# Patient Record
Sex: FEMALE | Race: OTHER | HISPANIC OR LATINO | URBAN - METROPOLITAN AREA
[De-identification: names, ages, dates, MRNs, and addresses within clinical notes are randomized per-mention and may not be internally consistent; named-entity substitution may affect disease eponyms.]

---

## 2017-02-10 ENCOUNTER — EMERGENCY (EMERGENCY)
Facility: HOSPITAL | Age: 52
LOS: 1 days | Discharge: HOME | End: 2017-02-10
Attending: EMERGENCY MEDICINE | Admitting: EMERGENCY MEDICINE
Payer: COMMERCIAL

## 2017-02-10 VITALS
HEART RATE: 81 BPM | TEMPERATURE: 98 F | RESPIRATION RATE: 20 BRPM | SYSTOLIC BLOOD PRESSURE: 129 MMHG | OXYGEN SATURATION: 98 % | HEIGHT: 63 IN | WEIGHT: 137.35 LBS | DIASTOLIC BLOOD PRESSURE: 84 MMHG

## 2017-02-10 VITALS
DIASTOLIC BLOOD PRESSURE: 78 MMHG | RESPIRATION RATE: 18 BRPM | TEMPERATURE: 98 F | HEART RATE: 80 BPM | OXYGEN SATURATION: 98 % | SYSTOLIC BLOOD PRESSURE: 119 MMHG

## 2017-02-10 DIAGNOSIS — Z91.013 ALLERGY TO SEAFOOD: ICD-10-CM

## 2017-02-10 DIAGNOSIS — R51 HEADACHE: ICD-10-CM

## 2017-02-10 DIAGNOSIS — Z88.0 ALLERGY STATUS TO PENICILLIN: ICD-10-CM

## 2017-02-10 PROCEDURE — 80053 COMPREHEN METABOLIC PANEL: CPT

## 2017-02-10 PROCEDURE — A9585: CPT

## 2017-02-10 PROCEDURE — 99285 EMERGENCY DEPT VISIT HI MDM: CPT | Mod: 25

## 2017-02-10 PROCEDURE — 70553 MRI BRAIN STEM W/O & W/DYE: CPT

## 2017-02-10 PROCEDURE — 85025 COMPLETE CBC W/AUTO DIFF WBC: CPT

## 2017-02-10 PROCEDURE — 93010 ELECTROCARDIOGRAM REPORT: CPT

## 2017-02-10 PROCEDURE — 70553 MRI BRAIN STEM W/O & W/DYE: CPT | Mod: 26

## 2017-02-10 PROCEDURE — 93005 ELECTROCARDIOGRAM TRACING: CPT

## 2017-02-10 PROCEDURE — 99284 EMERGENCY DEPT VISIT MOD MDM: CPT | Mod: 25

## 2017-02-10 PROCEDURE — 96374 THER/PROPH/DIAG INJ IV PUSH: CPT

## 2017-02-10 RX ORDER — METOCLOPRAMIDE HCL 10 MG
10 TABLET ORAL ONCE
Qty: 0 | Refills: 0 | Status: COMPLETED | OUTPATIENT
Start: 2017-02-10 | End: 2017-02-10

## 2017-02-10 RX ORDER — TRAMADOL HYDROCHLORIDE 50 MG/1
1 TABLET ORAL
Qty: 20 | Refills: 0
Start: 2017-02-10

## 2017-02-10 RX ADMIN — Medication 104 MILLIGRAM(S): at 19:14

## 2017-02-10 NOTE — ED ADULT TRIAGE NOTE - CHIEF COMPLAINT QUOTE
Patient c/o headache , dizziness  and blurry vision for 3 weeks . Denies any nausea nor vomiting . History of lupus  and head adenoma .

## 2017-02-10 NOTE — ED ADULT NURSE NOTE - CHPI ED SYMPTOMS NEG
no vomiting/no numbness/no confusion/no fever/no weakness/no loss of consciousness/no change in level of consciousness

## 2017-02-10 NOTE — ED ADULT NURSE NOTE - OBJECTIVE STATEMENT
pt is a 50 y/o female c/o constant headache for the last 3 weeks along with blurred vision, nausea, and dizziness. pt states vision has become increasingly worse and more blurry even with glasses. PMH adenoma & lupus. pt taking 5mg prednisone daily. denies any fever/chills, v/d, neck tenderness, back pain, SOB, numbness/tingling, LOC, falls, injury. EKG done, VS stable. no acute distress, pt ambulatory, speaking in full sentences, resting comfortably in stretcher awaiting MRI, made aware of plan of care, will continue to monitor.

## 2017-02-10 NOTE — ED PROVIDER NOTE - MEDICAL DECISION MAKING DETAILS
W/U initiated including labs and MRI.  Reglan given for HA.  MRI demonstrates no compression of optic chiasm.  Pt improves with treatment here and is stable for discharge with instructions for close f/u to continue evaluation.  Pt has f/u with her neuro-ophthalmologist on the 24th and instructed to see her Rheumatologist prior to that appt. Discussed broad ddx and pt understands she must f/u to further investigate her sxs.     I have discussed the discharge plan with the patient. The patient agrees with the plan, as discussed.  The patient understands Emergency Department diagnosis is a preliminary diagnosis often based on limited information and that the patient must adhere to the follow-up plan as discussed.  The patient understands that if the symptoms worsen she may return to the Emergency Department at any time for further evaluation and treatment.

## 2017-02-10 NOTE — ED PROVIDER NOTE - OBJECTIVE STATEMENT
Pt is a 50yo F with a h/o SLE and adenoma of the pituitary gland (dx in 2013) and presents with 3 weeks of HA.  Pt also notes that her vision has been worsening - described as blurry - since December of 2016.  Pt has followed with her rheumatologist and believed the blurry vision to be 2/2 Plaquenil so was stopped and has since been on a prednisone taper - currently on 5mg.  She has seen her ophthalmologist Pt is a 52yo F with a h/o SLE and adenoma of the pituitary gland (dx in 2013) and presents with 3 weeks of HA.  Pt also notes that her vision has been worsening - described as blurry - since December of 2016.  Pt has followed with her rheumatologist and believed the blurry vision to be 2/2 Plaquenil so was stopped and has since been on a prednisone taper - currently on 5mg.  She has seen her ophthalmologist and reported they couldn't find any acute issues so recommended she follows with her neurophthalmologist, and she has an appt on 2/24.  HA started about 3 weeks ago and is constant but waxes and wanes in severity.  Currently moderate but tolerable.  Encompasses entire head and radiates down back of neck.  Dull ache in nature.  No associated fever, chills, neck stiffness, numbness, weakness, N/V, or other complaints.

## 2017-11-14 ENCOUNTER — EMERGENCY (EMERGENCY)
Facility: HOSPITAL | Age: 52
LOS: 1 days | Discharge: HOME | End: 2017-11-14
Attending: EMERGENCY MEDICINE | Admitting: EMERGENCY MEDICINE
Payer: COMMERCIAL

## 2017-11-14 VITALS
HEART RATE: 63 BPM | DIASTOLIC BLOOD PRESSURE: 76 MMHG | SYSTOLIC BLOOD PRESSURE: 113 MMHG | OXYGEN SATURATION: 100 % | RESPIRATION RATE: 16 BRPM | TEMPERATURE: 99 F

## 2017-11-14 VITALS
OXYGEN SATURATION: 100 % | RESPIRATION RATE: 16 BRPM | HEART RATE: 78 BPM | DIASTOLIC BLOOD PRESSURE: 78 MMHG | WEIGHT: 130.07 LBS | SYSTOLIC BLOOD PRESSURE: 144 MMHG | TEMPERATURE: 98 F

## 2017-11-14 DIAGNOSIS — R10.11 RIGHT UPPER QUADRANT PAIN: ICD-10-CM

## 2017-11-14 DIAGNOSIS — Z88.0 ALLERGY STATUS TO PENICILLIN: ICD-10-CM

## 2017-11-14 DIAGNOSIS — K21.9 GASTRO-ESOPHAGEAL REFLUX DISEASE WITHOUT ESOPHAGITIS: ICD-10-CM

## 2017-11-14 DIAGNOSIS — R21 RASH AND OTHER NONSPECIFIC SKIN ERUPTION: ICD-10-CM

## 2017-11-14 DIAGNOSIS — Z79.899 OTHER LONG TERM (CURRENT) DRUG THERAPY: ICD-10-CM

## 2017-11-14 DIAGNOSIS — Z91.013 ALLERGY TO SEAFOOD: ICD-10-CM

## 2017-11-14 LAB
ALBUMIN SERPL ELPH-MCNC: 4.3 G/DL — SIGNIFICANT CHANGE UP (ref 3.3–5)
ALP SERPL-CCNC: 68 U/L — SIGNIFICANT CHANGE UP (ref 40–120)
ALT FLD-CCNC: 11 U/L — SIGNIFICANT CHANGE UP (ref 10–45)
ANION GAP SERPL CALC-SCNC: 10 MMOL/L — SIGNIFICANT CHANGE UP (ref 5–17)
AST SERPL-CCNC: 16 U/L — SIGNIFICANT CHANGE UP (ref 10–40)
BASOPHILS NFR BLD AUTO: 0.3 % — SIGNIFICANT CHANGE UP (ref 0–2)
BILIRUB SERPL-MCNC: 0.5 MG/DL — SIGNIFICANT CHANGE UP (ref 0.2–1.2)
BUN SERPL-MCNC: 12 MG/DL — SIGNIFICANT CHANGE UP (ref 7–23)
CALCIUM SERPL-MCNC: 9.6 MG/DL — SIGNIFICANT CHANGE UP (ref 8.4–10.5)
CHLORIDE SERPL-SCNC: 101 MMOL/L — SIGNIFICANT CHANGE UP (ref 96–108)
CO2 SERPL-SCNC: 27 MMOL/L — SIGNIFICANT CHANGE UP (ref 22–31)
CREAT SERPL-MCNC: 0.6 MG/DL — SIGNIFICANT CHANGE UP (ref 0.5–1.3)
EOSINOPHIL NFR BLD AUTO: 0.9 % — SIGNIFICANT CHANGE UP (ref 0–6)
GLUCOSE SERPL-MCNC: 94 MG/DL — SIGNIFICANT CHANGE UP (ref 70–99)
HCT VFR BLD CALC: 32.4 % — LOW (ref 34.5–45)
HGB BLD-MCNC: 10.5 G/DL — LOW (ref 11.5–15.5)
LIDOCAIN IGE QN: 24 U/L — SIGNIFICANT CHANGE UP (ref 7–60)
LYMPHOCYTES # BLD AUTO: 29 % — SIGNIFICANT CHANGE UP (ref 13–44)
MCHC RBC-ENTMCNC: 29.4 PG — SIGNIFICANT CHANGE UP (ref 27–34)
MCHC RBC-ENTMCNC: 32.4 G/DL — SIGNIFICANT CHANGE UP (ref 32–36)
MCV RBC AUTO: 90.8 FL — SIGNIFICANT CHANGE UP (ref 80–100)
MONOCYTES NFR BLD AUTO: 11.8 % — SIGNIFICANT CHANGE UP (ref 2–14)
NEUTROPHILS NFR BLD AUTO: 58 % — SIGNIFICANT CHANGE UP (ref 43–77)
PLATELET # BLD AUTO: 251 K/UL — SIGNIFICANT CHANGE UP (ref 150–400)
POTASSIUM SERPL-MCNC: 4 MMOL/L — SIGNIFICANT CHANGE UP (ref 3.5–5.3)
POTASSIUM SERPL-SCNC: 4 MMOL/L — SIGNIFICANT CHANGE UP (ref 3.5–5.3)
PROT SERPL-MCNC: 7.7 G/DL — SIGNIFICANT CHANGE UP (ref 6–8.3)
RBC # BLD: 3.57 M/UL — LOW (ref 3.8–5.2)
RBC # FLD: 13.1 % — SIGNIFICANT CHANGE UP (ref 10.3–16.9)
SODIUM SERPL-SCNC: 138 MMOL/L — SIGNIFICANT CHANGE UP (ref 135–145)
TROPONIN T SERPL-MCNC: <0.01 NG/ML — SIGNIFICANT CHANGE UP (ref 0–0.01)
WBC # BLD: 6.8 K/UL — SIGNIFICANT CHANGE UP (ref 3.8–10.5)
WBC # FLD AUTO: 6.8 K/UL — SIGNIFICANT CHANGE UP (ref 3.8–10.5)

## 2017-11-14 PROCEDURE — 99284 EMERGENCY DEPT VISIT MOD MDM: CPT | Mod: 25

## 2017-11-14 PROCEDURE — 93010 ELECTROCARDIOGRAM REPORT: CPT

## 2017-11-14 PROCEDURE — 80053 COMPREHEN METABOLIC PANEL: CPT

## 2017-11-14 PROCEDURE — 76705 ECHO EXAM OF ABDOMEN: CPT

## 2017-11-14 PROCEDURE — 84484 ASSAY OF TROPONIN QUANT: CPT

## 2017-11-14 PROCEDURE — 96374 THER/PROPH/DIAG INJ IV PUSH: CPT

## 2017-11-14 PROCEDURE — 96375 TX/PRO/DX INJ NEW DRUG ADDON: CPT

## 2017-11-14 PROCEDURE — 93005 ELECTROCARDIOGRAM TRACING: CPT

## 2017-11-14 PROCEDURE — 85025 COMPLETE CBC W/AUTO DIFF WBC: CPT

## 2017-11-14 PROCEDURE — 83690 ASSAY OF LIPASE: CPT

## 2017-11-14 PROCEDURE — 99285 EMERGENCY DEPT VISIT HI MDM: CPT | Mod: 25

## 2017-11-14 PROCEDURE — 76705 ECHO EXAM OF ABDOMEN: CPT | Mod: 26

## 2017-11-14 RX ORDER — SODIUM CHLORIDE 9 MG/ML
1000 INJECTION INTRAMUSCULAR; INTRAVENOUS; SUBCUTANEOUS ONCE
Qty: 0 | Refills: 0 | Status: COMPLETED | OUTPATIENT
Start: 2017-11-14 | End: 2017-11-14

## 2017-11-14 RX ORDER — DIPHENHYDRAMINE HCL 50 MG
25 CAPSULE ORAL ONCE
Qty: 0 | Refills: 0 | Status: COMPLETED | OUTPATIENT
Start: 2017-11-14 | End: 2017-11-14

## 2017-11-14 RX ORDER — LIDOCAINE 4 G/100G
10 CREAM TOPICAL ONCE
Qty: 0 | Refills: 0 | Status: COMPLETED | OUTPATIENT
Start: 2017-11-14 | End: 2017-11-14

## 2017-11-14 RX ORDER — ONDANSETRON 8 MG/1
4 TABLET, FILM COATED ORAL ONCE
Qty: 0 | Refills: 0 | Status: COMPLETED | OUTPATIENT
Start: 2017-11-14 | End: 2017-11-14

## 2017-11-14 RX ORDER — SODIUM CHLORIDE 9 MG/ML
3 INJECTION INTRAMUSCULAR; INTRAVENOUS; SUBCUTANEOUS ONCE
Qty: 0 | Refills: 0 | Status: COMPLETED | OUTPATIENT
Start: 2017-11-14 | End: 2017-11-14

## 2017-11-14 RX ORDER — SODIUM CHLORIDE 9 MG/ML
1000 INJECTION INTRAMUSCULAR; INTRAVENOUS; SUBCUTANEOUS
Qty: 0 | Refills: 0 | Status: DISCONTINUED | OUTPATIENT
Start: 2017-11-14 | End: 2017-11-18

## 2017-11-14 RX ORDER — FAMOTIDINE 10 MG/ML
20 INJECTION INTRAVENOUS ONCE
Qty: 0 | Refills: 0 | Status: COMPLETED | OUTPATIENT
Start: 2017-11-14 | End: 2017-11-14

## 2017-11-14 RX ADMIN — SODIUM CHLORIDE 1000 MILLILITER(S): 9 INJECTION INTRAMUSCULAR; INTRAVENOUS; SUBCUTANEOUS at 10:00

## 2017-11-14 RX ADMIN — FAMOTIDINE 20 MILLIGRAM(S): 10 INJECTION INTRAVENOUS at 10:01

## 2017-11-14 RX ADMIN — Medication 101 MILLIGRAM(S): at 12:52

## 2017-11-14 RX ADMIN — ONDANSETRON 4 MILLIGRAM(S): 8 TABLET, FILM COATED ORAL at 10:01

## 2017-11-14 RX ADMIN — Medication 30 MILLILITER(S): at 10:01

## 2017-11-14 RX ADMIN — SODIUM CHLORIDE 3 MILLILITER(S): 9 INJECTION INTRAMUSCULAR; INTRAVENOUS; SUBCUTANEOUS at 09:55

## 2017-11-14 RX ADMIN — LIDOCAINE 10 MILLILITER(S): 4 CREAM TOPICAL at 10:01

## 2017-11-14 NOTE — ED PROVIDER NOTE - CARE PLAN
Principal Discharge DX:	Abdominal pain  Secondary Diagnosis:	GERD (gastroesophageal reflux disease)  Secondary Diagnosis:	Rash

## 2017-11-14 NOTE — ED ADULT TRIAGE NOTE - CHIEF COMPLAINT QUOTE
epigastric abdominal burning x 5 days.  Vomited at 5am today.  Last BM this morning.  Denies fever, chills, recent travels.

## 2017-11-14 NOTE — ED PROVIDER NOTE - MEDICAL DECISION MAKING DETAILS
53 yo female with hx of lupus (on prednisone daily), fibromyalgia, "problems with liver"- ?fatty liver/ ?elevated liver enzymes,  presents with nausea and nbnb emesis every time she eats and burning epigastric abdominal pain X 1 week. Was prescribed Zantac last week for these sxs by her PCP with no relief. Went to see her PCP yesterday and was referred to a GI doctor but does not have an apt till much later. labs/ studies noted. U/s with no acute ds. Pt feeling much better post GI cocktail. TO continue Zantac and to f/up outpt. SXs likely sec to GERD. 51 yo female with hx of lupus (on prednisone daily), fibromyalgia, "problems with liver"- ?fatty liver/ ?elevated liver enzymes,  presents with nausea and nbnb emesis every time she eats and burning epigastric abdominal pain X 1 week. Was prescribed Zantac last week for these sxs by her PCP with no relief. Went to see her PCP yesterday and was referred to a GI doctor but does not have an apt till much later. labs/ studies noted. U/s with no acute ds. Pt feeling much better post GI cocktail. To continue Zantac and to f/up outpt. SXs likely sec to GERD. Pt also to f/up outpt with for rash.

## 2017-11-14 NOTE — ED PROVIDER NOTE - OBJECTIVE STATEMENT
53 yo female with hx of lupus(on prednisone daily), fibromyalgia, "problems with liver"- ?fatty liver/ ?elevated liver enzymes,  presents with nausea and nbnb emesis every time she eats and burning epigastric abdominal pain X 1 week. Was prescribed Zantac last week for these sxs by her PCP with no relief. Went to see her PCP yesterday and was referred to a GI doctotr but states "he is booked till next year". Sxs have continued and pt presents to ED for evaluation. Did not take Zantac today. No prior abdominal surgeries. Pt also c/o itchy rash to torso and arms- had similar last yeara and was seen by dermatologist and had a biopsy that showed a "fungal infection". No resp sxs/ stridor/ throat tightness/ facial swelling. NO CP/SOB/ fevers/ chills/ cough. No change in BMs- last BM this morning- non-bloody and formed. 51 yo female with hx of lupus(on prednisone daily), fibromyalgia, "problems with liver"- ?fatty liver/ ?elevated liver enzymes,  presents with nausea and nbnb emesis every time she eats and burning epigastric abdominal pain X 1 week. Was prescribed Zantac last week for these sxs by her PCP with no relief. Went to see her PCP yesterday and was referred to a GI doctor but states "he is booked till next year". Sxs have continued and pt presents to ED for evaluation. Did not take Zantac today. No prior abdominal surgeries. Pt also c/o itchy rash to torso and arms- had similar last yeara and was seen by dermatologist and had a biopsy that showed a "fungal infection". No resp sxs/ stridor/ throat tightness/ facial swelling. NO CP/SOB/ fevers/ chills/ cough. No change in BMs- last BM this morning- non-bloody and formed. 51 yo female with hx of lupus (on prednisone daily), fibromyalgia, "problems with liver"- ?fatty liver/ ?elevated liver enzymes,  presents with nausea and nbnb emesis every time she eats and burning epigastric abdominal pain X 1 week. Was prescribed Zantac last week for these sxs by her PCP with no relief. Went to see her PCP yesterday and was referred to a GI doctor but states "he is booked till next year". Sxs have continued and pt presents to ED for evaluation. Did not take Zantac today. No prior abdominal surgeries. Pt also c/o itchy rash to torso and arms- had similar last yeara and was seen by dermatologist and had a biopsy that showed a "fungal infection". No resp sxs/ stridor/ throat tightness/ facial swelling. NO CP/SOB/ fevers/ chills/ cough. No change in BMs- last BM this morning- non-bloody and formed. 51 yo female with hx of lupus (on prednisone daily), fibromyalgia, "problems with liver"- ?fatty liver/ ?elevated liver enzymes,  presents with nausea and nbnb emesis every time she eats and burning epigastric abdominal pain X 1 week. Was prescribed Zantac last week for these sxs by her PCP with no relief. Went to see her PCP yesterday and was referred to a GI doctor but states "he is booked till next year". Sxs have continued and pt presents to ED for evaluation. Did not take Zantac today. No prior abdominal surgeries. Pt also c/o itchy rash to torso and arms- had similar last year and was seen by dermatologist and had a biopsy that showed a "fungal infection". No resp sxs/ stridor/ throat tightness/ facial swelling. NO CP/SOB/ fevers/ chills/ cough. No change in BMs- last BM this morning- non-bloody and formed.

## 2019-04-11 PROBLEM — M32.9 SYSTEMIC LUPUS ERYTHEMATOSUS, UNSPECIFIED: Chronic | Status: ACTIVE | Noted: 2017-02-10

## 2019-06-19 ENCOUNTER — APPOINTMENT (OUTPATIENT)
Dept: NEUROLOGY | Facility: CLINIC | Age: 54
End: 2019-06-19
Payer: MEDICAID

## 2019-06-19 PROCEDURE — 99204 OFFICE O/P NEW MOD 45 MIN: CPT

## 2019-06-19 NOTE — HISTORY OF PRESENT ILLNESS
[FreeTextEntry1] : 53 yo W referred by Dr. Gibson, reportedly for MS. \par Her main complaint is pain throughotu her body, although she also endorses weakness and numbness\par She reports history of pituitary tumor, fibromyalgia, carpal tunnel syndrome, migraine headaches. \par She is on cymbalta, prednisone, sertraline\par She has seen a neuro-ophthalmologist for visual problems, possibly related to plaquenil, but also states she cannot find why her vision is getting worse. \par ESR 6, , RF / CCP / SSA/B negative, B12/folate normal\par MRI brain 2/2017 unremarkable\par \par \par 10 pt review of systems performed and reviewed with patient\par Past medical history, surgical history, social history, and family history reviewed with patient\par See scanned document for details

## 2019-06-19 NOTE — CONSULT LETTER
[Consult Letter:] : I had the pleasure of evaluating your patient, [unfilled]. [Dear  ___] : Dear  [unfilled], [Please see my note below.] : Please see my note below. [Consult Closing:] : Thank you very much for allowing me to participate in the care of this patient.  If you have any questions, please do not hesitate to contact me. [Sincerely,] : Sincerely, [FreeTextEntry3] : Marco Antonio Mead M.D.\par Neurology, Electromyography and Neuromuscular Medicine\par [facility]\par \par  of Neurology\par [facility] / [facility] [employer] [facility] [employer]

## 2019-06-19 NOTE — ASSESSMENT
[FreeTextEntry1] : Exam, history, and MRI brain all are not consistent with MS\par Complaints are mostly pain, likely related to fibromyalgia or possibly other rheumatologic process (although recent ESR and RF/CCP negative)\par No further neurologic workup or follow up is recommended

## 2019-06-19 NOTE — PHYSICAL EXAM
[FreeTextEntry1] : Gen: appears well, well-nourished, no acute distress\par \par MS: awake, alert, speech fluent, comprehension intact, follows commands\par \par CN: PERRL, EOMI, visual fields full, facial strength and sensation intact and symmetric, palate elevation symmetric, tongue midline, no tongue atrophy or fasciculations\par \par Motor: normal bulk and tone, 5/5 strength throughout, no abnormal movements\par \par Sensory: light touch intact and symmetric throughout\par \par Reflexes: 2+ symmetric throughout, no Baker’s sign, plantar responses flexor bilaterally\par \par Coordination: no dysmetria on finger to nose, Romberg negative\par \par Gait: normal, can tandem without difficulty\par \par Skin: no rash on extremities\par \par Ext: no edema\par \par CV: regular rate\par \par Ophtho: fundi normal bilaterally

## 2019-11-12 NOTE — ED ADULT NURSE NOTE - MOUTH
Pt c/o left flank and abd pain since last night.  +Vomiting.  Denies diarrhea.  Saw MD 1 week ago and for same and was given tylenol #3.  Last dose 0600h.  PMH kidney stones.  Denies hematuria or fever.  Appears comfortable pink/moist mucosa

## 2019-12-16 ENCOUNTER — APPOINTMENT (OUTPATIENT)
Dept: NEUROLOGY | Facility: CLINIC | Age: 54
End: 2019-12-16
Payer: MEDICAID

## 2019-12-16 VITALS
OXYGEN SATURATION: 98 % | WEIGHT: 133 LBS | BODY MASS INDEX: 23.57 KG/M2 | SYSTOLIC BLOOD PRESSURE: 119 MMHG | HEIGHT: 63 IN | HEART RATE: 73 BPM | DIASTOLIC BLOOD PRESSURE: 82 MMHG

## 2019-12-16 PROCEDURE — 99215 OFFICE O/P EST HI 40 MIN: CPT

## 2019-12-16 NOTE — DISCUSSION/SUMMARY
[FreeTextEntry1] : It    appears      that   her   predominant  type    of   HAs  are  tension  type  HAs.\par Would  recommend   Petadolex  and  Riboflavin.\par She    states     that  Imitrex   caused   "measles  like   rash".  She     states     that  she    has   an  allergic  reaction    to  NSAIDs,  but  is able  to  take    Voltaren. \par Verapamil   was     suggested   by  Pt's     Neuro Ophthalmologist,  but     I   don't   believe    it   will    help   since    her    SBP   appears  to  be   low.  \par \par Would   obtain  the    recent   MRI  report\par would  recommend   Petadolex  and    Riboflavin   for    HA  prevention\par May  consider   Botox (Pt  declined   at  this  time)\par Would  recommend    Neurontin   100  mg   TID.

## 2019-12-16 NOTE — PHYSICAL EXAM
[General Appearance - Alert] : alert [General Appearance - In No Acute Distress] : in no acute distress [Oriented To Time, Place, And Person] : oriented to person, place, and time [Impaired Insight] : insight and judgment were intact [Affect] : the affect was normal [Person] : oriented to person [Place] : oriented to place [Time] : oriented to time [Concentration Intact] : normal concentrating ability [Visual Intact] : visual attention was ~T not ~L decreased [Naming Objects] : no difficulty naming common objects [Repeating Phrases] : no difficulty repeating a phrase [Writing A Sentence] : no difficulty writing a sentence [Fluency] : fluency intact [Comprehension] : comprehension intact [Reading] : reading intact [Past History] : adequate knowledge of personal past history [Cranial Nerves Optic (II)] : visual acuity intact bilaterally,  visual fields full to confrontation, pupils equal round and reactive to light [Cranial Nerves Oculomotor (III)] : extraocular motion intact [Cranial Nerves Trigeminal (V)] : facial sensation intact symmetrically [Cranial Nerves Facial (VII)] : face symmetrical [Cranial Nerves Vestibulocochlear (VIII)] : hearing was intact bilaterally [Cranial Nerves Glossopharyngeal (IX)] : tongue and palate midline [Cranial Nerves Accessory (XI - Cranial And Spinal)] : head turning and shoulder shrug symmetric [Cranial Nerves Hypoglossal (XII)] : there was no tongue deviation with protrusion [Motor Tone] : muscle tone was normal in all four extremities [Motor Strength] : muscle strength was normal in all four extremities [No Muscle Atrophy] : normal bulk in all four extremities [Sensation Tactile Decrease] : light touch was intact [Abnormal Walk] : normal gait [Balance] : balance was intact [2+] : Ankle jerk left 2+ [Past-pointing] : there was no past-pointing [Tremor] : no tremor present [Plantar Reflex Right Only] : normal on the right [Plantar Reflex Left Only] : normal on the left

## 2019-12-16 NOTE — REVIEW OF SYSTEMS
[As Noted in HPI] : as noted in HPI [Migraine Headache] : migraine headaches [Tension Headache] : tension-type headaches

## 2019-12-16 NOTE — HISTORY OF PRESENT ILLNESS
[FreeTextEntry1] : Pt   was  referred   for   evaluation of  headaches.   She     was  previously  seen  by   Dr. Mead.\par \par From  Dr. Mead's   notes:\par 53 yo W referred by Dr. Gibson, reportedly for MS. \par Her main complaint is pain throughotu her body, although she also endorses weakness and numbness\par She reports history of pituitary tumor, fibromyalgia, carpal tunnel syndrome, migraine headaches. \par She is on cymbalta, prednisone, sertraline\par She has seen a neuro-ophthalmologist for visual problems, possibly related to plaquenil, but also states she cannot find why her vision is getting worse. \par ESR 6, , RF / CCP / SSA/B negative, B12/folate normal\par MRI brain 2/2017 unremarkable\par \par Today    Pt   presented    with   the    letter   from   Dr. Boyer,  Neuro ophthalmologist.  DR. Boyer   inquired   if  the    Pt   can  be   started  on   Verapamil   for  HAs    prevention. \par \par Pt  states     that   headaches     started   more   than  7   years   ago.  HAs   are  about   4  times  a   week.   HAs    described  as    sharp,  frontal, pressure like (like  "little   needles"),  at times  associated  with  sensitivity  to light   and   noise. Pt   failed  Topamax   and  Magnesium.   Pt   states     that  she  needed      to reduce    the  hours    of   work   due   to  headaches.   She    states   that  she    can  not   tolerate    NSAIDs. She    states     that     she is  not   taking  any   meds      for     acute   headaches,  but    tries   to  lie   down  and    use   cold   compresses.  She    reports   Lactorrhea;  the    results   of    recent    MRI   are     not  available.  Pt     reports   microadenoma.

## 2020-08-06 ENCOUNTER — APPOINTMENT (OUTPATIENT)
Dept: PULMONOLOGY | Facility: CLINIC | Age: 55
End: 2020-08-06
Payer: MEDICAID

## 2020-08-06 VITALS
DIASTOLIC BLOOD PRESSURE: 70 MMHG | WEIGHT: 131 LBS | OXYGEN SATURATION: 98 % | BODY MASS INDEX: 23.21 KG/M2 | HEIGHT: 63 IN | TEMPERATURE: 97 F | HEART RATE: 68 BPM | SYSTOLIC BLOOD PRESSURE: 110 MMHG

## 2020-08-06 PROCEDURE — 99244 OFF/OP CNSLTJ NEW/EST MOD 40: CPT

## 2020-08-06 RX ORDER — PREDNISONE 10 MG/1
10 TABLET ORAL TWICE DAILY
Qty: 30 | Refills: 1 | Status: DISCONTINUED | COMMUNITY
End: 2020-08-06

## 2020-08-06 RX ORDER — GABAPENTIN 100 MG/1
100 CAPSULE ORAL 3 TIMES DAILY
Qty: 90 | Refills: 5 | Status: DISCONTINUED | COMMUNITY
Start: 2019-12-16 | End: 2020-08-06

## 2020-09-08 ENCOUNTER — LABORATORY RESULT (OUTPATIENT)
Age: 55
End: 2020-09-08

## 2020-09-27 ENCOUNTER — TRANSCRIPTION ENCOUNTER (OUTPATIENT)
Age: 55
End: 2020-09-27

## 2020-09-29 ENCOUNTER — OUTPATIENT (OUTPATIENT)
Dept: OUTPATIENT SERVICES | Facility: HOSPITAL | Age: 55
LOS: 1 days | End: 2020-09-29
Payer: COMMERCIAL

## 2020-09-29 DIAGNOSIS — R06.09 OTHER FORMS OF DYSPNEA: ICD-10-CM

## 2020-09-29 PROCEDURE — 93016 CV STRESS TEST SUPVJ ONLY: CPT

## 2020-09-29 PROCEDURE — 93017 CV STRESS TEST TRACING ONLY: CPT

## 2020-09-29 PROCEDURE — 93018 CV STRESS TEST I&R ONLY: CPT

## 2020-09-29 PROCEDURE — A9505: CPT

## 2020-09-29 PROCEDURE — 78452 HT MUSCLE IMAGE SPECT MULT: CPT

## 2020-09-29 PROCEDURE — A9500: CPT

## 2020-09-29 PROCEDURE — 78452 HT MUSCLE IMAGE SPECT MULT: CPT | Mod: 26

## 2020-09-30 ENCOUNTER — APPOINTMENT (OUTPATIENT)
Dept: NEUROLOGY | Facility: CLINIC | Age: 55
End: 2020-09-30
Payer: MEDICAID

## 2020-09-30 VITALS
WEIGHT: 135 LBS | RESPIRATION RATE: 16 BRPM | DIASTOLIC BLOOD PRESSURE: 70 MMHG | HEIGHT: 63 IN | BODY MASS INDEX: 23.92 KG/M2 | SYSTOLIC BLOOD PRESSURE: 109 MMHG | TEMPERATURE: 97.6 F | HEART RATE: 78 BPM | OXYGEN SATURATION: 99 %

## 2020-09-30 PROCEDURE — 99215 OFFICE O/P EST HI 40 MIN: CPT

## 2020-09-30 RX ORDER — CYCLOBENZAPRINE HCL 5 MG
TABLET ORAL
Refills: 0 | Status: DISCONTINUED | COMMUNITY
End: 2020-09-30

## 2020-09-30 NOTE — HISTORY OF PRESENT ILLNESS
[FreeTextEntry1] : Pt was referred for evaluation of headaches. She was previously seen by Dr. Mead and Dr Amaya\par \par From Dr. Mead's notes:\par 54 yo W HA. hx of \par Her main complaint is pain throughout her body, although she also endorses weakness and numbness\par She reports history of pituitary tumor, fibromyalgia, carpal tunnel syndrome, migraine headaches. \par She is on cymbalta, prednisone, sertraline\par She has seen a neuro-ophthalmologist for visual problems, possibly related to plaquenil, but also states she cannot find why her vision is getting worse. \par ESR 6, , RF / CCP / SSA/B negative, B12/folate normal\par MRI brain 2/2017 unremarkable\par \par Today Pt presented with the letter from Dr. Boyer, Neuro ophthalmologist. DR. Boyer inquired if the Pt can be started on Verapamil for HAs prevention. \par \par Pt states that headaches started more than 7 years ago. HAs are about 4 times a week. HAs described as sharp, frontal, pressure like (like "little needles"), at times associated with sensitivity to light and noise. Pt failed Topamax and Magnesium. Pt states that she needed to reduce the hours of work due to headaches. She states that she can not tolerate NSAIDs. She states that she is not taking any meds for acute headaches, but tries to lie down and use cold compresses. She reports Lactorrhea; the results of recent MRI are not available. 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 \par  \par Meds for Migraine\par TOP\par Celebrex\par Neurontin\par Imitrex\par \par Never took:\par \par Amitriptyline\par Nortriptyline\par Propranolol\par Acido [place]\par \par

## 2020-09-30 NOTE — REVIEW OF SYSTEMS
[Feeling Tired] : feeling tired [Sleep Disturbances] : sleep disturbances [Depression] : depression [Arthralgias] : arthralgias [Joint Pain] : joint pain [Joint Swelling] : joint swelling [Joint Stiffness] : joint stiffness [Negative] : Integumentary

## 2020-09-30 NOTE — PHYSICAL EXAM
[Person] : oriented to person [Place] : oriented to place [Time] : oriented to time [Concentration Intact] : normal concentrating ability [Visual Intact] : visual attention was ~T not ~L decreased [Naming Objects] : no difficulty naming common objects [Repeating Phrases] : no difficulty repeating a phrase [Writing A Sentence] : no difficulty writing a sentence [Fluency] : fluency intact [Comprehension] : comprehension intact [Reading] : reading intact [Past History] : adequate knowledge of personal past history [Cranial Nerves Optic (II)] : visual acuity intact bilaterally,  visual fields full to confrontation, pupils equal round and reactive to light [Cranial Nerves Oculomotor (III)] : extraocular motion intact [Cranial Nerves Trigeminal (V)] : facial sensation intact symmetrically [Cranial Nerves Facial (VII)] : face symmetrical [Cranial Nerves Vestibulocochlear (VIII)] : hearing was intact bilaterally [Motor Tone] : muscle tone was normal in all four extremities [Motor Strength] : muscle strength was normal in all four extremities [Motor Handedness Right-Handed] : the patient is right hand dominant [Sensation Tactile Decrease] : light touch was intact [Abnormal Walk] : normal gait [Balance] : balance was intact [2+] : Ankle jerk left 2+ [Past-pointing] : there was no past-pointing [Tremor] : no tremor present [Plantar Reflex Right Only] : normal on the right [Plantar Reflex Left Only] : normal on the left

## 2020-10-06 ENCOUNTER — LABORATORY RESULT (OUTPATIENT)
Age: 55
End: 2020-10-06

## 2020-10-09 ENCOUNTER — OUTPATIENT (OUTPATIENT)
Dept: OUTPATIENT SERVICES | Facility: HOSPITAL | Age: 55
LOS: 1 days | End: 2020-10-09
Payer: COMMERCIAL

## 2020-10-09 DIAGNOSIS — R06.00 DYSPNEA, UNSPECIFIED: ICD-10-CM

## 2020-10-09 PROCEDURE — 94060 EVALUATION OF WHEEZING: CPT

## 2020-10-09 PROCEDURE — 94726 PLETHYSMOGRAPHY LUNG VOLUMES: CPT | Mod: 26

## 2020-10-09 PROCEDURE — 94729 DIFFUSING CAPACITY: CPT | Mod: 26

## 2020-10-09 PROCEDURE — 94010 BREATHING CAPACITY TEST: CPT | Mod: 26

## 2020-10-09 PROCEDURE — 94726 PLETHYSMOGRAPHY LUNG VOLUMES: CPT

## 2020-10-09 PROCEDURE — 94729 DIFFUSING CAPACITY: CPT

## 2020-10-09 PROCEDURE — 94760 N-INVAS EAR/PLS OXIMETRY 1: CPT

## 2020-10-13 ENCOUNTER — OUTPATIENT (OUTPATIENT)
Dept: OUTPATIENT SERVICES | Facility: HOSPITAL | Age: 55
LOS: 1 days | End: 2020-10-13
Payer: COMMERCIAL

## 2020-10-13 ENCOUNTER — APPOINTMENT (OUTPATIENT)
Dept: NEPHROLOGY | Facility: CLINIC | Age: 55
End: 2020-10-13
Payer: MEDICAID

## 2020-10-13 VITALS
HEIGHT: 63 IN | HEART RATE: 76 BPM | SYSTOLIC BLOOD PRESSURE: 122 MMHG | WEIGHT: 135 LBS | BODY MASS INDEX: 23.92 KG/M2 | DIASTOLIC BLOOD PRESSURE: 78 MMHG

## 2020-10-13 DIAGNOSIS — K21.9 GASTRO-ESOPHAGEAL REFLUX DISEASE W/OUT ESOPHAGITIS: ICD-10-CM

## 2020-10-13 DIAGNOSIS — G56.03 CARPAL TUNNEL SYNDROM,BILATERAL UPPER LIMBS: ICD-10-CM

## 2020-10-13 LAB — ERYTHROCYTE [SEDIMENTATION RATE] IN BLOOD BY WESTERGREN METHOD: 16 MM/HR

## 2020-10-13 PROCEDURE — 76770 US EXAM ABDO BACK WALL COMP: CPT | Mod: 26

## 2020-10-13 PROCEDURE — 99204 OFFICE O/P NEW MOD 45 MIN: CPT

## 2020-10-13 PROCEDURE — 76770 US EXAM ABDO BACK WALL COMP: CPT

## 2020-10-13 NOTE — ASSESSMENT
[FreeTextEntry1] : 55-year-old woman with newly discovered, mild proteinuria with normal renal function and a history of SLE -urinalysis showed no protein, and 24-hour was 160 mg, which is barely abnormal.  So first, I would like to check urine microalbumin and another urinalysis -if indeed she has mild microalbuminuria, the question is: What is the etiology?  SLE is the first thought, but there is also the possibility that this could be related to regular use of NSAIDs, or even a mild form of COVID nephropathy.  I have ordered a renal ultrasound to check kidney size, echogenicity, and rule out stone in view of her right CVA tenderness.  That could also just be part of her fibromyalgia.  I am not currently in favor of percutaneous renal biopsy, because of her normal creatinine/GFR and very minimal degree of proteinuria.  I also expressed concern to her with regard to her regular use of NSAIDs and daily use of PPI, both of which can be associated with a higher risk of CKD.  If her test turn out reasonably well, we will revisit her in 3 to 4 months.  I did order C3 and C4 complement, sed rate, and double-stranded DNA in an effort to tell whether her SLE is active.

## 2020-10-13 NOTE — PHYSICAL EXAM
[General Appearance - Alert] : alert [General Appearance - In No Acute Distress] : in no acute distress [Sclera] : the sclera and conjunctiva were normal [PERRL With Normal Accommodation] : pupils were equal in size, round, and reactive to light [Outer Ear] : the ears and nose were normal in appearance [Neck Appearance] : the appearance of the neck was normal [Neck Cervical Mass (___cm)] : no neck mass was observed [Jugular Venous Distention Increased] : there was no jugular-venous distention [Auscultation Breath Sounds / Voice Sounds] : lungs were clear to auscultation bilaterally [Heart Rate And Rhythm] : heart rate was normal and rhythm regular [Heart Sounds] : normal S1 and S2 [Heart Sounds Gallop] : no gallops [Murmurs] : no murmurs [Heart Sounds Pericardial Friction Rub] : no pericardial rub [Full Pulse] : the pedal pulses are present [Edema] : there was no peripheral edema [Cervical Lymph Nodes Enlarged Posterior Bilaterally] : posterior cervical [Cervical Lymph Nodes Enlarged Anterior Bilaterally] : anterior cervical [Supraclavicular Lymph Nodes Enlarged Bilaterally] : supraclavicular [No Spinal Tenderness] : no spinal tenderness [FreeTextEntry1] : rt cva [Skin Color & Pigmentation] : normal skin color and pigmentation [Skin Turgor] : normal skin turgor [] : no rash [Deep Tendon Reflexes (DTR)] : deep tendon reflexes were 2+ and symmetric [No Focal Deficits] : no focal deficits [Oriented To Time, Place, And Person] : oriented to person, place, and time [Impaired Insight] : insight and judgment were intact [Affect] : the affect was normal

## 2020-10-13 NOTE — REVIEW OF SYSTEMS
[Feeling Poorly] : feeling poorly [Feeling Tired] : feeling tired [Heartburn] : heartburn [Arthralgias] : arthralgias [Joint Pain] : joint pain [Joint Stiffness] : joint stiffness [Negative] : Heme/Lymph [de-identified] : Photosensitivity

## 2020-10-13 NOTE — HISTORY OF PRESENT ILLNESS
[FreeTextEntry1] : 55-year-old woman referred by Dr. Miranda because of a recent finding of mild proteinuria, 160 mg/day -with no protein found on standard urinalysis.  She has a history of anemia, with no evidence of myeloma, normal iron indices, no light chains, increased hemoglobin F.  She has osteoporosis treated with Fosamax, GERD treated with PPI, fibromyalgia managed with Voltaren cream and diclofenac tablets twice daily, and a history of SLE treated with Plaquenil.  She has a history of Raynaud's phenomenon and photosensitivity, but no Maller rash, diffuse polyarthralgias.  She complains of right flank pain, without hematuria, dysuria.  Her creatinine is normal at 0.6 with a GFR of 100.  She was found to have positive COVID antibody in the absence of obvious covert symptoms.

## 2020-10-13 NOTE — CONSULT LETTER
[Dear  ___] : Dear  [unfilled], [Consult Letter:] : I had the pleasure of evaluating your patient, [unfilled]. [Please see my note below.] : Please see my note below. [Consult Closing:] : Thank you very much for allowing me to participate in the care of this patient.  If you have any questions, please do not hesitate to contact me. [Sincerely,] : Sincerely, [FreeTextEntry2] : Ruben [FreeTextEntry3] : Sincerely, \par \par Mike Barksdale MD, FACP

## 2020-10-14 LAB
APPEARANCE: CLEAR
BACTERIA: NEGATIVE
BILIRUBIN URINE: NEGATIVE
BLOOD URINE: NEGATIVE
C3 SERPL-MCNC: 101 MG/DL
C4 SERPL-MCNC: 26 MG/DL
COLOR: COLORLESS
CREAT SPEC-SCNC: 35 MG/DL
DSDNA AB SER-ACNC: <12 IU/ML
GLUCOSE QUALITATIVE U: NEGATIVE
HYALINE CASTS: 0 /LPF
KETONES URINE: NEGATIVE
LEUKOCYTE ESTERASE URINE: NEGATIVE
MICROALBUMIN 24H UR DL<=1MG/L-MCNC: <1.2 MG/DL
MICROALBUMIN/CREAT 24H UR-RTO: NORMAL MG/G
MICROSCOPIC-UA: NORMAL
NITRITE URINE: NEGATIVE
PH URINE: 8
PROTEIN URINE: NEGATIVE
RED BLOOD CELLS URINE: 1 /HPF
SPECIFIC GRAVITY URINE: 1.01
SQUAMOUS EPITHELIAL CELLS: 0 /HPF
UROBILINOGEN URINE: NORMAL
WHITE BLOOD CELLS URINE: 0 /HPF

## 2020-10-21 ENCOUNTER — OUTPATIENT (OUTPATIENT)
Dept: OUTPATIENT SERVICES | Facility: HOSPITAL | Age: 55
LOS: 1 days | End: 2020-10-21
Payer: COMMERCIAL

## 2020-10-21 ENCOUNTER — APPOINTMENT (OUTPATIENT)
Dept: CT IMAGING | Facility: HOSPITAL | Age: 55
End: 2020-10-21
Payer: MEDICAID

## 2020-10-21 PROCEDURE — 71250 CT THORAX DX C-: CPT | Mod: 26

## 2020-10-21 PROCEDURE — 71250 CT THORAX DX C-: CPT

## 2020-10-22 NOTE — HISTORY OF PRESENT ILLNESS
[TextBox_4] : 2020: Asked to evaluate patient by Dr Kc for dyspnea. States that in May she was in the shower and she passed out and was out for 2 hours. Came to, felt dizzy and anxious, vomited blood. Called EMS, vitals were fine so she stayed home due to Covid. She was seen at [facility] and the feeling was that she had sleep deprivation. Her   in  and her only son  in the  since 2018 and since then she struggles w depression and can't sleep. She is also unable to use her CPAP. But none of that relates to her visit today. She complains of dyspnea x months. She is dyspneic on 1.5 blocks. No history of lung disease. Never smoker. No cough or sputum. SLE treated by Dr Law with HCQ, formerly on prednisone but no longer. She saw a cardiologist last week, Dr Irizarry, and doesn't know the results of that evaluation as yet.\par

## 2020-10-22 NOTE — CONSULT LETTER
[Dear  ___] : Dear  [unfilled], [( Thank you for referring [unfilled] for consultation for _____ )] : Thank you for referring [unfilled] for consultation for [unfilled] [Please see my note below.] : Please see my note below. [Consult Closing:] : Thank you very much for allowing me to participate in the care of this patient.  If you have any questions, please do not hesitate to contact me. [Sincerely,] : Sincerely, [FreeTextEntry2] : Anton Kc MD\par [address] \[place] [address] [FreeTextEntry3] : Esme Rodriguez MD, FCCP\par

## 2020-10-22 NOTE — PHYSICAL EXAM
[No Acute Distress] : no acute distress [Normal Appearance] : normal appearance [Normal S1, S2] : normal s1, s2 [Normal Rate/Rhythm] : normal rate/rhythm [No Murmurs] : no murmurs [No Resp Distress] : no resp distress [Clear to Auscultation Bilaterally] : clear to auscultation bilaterally [Benign] : benign [No Clubbing] : no clubbing [No Edema] : no edema [Normal Color/ Pigmentation] : normal color/ pigmentation [Normal Affect] : normal affect [TextBox_140] : tearful discussing son but appropriate

## 2020-10-22 NOTE — ASSESSMENT
[FreeTextEntry1] : Data reviewed:\par \par PA/lat CXR [facility] 6/2020 personally reviewed : clear lungs\par \par Impression:\par Dyspnea on exertion w SLE\par \par Plan:\par Full PFT and further workup from there.\par Uncertain that there is any lung disease.\par --\par PFT [facility] 10/9/20: FVC 1.95L (63%), mod restriction TLC 3.01L (66%), moderately reduced DLCO 13.2 (62%)\par will get HRCT / LM on VM / then see in office\par --\par CT chest 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 She has follow up 11/4; will plan on repeating PFT in 3 mos.

## 2020-11-04 ENCOUNTER — APPOINTMENT (OUTPATIENT)
Dept: PULMONOLOGY | Facility: CLINIC | Age: 55
End: 2020-11-04
Payer: MEDICAID

## 2020-11-04 VITALS
BODY MASS INDEX: 23.92 KG/M2 | TEMPERATURE: 97.6 F | HEIGHT: 63 IN | OXYGEN SATURATION: 98 % | SYSTOLIC BLOOD PRESSURE: 108 MMHG | HEART RATE: 80 BPM | WEIGHT: 135 LBS | DIASTOLIC BLOOD PRESSURE: 70 MMHG

## 2020-11-04 PROCEDURE — 99213 OFFICE O/P EST LOW 20 MIN: CPT

## 2020-11-04 PROCEDURE — 99072 ADDL SUPL MATRL&STAF TM PHE: CPT

## 2020-11-04 NOTE — CONSULT LETTER
[Dear  ___] : Dear  [unfilled], [Courtesy Letter:] : I had the pleasure of seeing your patient, [unfilled], in my office today. [DrAfua  ___] : Dr. DESOUZA [Please see my note below.] : Please see my note below. [Consult Closing:] : Thank you very much for allowing me to participate in the care of this patient.  If you have any questions, please do not hesitate to contact me. [Sincerely,] : Sincerely, [FreeTextEntry2] : Anton Kc MD\par [address] \[place] [address]\par  [FreeTextEntry3] : Esme Rodriguez MD, FCCP\par

## 2020-11-04 NOTE — HISTORY OF PRESENT ILLNESS
[TextBox_4] : 2020: Asked to evaluate patient by Dr Kc for dyspnea. States that in May she was in the shower and she passed out and was out for 2 hours. Came to, felt dizzy and anxious, vomited blood. Called EMS, vitals were fine so she stayed home due to Covid. She was seen at [facility] and the feeling was that she had sleep deprivation. Her   in  and her only son  in the  since 2018 and since then she struggles w depression and can't sleep. She is also unable to use her CPAP. But none of that relates to her visit today. She complains of dyspnea x months. She is dyspneic on 1.5 blocks. No history of lung disease. Never smoker. No cough or sputum. SLE treated by Dr Law with HCQ, formerly on prednisone but no longer. She saw a cardiologist last week, Dr Irizarry, and doesn't know the results of that evaluation as yet.\par \par 20: In the interim underwent Covid swab for PFT and tested positive, so follow up was delayed by isolation and wait for PFT. She stayed home, felt somewhat more dyspneic, had low grade temp 100.9, anosmia for a month, no GI symptoms. She feels back to her usual self now. Saw Dr Irizarry last week, told everything was normal. Remains dyspneic, has a sensation of her breath stopping in her mouth, can't sleep, and feels numb all over her body at night when she tries to go to sleep. Not using CPAP.\par

## 2020-11-04 NOTE — ASSESSMENT
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 Repeat PFT in 3 mos and see me after.

## 2020-11-19 ENCOUNTER — APPOINTMENT (OUTPATIENT)
Dept: DERMATOLOGY | Facility: CLINIC | Age: 55
End: 2020-11-19
Payer: MEDICAID

## 2020-11-19 VITALS — DIASTOLIC BLOOD PRESSURE: 77 MMHG | TEMPERATURE: 97.2 F | SYSTOLIC BLOOD PRESSURE: 110 MMHG

## 2020-11-19 DIAGNOSIS — L30.9 DERMATITIS, UNSPECIFIED: ICD-10-CM

## 2020-11-19 DIAGNOSIS — Z91.89 OTHER SPECIFIED PERSONAL RISK FACTORS, NOT ELSEWHERE CLASSIFIED: ICD-10-CM

## 2020-11-19 DIAGNOSIS — L72.11 PILAR CYST: ICD-10-CM

## 2020-11-19 PROCEDURE — 99203 OFFICE O/P NEW LOW 30 MIN: CPT

## 2020-11-19 NOTE — PHYSICAL EXAM
[Alert] : alert [Oriented x 3] : ~L oriented x 3 [Well Nourished] : well nourished [Conjunctiva Non-injected] : conjunctiva non-injected [No Visual Lymphadenopathy] : no visual  lymphadenopathy [No Clubbing] : no clubbing [No Edema] : no edema [No Bromhidrosis] : no bromhidrosis [No Chromhidrosis] : no chromhidrosis [FreeTextEntry3] : whi superior scalp\par several small subcutaneous nodules scalp

## 2020-11-19 NOTE — HISTORY OF PRESENT ILLNESS
[FreeTextEntry1] : scalp [de-identified] : 54 yo F hx SLE on Plaquenil here for above\par here for spots on scalp, very itchy, had removed in past by a surgeon - dr chase (sp?) at [facility]\par now has more, given mometasone but does not help itch\par \par dry hands, washes w dove and uses vaseline intensive care

## 2020-11-19 NOTE — REVIEW OF SYSTEMS
Please schedule f/u with np this week if still having blood in urine and oab and UUI.    He has already had a gross hematuria workup including a ct, rbus, cytology and cysto  Cysto revealed prostate as likely source of bleeding.    If changing  To floma  Would check for uti and if negative repeat a urine cytology and consider starting an oab med if leaking   [Negative] : Genitourinary

## 2020-12-02 ENCOUNTER — APPOINTMENT (OUTPATIENT)
Dept: NEUROLOGY | Facility: CLINIC | Age: 55
End: 2020-12-02

## 2021-01-05 ENCOUNTER — APPOINTMENT (OUTPATIENT)
Dept: NEPHROLOGY | Facility: CLINIC | Age: 56
End: 2021-01-05
Payer: MEDICAID

## 2021-01-05 VITALS
HEART RATE: 84 BPM | DIASTOLIC BLOOD PRESSURE: 73 MMHG | HEIGHT: 63 IN | SYSTOLIC BLOOD PRESSURE: 114 MMHG | BODY MASS INDEX: 23.92 KG/M2 | WEIGHT: 135 LBS

## 2021-01-05 PROCEDURE — 99072 ADDL SUPL MATRL&STAF TM PHE: CPT

## 2021-01-05 PROCEDURE — 99214 OFFICE O/P EST MOD 30 MIN: CPT

## 2021-01-05 NOTE — HISTORY OF PRESENT ILLNESS
[FreeTextEntry1] : 55-year-old woman referred by Dr. Kc because of a finding of modest proteinuria, at that time 160 mg/day.  However, urinalysis here and urine microalbumin were normal.  She has a history of SLE treated with Plaquenil, with Raynaud's phenomenon and photosensitivity and diffuse polyarthralgias -but double-stranded DNA was normal.  Her creatinine was 0.6 with a GFR of 100.  Its become clear that she does not have lupus nephritis.  Her BP has never been elevated and is usually low normal.  She was found to have positive Covid antibody in the absence of acute symptoms, but has developed shortness of breath and chest discomfort that may represent long COVID-19, and is being referred to pulmonary for further evaluation.  It is possible that she developed transient mild proteinuria as a result of Covid renal involvement that was somewhat subtle

## 2021-01-05 NOTE — CONSULT LETTER
[Dear  ___] : Dear  [unfilled], [Consult Letter:] : I had the pleasure of evaluating your patient, [unfilled]. [Please see my note below.] : Please see my note below. [Consult Closing:] : Thank you very much for allowing me to participate in the care of this patient.  If you have any questions, please do not hesitate to contact me. [Sincerely,] : Sincerely, [FreeTextEntry2] : Dr Kc [FreeTextEntry1] : Happy New Year, Riya!   Burke Barksdale [FreeTextEntry3] : Sincerely, \par \par Mike Barksdale MD, FACP

## 2021-01-05 NOTE — ASSESSMENT
[FreeTextEntry1] : 55-year-old woman with a history of mild proteinuria, found shortly after Covid infection, with a history of SLE -there is no evidence that she has lupus nephritis.  In fact we could not demonstrate any significant proteinuria in October.  We are sending urine now for urinalysis and urine microalbumin again but hope to find no proteinuria again.  She is scheduled to return in 6 months, but if negative again, may not need steady follow-up.

## 2021-01-06 LAB
APPEARANCE: CLEAR
BACTERIA: NEGATIVE
BILIRUBIN URINE: NEGATIVE
BLOOD URINE: NEGATIVE
COLOR: NORMAL
CREAT SPEC-SCNC: 33 MG/DL
GLUCOSE QUALITATIVE U: NEGATIVE
HYALINE CASTS: 0 /LPF
KETONES URINE: NEGATIVE
LEUKOCYTE ESTERASE URINE: NEGATIVE
MICROALBUMIN 24H UR DL<=1MG/L-MCNC: <1.2 MG/DL
MICROALBUMIN/CREAT 24H UR-RTO: NORMAL MG/G
MICROSCOPIC-UA: NORMAL
NITRITE URINE: NEGATIVE
PH URINE: 7.5
PROTEIN URINE: NEGATIVE
RED BLOOD CELLS URINE: 1 /HPF
SPECIFIC GRAVITY URINE: 1.01
SQUAMOUS EPITHELIAL CELLS: 1 /HPF
UROBILINOGEN URINE: NORMAL
WHITE BLOOD CELLS URINE: 1 /HPF

## 2021-01-11 ENCOUNTER — LABORATORY RESULT (OUTPATIENT)
Age: 56
End: 2021-01-11

## 2021-01-14 ENCOUNTER — APPOINTMENT (OUTPATIENT)
Dept: PULMONOLOGY | Facility: CLINIC | Age: 56
End: 2021-01-14
Payer: MEDICAID

## 2021-01-14 VITALS
BODY MASS INDEX: 23.74 KG/M2 | DIASTOLIC BLOOD PRESSURE: 80 MMHG | HEIGHT: 63 IN | OXYGEN SATURATION: 97 % | TEMPERATURE: 97.9 F | WEIGHT: 134 LBS | HEART RATE: 70 BPM | SYSTOLIC BLOOD PRESSURE: 110 MMHG

## 2021-01-14 PROCEDURE — 99214 OFFICE O/P EST MOD 30 MIN: CPT | Mod: 25

## 2021-01-14 PROCEDURE — 99072 ADDL SUPL MATRL&STAF TM PHE: CPT

## 2021-01-14 PROCEDURE — 94729 DIFFUSING CAPACITY: CPT

## 2021-01-14 PROCEDURE — 94727 GAS DIL/WSHOT DETER LNG VOL: CPT

## 2021-01-14 PROCEDURE — 94060 EVALUATION OF WHEEZING: CPT

## 2021-01-15 ENCOUNTER — APPOINTMENT (OUTPATIENT)
Dept: PULMONOLOGY | Facility: CLINIC | Age: 56
End: 2021-01-15

## 2021-02-08 NOTE — ASSESSMENT
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 \par Minor decrement in FVC vs 3 mos ago.\par Repeat PFT in 3 mos again and see me. At that visit bring [facility].\par Home sleep test.\par --\par HST not covered by insurance.

## 2021-02-08 NOTE — HISTORY OF PRESENT ILLNESS
[TextBox_4] : 2020: Asked to evaluate patient by Dr Kc for dyspnea. States that in May she was in the shower and she passed out and was out for 2 hours. Came to, felt dizzy and anxious, vomited blood. Called EMS, vitals were fine so she stayed home due to Covid. She was seen at [facility] and the feeling was that she had sleep deprivation. Her   in  and her only son  in the  in 2018 and since then she struggles w depression and can't sleep. She is also unable to use her CPAP. But none of that relates to her visit today. She complains of dyspnea x months. She is dyspneic on 1.5 blocks. No history of lung disease. Never smoker. No cough or sputum. SLE treated by Dr Law with HCQ, formerly on prednisone but no longer. She saw a cardiologist last week, Dr Irizarry, and doesn't know the results of that evaluation as yet.\par \par 20: In the interim underwent Covid swab for PFT and tested positive, so follow up was delayed by isolation and wait for PFT. She stayed home, felt somewhat more dyspneic, had low grade temp 100.9, anosmia for a month, no GI symptoms. She feels back to her usual self now. Saw Dr Irizarry last week, told everything was normal. Remains dyspneic, has a sensation of her breath stopping in her mouth, can't sleep, and feels numb all over her body at night when she tries to go to sleep. Not using CPAP.\par \par 21: Went to [facility] ED last week for chest pain, told there was some abnormality on a CXR, had CTA w contrast reaction, was told this CT was normal. 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 Had some moderately elevated BPs afterward. She wants a sleep apnea test. This is because she woke from sleep last week feeling dyspneic. This happens often. Breathing is good and bad, some days fine, some days horrible. Rheum is Dr Deya Alan.\par

## 2021-02-08 NOTE — CONSULT LETTER
[Dear  ___] : Dear  [unfilled], [Courtesy Letter:] : I had the pleasure of seeing your patient, [unfilled], in my office today. [Please see my note below.] : Please see my note below. [Consult Closing:] : Thank you very much for allowing me to participate in the care of this patient.  If you have any questions, please do not hesitate to contact me. [Sincerely,] : Sincerely, [DrAfua  ___] : Dr. DESOUZA [FreeTextEntry2] : Anton Kc MD\par [address] \[place] [address]\par  [FreeTextEntry3] : Esme Rodriguez MD, FCCP\par

## 2021-04-12 ENCOUNTER — LABORATORY RESULT (OUTPATIENT)
Age: 56
End: 2021-04-12

## 2021-04-14 ENCOUNTER — APPOINTMENT (OUTPATIENT)
Dept: PULMONOLOGY | Facility: CLINIC | Age: 56
End: 2021-04-14
Payer: MEDICAID

## 2021-04-14 ENCOUNTER — NON-APPOINTMENT (OUTPATIENT)
Age: 56
End: 2021-04-14

## 2021-04-14 VITALS
HEIGHT: 63 IN | TEMPERATURE: 98 F | BODY MASS INDEX: 23.74 KG/M2 | WEIGHT: 134 LBS | DIASTOLIC BLOOD PRESSURE: 72 MMHG | OXYGEN SATURATION: 98 % | SYSTOLIC BLOOD PRESSURE: 100 MMHG | HEART RATE: 78 BPM

## 2021-04-14 PROCEDURE — 94727 GAS DIL/WSHOT DETER LNG VOL: CPT

## 2021-04-14 PROCEDURE — 94060 EVALUATION OF WHEEZING: CPT

## 2021-04-14 PROCEDURE — 99072 ADDL SUPL MATRL&STAF TM PHE: CPT

## 2021-04-14 PROCEDURE — 94729 DIFFUSING CAPACITY: CPT

## 2021-04-14 PROCEDURE — 99214 OFFICE O/P EST MOD 30 MIN: CPT | Mod: 25

## 2021-04-29 ENCOUNTER — APPOINTMENT (OUTPATIENT)
Dept: INTERNAL MEDICINE | Facility: CLINIC | Age: 56
End: 2021-04-29
Payer: MEDICAID

## 2021-04-29 VITALS
OXYGEN SATURATION: 96 % | DIASTOLIC BLOOD PRESSURE: 77 MMHG | WEIGHT: 138 LBS | SYSTOLIC BLOOD PRESSURE: 117 MMHG | HEIGHT: 63 IN | TEMPERATURE: 98.6 F | HEART RATE: 70 BPM | BODY MASS INDEX: 24.45 KG/M2

## 2021-04-29 DIAGNOSIS — Z00.01 ENCOUNTER FOR GENERAL ADULT MEDICAL EXAMINATION WITH ABNORMAL FINDINGS: ICD-10-CM

## 2021-04-29 PROCEDURE — 99072 ADDL SUPL MATRL&STAF TM PHE: CPT

## 2021-04-29 PROCEDURE — 99386 PREV VISIT NEW AGE 40-64: CPT | Mod: 25,GC

## 2021-04-29 PROCEDURE — 99203 OFFICE O/P NEW LOW 30 MIN: CPT | Mod: GC

## 2021-05-03 LAB
CHOLEST SERPL-MCNC: 173 MG/DL
ESTIMATED AVERAGE GLUCOSE: 94 MG/DL
HBA1C MFR BLD HPLC: 4.9 %
HCV AB SER QL: NONREACTIVE
HCV S/CO RATIO: 0.14 S/CO
HDLC SERPL-MCNC: 69 MG/DL
HIV1+2 AB SPEC QL IA.RAPID: NONREACTIVE
LDLC SERPL CALC-MCNC: 94 MG/DL
NONHDLC SERPL-MCNC: 104 MG/DL
TRIGL SERPL-MCNC: 53 MG/DL

## 2021-05-12 ENCOUNTER — OUTPATIENT (OUTPATIENT)
Dept: OUTPATIENT SERVICES | Facility: HOSPITAL | Age: 56
LOS: 1 days | End: 2021-05-12
Payer: COMMERCIAL

## 2021-05-12 ENCOUNTER — APPOINTMENT (OUTPATIENT)
Dept: SLEEP CENTER | Facility: HOME HEALTH | Age: 56
End: 2021-05-12
Payer: MEDICAID

## 2021-05-12 PROCEDURE — 95800 SLP STDY UNATTENDED: CPT | Mod: 26

## 2021-05-12 PROCEDURE — 95800 SLP STDY UNATTENDED: CPT

## 2021-05-13 DIAGNOSIS — G47.33 OBSTRUCTIVE SLEEP APNEA (ADULT) (PEDIATRIC): ICD-10-CM

## 2021-05-14 NOTE — HISTORY OF PRESENT ILLNESS
[TextBox_4] : 2020: Asked to evaluate patient by Dr Kc for dyspnea. States that in May she was in the shower and she passed out and was out for 2 hours. Came to, felt dizzy and anxious, vomited blood. Called EMS, vitals were fine so she stayed home due to Covid. She was seen at [facility] and the feeling was that she had sleep deprivation. Her   in  and her only son  in the  in 2018 and since then she struggles w depression and can't sleep. She is also unable to use her CPAP. But none of that relates to her visit today. She complains of dyspnea x months. She is dyspneic on 1.5 blocks. No history of lung disease. Never smoker. No cough or sputum. SLE treated by Dr Law with HCQ, formerly on prednisone but no longer. She saw a cardiologist last week, Dr Irizarry, and doesn't know the results of that evaluation as yet.\par \par 20: In the interim underwent Covid swab for PFT and tested positive, so follow up was delayed by isolation and wait for PFT. She stayed home, felt somewhat more dyspneic, had low grade temp 100.9, anosmia for a month, no GI symptoms. She feels back to her usual self now. Saw Dr Irizarry last week, told everything was normal. Remains dyspneic, has a sensation of her breath stopping in her mouth, can't sleep, and feels numb all over her body at night when she tries to go to sleep. Not using CPAP.\par \par 21: Went to [facility] [place] last week for chest pain, told there was some abnormality on a CXR, had CTA w contrast reaction, was told this CT was normal. 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 Had some moderately elevated BPs afterward. She wants a sleep apnea test. This is because she woke from sleep last week feeling dyspneic. This happens often. Breathing is good and bad, some days fine, some days horrible. Rheum is Dr Deya Alan.\par \par 21: Her rheumatologist and psychiatrist added Cymbalta for her pain and anxiety/depression, and this helped w the pain but not the anxiety/depression. She may be given a holiday from the [facility]. Has good and bad days, same w breathing, some days good and some not as good. Sleep test had been denied but she brings outside sleep records. She was found to have mild NEVIN in 2019 and used autoPAP and felt that her symptoms improved. She reports ongoing feeling of choking at night, waking up gasping every night, does wake up with dry mouth. Falls asleep watching tv. Has gained 10-12 lbs since her last test and is on potentially sedating meds that she was not on 2 years ago. She also complains of her L arm being numb at night. Had both Covid vaccines.\par

## 2021-05-14 NOTE — ASSESSMENT
[FreeTextEntry1] : Data reviewed:\par \par PSG 5/2019: AHI 5.9 / titrated to 9 cm H2O / compliance report showed median pressure 6.3\par \par PA/lat CXR R 6/2020 personally reviewed : clear lungs\par CT chest [facility] 10/21/20 personally reviewed : unremarkable\par CT chest [facility] [place] 1/2021 personally reviewed : clear lungs\par \par PFT [facility] 10/9/20: FVC 1.95L (63%), FEV1 1.66L (67%), TLC 3.01L (66%), DLCO 13.2 (62%)\par PFT 85 1/14/21: FVC 1.77L (60%), FEV1 1.52L (64%), TLC 2.91L (66%), DLCO 13.2 (64%) (61in)\par PFT 85 4/13/21: FVC 1.93L (61%), FEV1 1.66L (66%), TLC 3.18L (67%), DLCO 12.76 (59%) (63in)\par \par Impression:\par Dyspnea on exertion w SLE, restricted PFT, and normal lung parenchyma\par Nocturnal gasping, daytime sleepiness and known mild NEVIN\par \par Plan:\par The PFT is stable and the CT is clear.\par Will repeat PFT in 4 mos for surveillance again 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

## 2021-05-14 NOTE — CONSULT LETTER
[Dear  ___] : Dear  [unfilled], [Courtesy Letter:] : I had the pleasure of seeing your patient, [unfilled], in my office today. [Please see my note below.] : Please see my note below. [Consult Closing:] : Thank you very much for allowing me to participate in the care of this patient.  If you have any questions, please do not hesitate to contact me. [Sincerely,] : Sincerely, [DrAfua  ___] : Dr. DESOUZA [FreeTextEntry2] : Anton Kc MD\par [address] \[place] [address]\par  [FreeTextEntry3] : Esme Rodriguez MD, FCCP\par

## 2021-05-17 ENCOUNTER — NON-APPOINTMENT (OUTPATIENT)
Age: 56
End: 2021-05-17

## 2021-07-07 LAB
APPEARANCE: CLEAR
BACTERIA: NEGATIVE
BILIRUBIN URINE: NEGATIVE
BLOOD URINE: NEGATIVE
COLOR: COLORLESS
GLUCOSE QUALITATIVE U: NEGATIVE
HYALINE CASTS: 0 /LPF
KETONES URINE: NEGATIVE
LEUKOCYTE ESTERASE URINE: NEGATIVE
MICROSCOPIC-UA: NORMAL
NITRITE URINE: NEGATIVE
PH URINE: 7
PROTEIN URINE: NEGATIVE
RED BLOOD CELLS URINE: 0 /HPF
SPECIFIC GRAVITY URINE: 1.01
SQUAMOUS EPITHELIAL CELLS: 0 /HPF
UROBILINOGEN URINE: NORMAL
WHITE BLOOD CELLS URINE: 0 /HPF

## 2021-07-08 LAB
CREAT SPEC-SCNC: 20 MG/DL
MICROALBUMIN 24H UR DL<=1MG/L-MCNC: <1.2 MG/DL
MICROALBUMIN/CREAT 24H UR-RTO: NORMAL MG/G

## 2021-08-12 ENCOUNTER — APPOINTMENT (OUTPATIENT)
Dept: PULMONOLOGY | Facility: CLINIC | Age: 56
End: 2021-08-12
Payer: MEDICAID

## 2021-08-12 VITALS
HEIGHT: 63 IN | DIASTOLIC BLOOD PRESSURE: 70 MMHG | WEIGHT: 138 LBS | HEART RATE: 67 BPM | SYSTOLIC BLOOD PRESSURE: 118 MMHG | TEMPERATURE: 97.9 F | OXYGEN SATURATION: 98 % | BODY MASS INDEX: 24.45 KG/M2

## 2021-08-12 PROCEDURE — 94727 GAS DIL/WSHOT DETER LNG VOL: CPT

## 2021-08-12 PROCEDURE — 94060 EVALUATION OF WHEEZING: CPT

## 2021-08-12 PROCEDURE — 99072 ADDL SUPL MATRL&STAF TM PHE: CPT

## 2021-08-12 PROCEDURE — 99214 OFFICE O/P EST MOD 30 MIN: CPT | Mod: 25

## 2021-08-12 PROCEDURE — 94729 DIFFUSING CAPACITY: CPT

## 2021-08-12 NOTE — HISTORY OF PRESENT ILLNESS
[TextBox_4] : 2020: Asked to evaluate patient by Dr Kc for dyspnea. States that in May she was in the shower and she passed out and was out for 2 hours. Came to, felt dizzy and anxious, vomited blood. Called EMS, vitals were fine so she stayed home due to Covid. She was seen at [facility] and the feeling was that she had sleep deprivation. Her   in  and her only son  in the  in 2018 and since then she struggles w depression and can't sleep. She is also unable to use her CPAP. But none of that relates to her visit today. She complains of dyspnea x months. She is dyspneic on 1.5 blocks. No history of lung disease. Never smoker. No cough or sputum. SLE treated by Dr Law with HCQ, formerly on prednisone but no longer. She saw a cardiologist last week, Dr Irizarry, and doesn't know the results of that evaluation as yet.\par \par 20: In the interim underwent Covid swab for PFT and tested positive, so follow up was delayed by isolation and wait for PFT. She stayed home, felt somewhat more dyspneic, had low grade temp 100.9, anosmia for a month, no GI symptoms. She feels back to her usual self now. Saw Dr Irizarry last week, told everything was normal. Remains dyspneic, has a sensation of her breath stopping in her mouth, can't sleep, and feels numb all over her body at night when she tries to go to sleep. Not using CPAP.\par \par 21: Went to [facility] ED last week for chest pain, told there was some abnormality on a CXR, had CTA w contrast reaction, was told this CT was normal. 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 Had some moderately elevated BPs afterward. She wants a sleep apnea test. This is because she woke from sleep last week feeling dyspneic. This happens often. Breathing is good and bad, some days fine, some days horrible. Rheum is Dr Deya Alan.\par \par 21: Her rheumatologist and psychiatrist added Cymbalta for her pain and anxiety/depression, and this helped w the pain but not the anxiety/depression. She may be given a holiday from the [facility]. Has good and bad days, same w breathing, some days good and some not as good. Sleep test had been denied but she brings outside sleep records. She was found to have mild NEVIN in 2019 and used autoPAP and felt that her symptoms improved. She reports ongoing feeling of choking at night, waking up gasping every night, does wake up with dry mouth. Falls asleep watching tv. Has gained 10-12 lbs since her last test and is on potentially sedating meds that she was not on 2 years ago. She also complains of her L arm being numb at night. Had both Covid vaccines.\par \par 21: Using CPAP, shows me report w AHI 1.4 and feels much better. Complaining of pain in the back of her neck. Breathing is ok. Spending time caring for her father who is very ill (dying?). Her CPAP doesn't work there, only at home. Is this because he doesn't have wifi?\par

## 2021-08-12 NOTE — ASSESSMENT
[FreeTextEntry1] : Data reviewed:\par \par PSG 5/2019: AHI 5.9 / titrated to 9 cm H2O / compliance report showed median pressure 6.3\par HST 5/2021: AHI 16, no hypoxia\par \par PA/lat CXR [facility] 6/2020 personally reviewed : clear lungs\par CT chest [facility] 10/21/20 personally reviewed : unremarkable\par CT chest [facility] 1/2021 personally reviewed : clear lungs\par \par PFT [facility] 10/9/20: FVC 1.95L (63%), FEV1 1.66L (67%), TLC 3.01L (66%), DLCO 13.2 (62%)\par PFT [facility] 1/14/21: FVC 1.77L (60%), FEV1 1.52L (64%), TLC 2.91L (66%), DLCO 13.2 (64%) (61in)\par PFT [facility] 4/13/21: FVC 1.93L (61%), FEV1 1.66L (66%), TLC 3.18L (67%), DLCO 12.76 (59%) (63in)\par PFT [facility] 8/12/21: FVC 1.95L (66%), FEV1 1.63L (70%), TLC 3.14L (71%), DLCO 14.83 (72%) (61in)\par \par Impression:\par Dyspnea on exertion w SLE, restricted PFT, and normal lung parenchyma\par Mod NEVIN on CPAP\par \par Plan:\par PFTs have been stable for 10 months. Can repeat in 6-8 mos again and then space out.\par Cont CPAP for mod NEVIN. [place] machine for recall. Call company re why machine does not work at her father's house.

## 2021-10-03 VITALS
OXYGEN SATURATION: 98 % | SYSTOLIC BLOOD PRESSURE: 126 MMHG | TEMPERATURE: 98 F | HEART RATE: 101 BPM | HEIGHT: 63 IN | WEIGHT: 130.07 LBS | RESPIRATION RATE: 18 BRPM | DIASTOLIC BLOOD PRESSURE: 92 MMHG

## 2021-10-03 LAB
ANION GAP SERPL CALC-SCNC: 7 MMOL/L — SIGNIFICANT CHANGE UP (ref 5–17)
BASOPHILS # BLD AUTO: 0.03 K/UL — SIGNIFICANT CHANGE UP (ref 0–0.2)
BASOPHILS NFR BLD AUTO: 0.5 % — SIGNIFICANT CHANGE UP (ref 0–2)
BUN SERPL-MCNC: 12 MG/DL — SIGNIFICANT CHANGE UP (ref 7–23)
CALCIUM SERPL-MCNC: 9.9 MG/DL — SIGNIFICANT CHANGE UP (ref 8.4–10.5)
CHLORIDE SERPL-SCNC: 104 MMOL/L — SIGNIFICANT CHANGE UP (ref 96–108)
CO2 SERPL-SCNC: 31 MMOL/L — SIGNIFICANT CHANGE UP (ref 22–31)
CREAT SERPL-MCNC: 0.68 MG/DL — SIGNIFICANT CHANGE UP (ref 0.5–1.3)
EOSINOPHIL # BLD AUTO: 0.11 K/UL — SIGNIFICANT CHANGE UP (ref 0–0.5)
EOSINOPHIL NFR BLD AUTO: 2 % — SIGNIFICANT CHANGE UP (ref 0–6)
GLUCOSE SERPL-MCNC: 82 MG/DL — SIGNIFICANT CHANGE UP (ref 70–99)
HCT VFR BLD CALC: 36.9 % — SIGNIFICANT CHANGE UP (ref 34.5–45)
HGB BLD-MCNC: 11.6 G/DL — SIGNIFICANT CHANGE UP (ref 11.5–15.5)
IMM GRANULOCYTES NFR BLD AUTO: 0.2 % — SIGNIFICANT CHANGE UP (ref 0–1.5)
LYMPHOCYTES # BLD AUTO: 1.93 K/UL — SIGNIFICANT CHANGE UP (ref 1–3.3)
LYMPHOCYTES # BLD AUTO: 34.3 % — SIGNIFICANT CHANGE UP (ref 13–44)
MCHC RBC-ENTMCNC: 29.9 PG — SIGNIFICANT CHANGE UP (ref 27–34)
MCHC RBC-ENTMCNC: 31.4 GM/DL — LOW (ref 32–36)
MCV RBC AUTO: 95.1 FL — SIGNIFICANT CHANGE UP (ref 80–100)
MONOCYTES # BLD AUTO: 0.68 K/UL — SIGNIFICANT CHANGE UP (ref 0–0.9)
MONOCYTES NFR BLD AUTO: 12.1 % — SIGNIFICANT CHANGE UP (ref 2–14)
NEUTROPHILS # BLD AUTO: 2.87 K/UL — SIGNIFICANT CHANGE UP (ref 1.8–7.4)
NEUTROPHILS NFR BLD AUTO: 50.9 % — SIGNIFICANT CHANGE UP (ref 43–77)
NRBC # BLD: 0 /100 WBCS — SIGNIFICANT CHANGE UP (ref 0–0)
PLATELET # BLD AUTO: 241 K/UL — SIGNIFICANT CHANGE UP (ref 150–400)
POTASSIUM SERPL-MCNC: 4.4 MMOL/L — SIGNIFICANT CHANGE UP (ref 3.5–5.3)
POTASSIUM SERPL-SCNC: 4.4 MMOL/L — SIGNIFICANT CHANGE UP (ref 3.5–5.3)
RBC # BLD: 3.88 M/UL — SIGNIFICANT CHANGE UP (ref 3.8–5.2)
RBC # FLD: 11.9 % — SIGNIFICANT CHANGE UP (ref 10.3–14.5)
SODIUM SERPL-SCNC: 142 MMOL/L — SIGNIFICANT CHANGE UP (ref 135–145)
WBC # BLD: 5.63 K/UL — SIGNIFICANT CHANGE UP (ref 3.8–10.5)
WBC # FLD AUTO: 5.63 K/UL — SIGNIFICANT CHANGE UP (ref 3.8–10.5)

## 2021-10-03 PROCEDURE — 93010 ELECTROCARDIOGRAM REPORT: CPT

## 2021-10-03 PROCEDURE — 72141 MRI NECK SPINE W/O DYE: CPT | Mod: 26,MA

## 2021-10-03 PROCEDURE — 70498 CT ANGIOGRAPHY NECK: CPT | Mod: 26,MA

## 2021-10-03 PROCEDURE — 99285 EMERGENCY DEPT VISIT HI MDM: CPT

## 2021-10-03 PROCEDURE — 70496 CT ANGIOGRAPHY HEAD: CPT | Mod: 26,MA

## 2021-10-03 RX ORDER — KETOROLAC TROMETHAMINE 30 MG/ML
15 SYRINGE (ML) INJECTION ONCE
Refills: 0 | Status: DISCONTINUED | OUTPATIENT
Start: 2021-10-03 | End: 2021-10-03

## 2021-10-03 RX ORDER — MORPHINE SULFATE 50 MG/1
4 CAPSULE, EXTENDED RELEASE ORAL ONCE
Refills: 0 | Status: DISCONTINUED | OUTPATIENT
Start: 2021-10-03 | End: 2021-10-03

## 2021-10-03 RX ORDER — ONDANSETRON 8 MG/1
4 TABLET, FILM COATED ORAL ONCE
Refills: 0 | Status: COMPLETED | OUTPATIENT
Start: 2021-10-03 | End: 2021-10-03

## 2021-10-03 RX ORDER — SODIUM CHLORIDE 9 MG/ML
1000 INJECTION INTRAMUSCULAR; INTRAVENOUS; SUBCUTANEOUS ONCE
Refills: 0 | Status: COMPLETED | OUTPATIENT
Start: 2021-10-03 | End: 2021-10-03

## 2021-10-03 RX ORDER — CYCLOBENZAPRINE HYDROCHLORIDE 10 MG/1
10 TABLET, FILM COATED ORAL ONCE
Refills: 0 | Status: COMPLETED | OUTPATIENT
Start: 2021-10-03 | End: 2021-10-03

## 2021-10-03 RX ORDER — DULOXETINE HYDROCHLORIDE 30 MG/1
0 CAPSULE, DELAYED RELEASE ORAL
Qty: 0 | Refills: 0 | DISCHARGE

## 2021-10-03 RX ORDER — LIDOCAINE 4 G/100G
1 CREAM TOPICAL ONCE
Refills: 0 | Status: COMPLETED | OUTPATIENT
Start: 2021-10-03 | End: 2021-10-03

## 2021-10-03 RX ORDER — HYDROXYCHLOROQUINE SULFATE 200 MG
0 TABLET ORAL
Qty: 0 | Refills: 0 | DISCHARGE

## 2021-10-03 RX ADMIN — Medication 40 MILLIGRAM(S): at 20:31

## 2021-10-03 RX ADMIN — SODIUM CHLORIDE 1000 MILLILITER(S): 9 INJECTION INTRAMUSCULAR; INTRAVENOUS; SUBCUTANEOUS at 18:34

## 2021-10-03 RX ADMIN — Medication 15 MILLIGRAM(S): at 18:34

## 2021-10-03 RX ADMIN — SODIUM CHLORIDE 1000 MILLILITER(S): 9 INJECTION INTRAMUSCULAR; INTRAVENOUS; SUBCUTANEOUS at 17:27

## 2021-10-03 RX ADMIN — MORPHINE SULFATE 4 MILLIGRAM(S): 50 CAPSULE, EXTENDED RELEASE ORAL at 18:34

## 2021-10-03 RX ADMIN — ONDANSETRON 4 MILLIGRAM(S): 8 TABLET, FILM COATED ORAL at 17:26

## 2021-10-03 RX ADMIN — MORPHINE SULFATE 4 MILLIGRAM(S): 50 CAPSULE, EXTENDED RELEASE ORAL at 17:26

## 2021-10-03 RX ADMIN — LIDOCAINE 1 PATCH: 4 CREAM TOPICAL at 17:26

## 2021-10-03 RX ADMIN — Medication 15 MILLIGRAM(S): at 17:25

## 2021-10-03 RX ADMIN — CYCLOBENZAPRINE HYDROCHLORIDE 10 MILLIGRAM(S): 10 TABLET, FILM COATED ORAL at 17:25

## 2021-10-03 NOTE — ED ADULT NURSE REASSESSMENT NOTE - NS ED NURSE REASSESS COMMENT FT1
CT scan done, results pending.  States mild improvement in left arm, left sided pain.  Vital signs stable.  Results and disposition pending.

## 2021-10-03 NOTE — ED PROVIDER NOTE - PROGRESS NOTE DETAILS
Labs nl, ct's neg, mri ordered.  Pt reports no sig change in pain w meds but does not want more narcotics 2/2 not liking how the morphine made her feel.  Steroid ordered to help w any inflammation contributing to sx.  Pt signed out to Dr Plasencia and [facility]. MRI shows some cord compression, seen by ortho, will admit

## 2021-10-03 NOTE — ED PROVIDER NOTE - CARE PLAN
Principal Discharge DX:	Neck pain  Secondary Diagnosis:	Left arm pain  Secondary Diagnosis:	Headache   1

## 2021-10-03 NOTE — ED ADULT NURSE REASSESSMENT NOTE - NS ED NURSE REASSESS COMMENT FT1
MRI resulted, Ortho consult pending.  Patient a/oX 3, states left arm pain improved, no new symptom complaint.  Vital signs stable. Disposition pending.

## 2021-10-03 NOTE — ED PROVIDER NOTE - CLINICAL SUMMARY MEDICAL DECISION MAKING FREE TEXT BOX
Pt c/o L head and neck pain radiating to LUE w numbness, weakness progressive x 1 wk.  + deficits on exam ? if weak 2/2 pain worse w movement vs true deficit.  ? radiculopathy, ? cva (unlikely to give lue pain), ? dissection.  Plan labs, ct/cta head and cta neck, consider mri c spine, pain meds; reassess.

## 2021-10-03 NOTE — ED PROVIDER NOTE - MUSCULOSKELETAL, MLM
Spine appears normal, + L scalp ttp, ttp midline and lateral L neck and upper back, no bony ttp LUE, + from all ext, radial 1+

## 2021-10-03 NOTE — ED PROVIDER NOTE - CONSTITUTIONAL, MLM
Well appearing, awake, alert, oriented to person, place, time/situation and painful distress. normal...

## 2021-10-03 NOTE — ED ADULT NURSE NOTE - NSIMPLEMENTINTERV_GEN_ALL_ED
Implemented All Universal Safety Interventions:  [place] to call system. Call bell, personal items and telephone within reach. Instruct patient to call for assistance. Room bathroom lighting operational. Non-slip footwear when patient is off stretcher. Physically safe environment: no spills, clutter or unnecessary equipment. Stretcher in lowest position, wheels locked, appropriate side rails in place.

## 2021-10-03 NOTE — ED ADULT NURSE REASSESSMENT NOTE - NS ED NURSE REASSESS COMMENT FT1
Patient a/oX3, anxious, c/o of left sided head and neck pain, radiates to left arm w/ numbness and tingling, pain on raising left arm, no other neuro deficits. Vital signs stable.  Right Ac PIV #20 in place, all labs sent, no complications.  NSS 1 L bolus ongoing, adminitered Toradol, Morphine Zofran IVP,  Cyclobenzaprine 10mg PO.  CT scan pending.  Stable and comfortable.

## 2021-10-03 NOTE — ED PROVIDER NOTE - NEUROLOGICAL, MLM
Alert and oriented, no focal deficits, cn grossly intact, motor 5/5 and no sens deficits RUE/RLE/LLE, + decreased motor 4/5 LUE, decreased sensation light tough LUE w/o dermatomal distribution

## 2021-10-03 NOTE — ED PROVIDER NOTE - OBJECTIVE STATEMENT
57 yo female h/o sle, ra, fibromyalgia c/o 1 wk progressive, now severe pain L head, neck, upper back, LUE w numbness/weakness in LUE.  No change in vision/speech/gait, numbness or weakness in RUE, bilat LE ext   No h/o similar sx, trauma, neck issues  No relief w tylenol, voltaren gel, muscle relaxant prescribed by her doctor.  No fever.  No cp.  + sob but pt states this is baseline for her and unchanged x years.

## 2021-10-03 NOTE — ED ADULT NURSE NOTE - OBJECTIVE STATEMENT
Patient states has Hx of Lupus and pituitary adenoma, c/o of 1 week left sided head, neck and arm pain, w/ numbness and tingling to left arm and fingers, not able to raise left arm above chest level due to pain, w/ tenderness and pain on palptation, no swelling or deformity noted.  States has chronic SOB, no difficulty speaking in long sentences.  States topical pain meds not working.  No other neuro deficits.

## 2021-10-03 NOTE — ED PROVIDER NOTE - NSICDXPASTMEDICALHX_GEN_ALL_CORE_FT
PAST MEDICAL HISTORY:  Lupus      PAST MEDICAL HISTORY:  Fibromyalgia     Lupus     Rheumatoid arthritis

## 2021-10-03 NOTE — ED ADULT TRIAGE NOTE - CHIEF COMPLAINT QUOTE
" I have left side numbness since thursday with neck pain going down to my shoulder and I also have shortness of breath".

## 2021-10-04 ENCOUNTER — INPATIENT (INPATIENT)
Facility: HOSPITAL | Age: 56
LOS: 2 days | Discharge: HOME HEALTH | DRG: 472 | End: 2021-10-07
Attending: STUDENT IN AN ORGANIZED HEALTH CARE EDUCATION/TRAINING PROGRAM | Admitting: STUDENT IN AN ORGANIZED HEALTH CARE EDUCATION/TRAINING PROGRAM
Payer: OTHER MISCELLANEOUS

## 2021-10-04 ENCOUNTER — TRANSCRIPTION ENCOUNTER (OUTPATIENT)
Age: 56
End: 2021-10-04

## 2021-10-04 DIAGNOSIS — M79.7 FIBROMYALGIA: ICD-10-CM

## 2021-10-04 DIAGNOSIS — J98.4 OTHER DISORDERS OF LUNG: ICD-10-CM

## 2021-10-04 DIAGNOSIS — M32.9 SYSTEMIC LUPUS ERYTHEMATOSUS, UNSPECIFIED: ICD-10-CM

## 2021-10-04 DIAGNOSIS — Z01.818 ENCOUNTER FOR OTHER PREPROCEDURAL EXAMINATION: ICD-10-CM

## 2021-10-04 DIAGNOSIS — M06.9 RHEUMATOID ARTHRITIS, UNSPECIFIED: ICD-10-CM

## 2021-10-04 DIAGNOSIS — G47.33 OBSTRUCTIVE SLEEP APNEA (ADULT) (PEDIATRIC): ICD-10-CM

## 2021-10-04 LAB
APPEARANCE UR: CLEAR — SIGNIFICANT CHANGE UP
APTT BLD: 29.6 SEC — SIGNIFICANT CHANGE UP (ref 27.5–35.5)
BACTERIA # UR AUTO: PRESENT /HPF
BILIRUB UR-MCNC: NEGATIVE — SIGNIFICANT CHANGE UP
BLD GP AB SCN SERPL QL: NEGATIVE — SIGNIFICANT CHANGE UP
BLD GP AB SCN SERPL QL: NEGATIVE — SIGNIFICANT CHANGE UP
COLOR SPEC: YELLOW — SIGNIFICANT CHANGE UP
DIFF PNL FLD: NEGATIVE — SIGNIFICANT CHANGE UP
EPI CELLS # UR: SIGNIFICANT CHANGE UP /HPF (ref 0–5)
GLUCOSE UR QL: NEGATIVE — SIGNIFICANT CHANGE UP
HCG UR QL: NEGATIVE — SIGNIFICANT CHANGE UP
HCV AB S/CO SERPL IA: 0.04 S/CO — SIGNIFICANT CHANGE UP
HCV AB SERPL-IMP: SIGNIFICANT CHANGE UP
INR BLD: 1.05 — SIGNIFICANT CHANGE UP (ref 0.88–1.16)
KETONES UR-MCNC: NEGATIVE — SIGNIFICANT CHANGE UP
LEUKOCYTE ESTERASE UR-ACNC: ABNORMAL
NITRITE UR-MCNC: NEGATIVE — SIGNIFICANT CHANGE UP
PH UR: 7 — SIGNIFICANT CHANGE UP (ref 5–8)
PROT UR-MCNC: NEGATIVE MG/DL — SIGNIFICANT CHANGE UP
PROTHROM AB SERPL-ACNC: 12.6 SEC — SIGNIFICANT CHANGE UP (ref 10.6–13.6)
RBC CASTS # UR COMP ASSIST: < 5 /HPF — SIGNIFICANT CHANGE UP
RH IG SCN BLD-IMP: POSITIVE — SIGNIFICANT CHANGE UP
RH IG SCN BLD-IMP: POSITIVE — SIGNIFICANT CHANGE UP
SARS-COV-2 RNA SPEC QL NAA+PROBE: NEGATIVE — SIGNIFICANT CHANGE UP
SP GR SPEC: 1.01 — SIGNIFICANT CHANGE UP (ref 1–1.03)
UROBILINOGEN FLD QL: 0.2 E.U./DL — SIGNIFICANT CHANGE UP
WBC UR QL: ABNORMAL /HPF

## 2021-10-04 PROCEDURE — 72125 CT NECK SPINE W/O DYE: CPT | Mod: 26

## 2021-10-04 PROCEDURE — 99223 1ST HOSP IP/OBS HIGH 75: CPT | Mod: GC

## 2021-10-04 PROCEDURE — 71045 X-RAY EXAM CHEST 1 VIEW: CPT | Mod: 26

## 2021-10-04 PROCEDURE — 72050 X-RAY EXAM NECK SPINE 4/5VWS: CPT | Mod: 26

## 2021-10-04 RX ORDER — CHLORHEXIDINE GLUCONATE 213 G/1000ML
1 SOLUTION TOPICAL
Refills: 0 | Status: COMPLETED | OUTPATIENT
Start: 2021-10-04 | End: 2021-10-07

## 2021-10-04 RX ORDER — SODIUM CHLORIDE 9 MG/ML
500 INJECTION INTRAMUSCULAR; INTRAVENOUS; SUBCUTANEOUS ONCE
Refills: 0 | Status: COMPLETED | OUTPATIENT
Start: 2021-10-04 | End: 2021-10-04

## 2021-10-04 RX ORDER — INFLUENZA VIRUS VACCINE 15; 15; 15; 15 UG/.5ML; UG/.5ML; UG/.5ML; UG/.5ML
0.5 SUSPENSION INTRAMUSCULAR ONCE
Refills: 0 | Status: DISCONTINUED | OUTPATIENT
Start: 2021-10-04 | End: 2021-10-07

## 2021-10-04 RX ORDER — OXYCODONE HYDROCHLORIDE 5 MG/1
10 TABLET ORAL EVERY 4 HOURS
Refills: 0 | Status: DISCONTINUED | OUTPATIENT
Start: 2021-10-04 | End: 2021-10-07

## 2021-10-04 RX ORDER — POVIDONE-IODINE 5 %
1 AEROSOL (ML) TOPICAL ONCE
Refills: 0 | Status: COMPLETED | OUTPATIENT
Start: 2021-10-04 | End: 2021-10-05

## 2021-10-04 RX ORDER — POVIDONE-IODINE 5 %
1 AEROSOL (ML) TOPICAL ONCE
Refills: 0 | Status: DISCONTINUED | OUTPATIENT
Start: 2021-10-05 | End: 2021-10-07

## 2021-10-04 RX ORDER — HYDROMORPHONE HYDROCHLORIDE 2 MG/ML
0.5 INJECTION INTRAMUSCULAR; INTRAVENOUS; SUBCUTANEOUS EVERY 4 HOURS
Refills: 0 | Status: DISCONTINUED | OUTPATIENT
Start: 2021-10-04 | End: 2021-10-07

## 2021-10-04 RX ORDER — HYDROXYCHLOROQUINE SULFATE 200 MG
200 TABLET ORAL EVERY 24 HOURS
Refills: 0 | Status: DISCONTINUED | OUTPATIENT
Start: 2021-10-04 | End: 2021-10-04

## 2021-10-04 RX ORDER — APREPITANT 80 MG/1
40 CAPSULE ORAL ONCE
Refills: 0 | Status: COMPLETED | OUTPATIENT
Start: 2021-10-05 | End: 2021-10-05

## 2021-10-04 RX ORDER — ONDANSETRON 8 MG/1
4 TABLET, FILM COATED ORAL ONCE
Refills: 0 | Status: COMPLETED | OUTPATIENT
Start: 2021-10-04 | End: 2021-10-04

## 2021-10-04 RX ORDER — ACETAMINOPHEN 500 MG
1000 TABLET ORAL ONCE
Refills: 0 | Status: COMPLETED | OUTPATIENT
Start: 2021-10-05 | End: 2021-10-05

## 2021-10-04 RX ORDER — SENNA PLUS 8.6 MG/1
2 TABLET ORAL AT BEDTIME
Refills: 0 | Status: DISCONTINUED | OUTPATIENT
Start: 2021-10-04 | End: 2021-10-05

## 2021-10-04 RX ORDER — PANTOPRAZOLE SODIUM 20 MG/1
40 TABLET, DELAYED RELEASE ORAL
Refills: 0 | Status: DISCONTINUED | OUTPATIENT
Start: 2021-10-04 | End: 2021-10-07

## 2021-10-04 RX ORDER — OXYCODONE HYDROCHLORIDE 5 MG/1
5 TABLET ORAL EVERY 4 HOURS
Refills: 0 | Status: DISCONTINUED | OUTPATIENT
Start: 2021-10-04 | End: 2021-10-07

## 2021-10-04 RX ORDER — HYDROXYCHLOROQUINE SULFATE 200 MG
200 TABLET ORAL DAILY
Refills: 0 | Status: DISCONTINUED | OUTPATIENT
Start: 2021-10-04 | End: 2021-10-07

## 2021-10-04 RX ORDER — SODIUM CHLORIDE 9 MG/ML
1000 INJECTION, SOLUTION INTRAVENOUS
Refills: 0 | Status: DISCONTINUED | OUTPATIENT
Start: 2021-10-05 | End: 2021-10-05

## 2021-10-04 RX ORDER — GABAPENTIN 400 MG/1
300 CAPSULE ORAL ONCE
Refills: 0 | Status: COMPLETED | OUTPATIENT
Start: 2021-10-05 | End: 2021-10-05

## 2021-10-04 RX ADMIN — PANTOPRAZOLE SODIUM 40 MILLIGRAM(S): 20 TABLET, DELAYED RELEASE ORAL at 06:46

## 2021-10-04 RX ADMIN — Medication 5 MILLIGRAM(S): at 06:02

## 2021-10-04 RX ADMIN — SENNA PLUS 2 TABLET(S): 8.6 TABLET ORAL at 21:19

## 2021-10-04 RX ADMIN — CHLORHEXIDINE GLUCONATE 1 APPLICATION(S): 213 SOLUTION TOPICAL at 12:46

## 2021-10-04 RX ADMIN — SODIUM CHLORIDE 500 MILLILITER(S): 9 INJECTION INTRAMUSCULAR; INTRAVENOUS; SUBCUTANEOUS at 03:04

## 2021-10-04 RX ADMIN — ONDANSETRON 4 MILLIGRAM(S): 8 TABLET, FILM COATED ORAL at 03:04

## 2021-10-04 RX ADMIN — Medication 200 MILLIGRAM(S): at 13:28

## 2021-10-04 NOTE — DISCHARGE NOTE PROVIDER - CARE PROVIDER_API CALL
Olivier Lewis)  [facility]  [address] [place] [address], 10[address] [place] [address]  Phone: [phone]  Fax: [phone]  Follow Up Time: 2 weeks

## 2021-10-04 NOTE — DISCHARGE NOTE PROVIDER - NSDCFUSCHEDAPPT_GEN_ALL_CORE_FT
[facility] ; 10/21/2021 ; NPP Med GenInt [address]  [facility] ; 12/10/2021 ; NPP Ophthal [address]  [facility] ; 10/21/2021 ; NPP Med GenInt [address] [facility] ; 12/10/2021 ; NPP Ophthal [address] [facility] ; 01/04/2022 ; NPP Nephro [address]  [facility] ; 10/15/2021 ; NPP Med GenInt [address] [facility] ; 10/21/2021 ; NPP Med GenInt [address] [facility] ; 12/10/2021 ; NPP Ophthal [address] [facility] ; 01/04/2022 ; [facility] Nephro [address] ; 10/15/2021 ; NPP Med GenInt [address] [facility] ; 10/21/2021 ; NPP Med GenInt [address] [facility] ; 10/22/2021 ; NPP OrthoSurg [facility] ; 12/10/2021 ; NPP Ophthal [address] [facility] ; 01/04/2022 ; NPP Nephro [address]

## 2021-10-04 NOTE — DISCHARGE NOTE PROVIDER - CARE PROVIDERS DIRECT ADDRESSES
[email or web address]

## 2021-10-04 NOTE — CONSULT NOTE ADULT - SUBJECTIVE AND OBJECTIVE BOX
Patient is a 56y old  Female who presents with a chief complaint of severe neck and shoulder pain (04 Oct 2021 10:52)      HPI:  Orthopaedic Surgery Consult Note    Attending Physician: Dr. Lewis  Consult requested by: ED    CC: Severe L neck pain    HPI:  56yFemale with SLE, RA, NEVIN, Fibromyalgia who present with 9/10 neck pain that has progressed in severity over the past week. She started experiencing numbness/tingling in the LUE a few days ago. She has tried NSAIDs with no relief of pain. Patient denies recent trauma or surgery, changes in vision or gait, saddle anesthesia, urinary/fecal incontinence.     Allergies    penicillins (Hives)  shellfish (Unknown)    Intolerances      PAST MEDICAL & SURGICAL HISTORY:  Lupus    Rheumatoid arthritis    Fibromyalgia        Meds:  chlorhexidine 2% Cloths 1 Application(s) Topical <User Schedule>  HYDROmorphone  Injectable 0.5 milliGRAM(s) IV Push every 4 hours PRN  ondansetron Injectable 4 milliGRAM(s) IV Push once  oxyCODONE    IR 5 milliGRAM(s) Oral every 4 hours PRN  oxyCODONE    IR 10 milliGRAM(s) Oral every 4 hours PRN  pantoprazole    Tablet 40 milliGRAM(s) Oral before breakfast  povidone iodine 5% Nasal Swab 1 Application(s) Both Nostrils once  senna 2 Tablet(s) Oral at bedtime  sodium chloride 0.9% Bolus 500 milliLiter(s) IV Bolus once      Family History:  Denies family history of bleeding disorders    Social History:   Pt is a nonsmoker  Social EtOH use            (04 Oct 2021 02:55)      PAST MEDICAL & SURGICAL HISTORY:  Lupus    Rheumatoid arthritis    Fibromyalgia        FAMILY HISTORY:      SOCIAL HISTORY:  Smoking Status: [ ] Current, [ ] Former, [ x] Never  Pack Years:    MEDICATIONS:  Pulmonary:    Antimicrobials:  hydroxychloroquine 200 milliGRAM(s) Oral daily    Anticoagulants:    Onc:    GI/:  pantoprazole    Tablet 40 milliGRAM(s) Oral before breakfast  senna 2 Tablet(s) Oral at bedtime    Endocrine:  predniSONE   Tablet 5 milliGRAM(s) Oral daily    Cardiac:    Other Medications:  chlorhexidine 2% Cloths 1 Application(s) Topical <User Schedule>  HYDROmorphone  Injectable 0.5 milliGRAM(s) IV Push every 4 hours PRN  influenza   Vaccine 0.5 milliLiter(s) IntraMuscular once  oxyCODONE    IR 5 milliGRAM(s) Oral every 4 hours PRN  oxyCODONE    IR 10 milliGRAM(s) Oral every 4 hours PRN  povidone iodine 5% Nasal Swab 1 Application(s) Both Nostrils once      Allergies    penicillins (Hives)  shellfish (Unknown)    Intolerances        Review of Systems:   •	General: negative  •	Skin/Breast: negative  •	Ophthalmologic: negative  •	ENMT: negative  •	Respiratory and Thorax: negative  •	Cardiovascular: negative  •	Gastrointestinal: negative  •	Genitourinary: negative  •	Musculoskeletal: negative  •	Neurological: negative  •	Psychiatric: negative  •	Hematology/Lymphatics: negative  •	Endocrine: negative  •	Allergic/Immunologic: negative    Physical Exam:   • Constitutional:	refer to dietitian/nutritionist note  • Eyes:	EOMI; PERRL; no drainage or redness  • ENMT:	No oral lesions; no gross abnormalities  • Neck	No bruits; no thyromegaly or nodules  • Breasts:	not examined  • Back:	No deformity or limitation of movement  • Respiratory:	Breath Sounds equal & clear to percussion & auscultation, no accessory muscle use  • Cardiovascular:	Regular rate & rhythm, normal S1, S2; no murmurs, gallops or rubs; no S3, S4  • Gastrointestinal:	Soft, non-tender, no hepatosplenomegaly, normal bowel sounds  • Genitourinary:	not examined  • Rectal: not examined  • Extremities:	No cyanosis, clubbing or edema  • Vascular:	Equal and normal pulses (carotid, femoral, dorsalis pedis)  • Neurologica:l	not examined  • Skin:	No lesions; no rash  • Lymph Nodes:	No lymphadedenopathy  • Musculoskeletal:	No joint pain, swelling or deformity; no limitation of movement      Vital Signs Last 24 Hrs  T(C): 36.7 (04 Oct 2021 09:27), Max: 36.8 (04 Oct 2021 08:25)  T(F): 98.1 (04 Oct 2021 09:27), Max: 98.2 (04 Oct 2021 08:25)  HR: 83 (04 Oct 2021 09:27) (65 - 101)  BP: 110/80 (04 Oct 2021 09:27) (94/60 - 138/89)  BP(mean): --  RR: 17 (04 Oct 2021 09:27) (17 - 18)  SpO2: 96% (04 Oct 2021 09:27) (94% - 99%)    10-04 @ 07:01  -  10-04 @ 11:35  --------------------------------------------------------  IN: 0 mL / OUT: 400 mL / NET: -400 mL          LABS:      CBC Full  -  ( 03 Oct 2021 17:21 )  WBC Count : 5.63 K/uL  RBC Count : 3.88 M/uL  Hemoglobin : 11.6 g/dL  Hematocrit : 36.9 %  Platelet Count - Automated : 241 K/uL  Mean Cell Volume : 95.1 fl  Mean Cell Hemoglobin : 29.9 pg  Mean Cell Hemoglobin Concentration : 31.4 gm/dL  Auto Neutrophil # : 2.87 K/uL  Auto Lymphocyte # : 1.93 K/uL  Auto Monocyte # : 0.68 K/uL  Auto Eosinophil # : 0.11 K/uL  Auto Basophil # : 0.03 K/uL  Auto Neutrophil % : 50.9 %  Auto Lymphocyte % : 34.3 %  Auto Monocyte % : 12.1 %  Auto Eosinophil % : 2.0 %  Auto Basophil % : 0.5 %    10-03    142  |  104  |  12  ----------------------------<  82  4.4   |  31  |  0.68    Ca    9.9      03 Oct 2021 17:21      PT/INR - ( 04 Oct 2021 02:46 )   PT: 12.6 sec;   INR: 1.05          PTT - ( 04 Oct 2021 02:46 )  PTT:29.6 sec  CXR clear  EKG RSR normal  Urinalysis Basic - ( 04 Oct 2021 06:43 )    Color: Yellow / Appearance: Clear / S.010 / pH: x  Gluc: x / Ketone: NEGATIVE  / Bili: Negative / Urobili: 0.2 E.U./dL   Blood: x / Protein: NEGATIVE mg/dL / Nitrite: NEGATIVE   Leuk Esterase: Small / RBC: < 5 /HPF / WBC 5-10 /HPF   Sq Epi: x / Non Sq Epi: 0-5 /HPF / Bacteria: Present /HPF                RADIOLOGY & ADDITIONAL STUDIES (The following images were personally reviewed):

## 2021-10-04 NOTE — PROGRESS NOTE ADULT - SUBJECTIVE AND OBJECTIVE BOX
Ortho Note      Subjective:  Pt comfortable without complaints, pain controlled with current pain medication regimen.   Denies CP, SOB, N/V, numbness/tingling     Vital Signs Last 24 Hrs  T(C): 36.7 (10-04-21 @ 09:27), Max: 36.8 (10-04-21 @ 08:25)  T(F): 98.1 (10-04-21 @ 09:27), Max: 98.2 (10-04-21 @ 08:25)  HR: 83 (10-04-21 @ 09:27) (83 - 98)  BP: 110/80 (10-04-21 @ 09:27) (108/65 - 110/80)  BP(mean): --  RR: 17 (10-04-21 @ 09:27) (17 - 18)  SpO2: 96% (10-04-21 @ 09:27) (96% - 99%)  AVSS      Objective:    Physical Exam:  General: Pt Alert and oriented, NAD  Pulses: +2 pedal pulses, wwp toes, cap refill less than 3 seconds  Sensation: silt intact denies numbness and tingiling  Motor: biceps.triceps,deltoids- firing, patient reports weakness to her LUE, rom 4/5 versus 5/5        Plan of Care:  A/P: 56yFemale s/p C4-C5 ventral cord compression plan for OR  - afebrile, nontoxic apperance   - Pain Control- ERAS protocol  - DVT ppx: hold for OR   - PT, WBS: WBAT   - bowel regimen, IS use  - npo at midnight, maintenance fluids  - medical clearance  - Dispo- OR tomorrow pending medical clearance    Ortho Pager [phone] Ortho Note      Subjective:  Pt comfortable without complaints, patient reporting left upper extremity weakness, numbness and tingling to her left upper extremity and her 4th and 5th fingers of her left hand.    Denies CP, SOB, N/V.       Vital Signs Last 24 Hrs  T(C): 36.7 (10-04-21 @ 09:27), Max: 36.8 (10-04-21 @ 08:25)  T(F): 98.1 (10-04-21 @ 09:27), Max: 98.2 (10-04-21 @ 08:25)  HR: 83 (10-04-21 @ 09:27) (83 - 98)  BP: 110/80 (10-04-21 @ 09:27) (108/65 - 110/80)  BP(mean): --  RR: 17 (10-04-21 @ 09:27) (17 - 18)  SpO2: 96% (10-04-21 @ 09:27) (96% - 99%)  AVSS      Objective:    Physical Exam:  General: Pt Alert and oriented, NAD  Pulses: +2 pedal pulses, wwp toes, cap refill less than 3 seconds  Sensation: silt intact denies numbness and tingiling  Motor: biceps.triceps,deltoids- firing, patient reports weakness to her LUE, rom 4/5 versus 5/5        Plan of Care:  A/P: 56yFemale s/p C4-C5 ventral cord compression plan for OR  - afebrile, nontoxic appearance   - Pain Control- ERAS protocol  - DVT ppx: hold for OR   - PT, WBS: WBAT   - bowel regimen, IS use  - npo at midnight, maintenance fluids  - medical clearance  - Dispo- OR tomorrow pending medical clearance    Ortho Pager [phone]

## 2021-10-04 NOTE — DISCHARGE NOTE PROVIDER - NSDCACTIVITY_GEN_ALL_CORE
Showering allowed/Walking - Indoors allowed/No heavy lifting/straining Do not drive or operate machinery/Showering allowed/Walking - Indoors allowed/No heavy lifting/straining

## 2021-10-04 NOTE — DISCHARGE NOTE PROVIDER - NSDCMRMEDTOKEN_GEN_ALL_CORE_FT
Cymbalta:   DULoxetine:   Plaquenil 200 mg oral tablet:   predniSONE 5 mg oral tablet: 1 tab(s) orally once a day   acetaminophen 500 mg oral tablet: 2 tab(s) orally every 8 hours  Cymbalta:   DULoxetine:   methocarbamol 500 mg oral tablet: 1 tab(s) orally every 8 hours, as needed for muscle spasms MDD:3  oxyCODONE 5 mg oral tablet: 1 tab(s) orally every 4-6 hours, as needed for moderate to severe pain MDD:6  Plaquenil 200 mg oral tablet:   predniSONE 5 mg oral tablet: 1 tab(s) orally once a day  pregabalin 50 mg oral capsule: 1 cap(s) orally 3 times a day MDD:3   acetaminophen 500 mg oral tablet: 2 tab(s) orally every 8 hours  Cymbalta:   DULoxetine:   methocarbamol 500 mg oral tablet: 1 tab(s) orally every 8 hours  oxyCODONE 5 mg oral tablet: 1 tab(s) orally every 4 hours, As needed, Moderate Pain (4 - 6)  Plaquenil 200 mg oral tablet:   predniSONE 5 mg oral tablet: 1 tab(s) orally once a day  pregabalin 50 mg oral capsule: 1 cap(s) orally 3 times a day MDD:3   acetaminophen 500 mg oral tablet: 2 tab(s) orally every 8 hours  Cymbalta:   DULoxetine:   methocarbamol 500 mg oral tablet: 1 tab(s) orally every 8 hours  oxyCODONE 5 mg oral tablet: 1 tab(s) orally every 4 hours, As needed, Moderate Pain (4 - 6)  Plaquenil 200 mg oral tablet:   predniSONE 5 mg oral delayed release tablet: 1 tab(s) orally once a day   predniSONE 5 mg oral tablet: 1 tab(s) orally once a day   predniSONE 5 mg oral tablet: 1 tab(s) orally once a day   predniSONE 5 mg oral tablet: 1 tab(s) orally once a day  pregabalin 50 mg oral capsule: 1 cap(s) orally 3 times a day MDD:3

## 2021-10-04 NOTE — DISCHARGE NOTE PROVIDER - NSDCCPCAREPLAN_GEN_ALL_CORE_FT
PRINCIPAL DISCHARGE DIAGNOSIS  Diagnosis: Cervical spinal cord compression  Assessment and Plan of Treatment: C4-C5 ventral cord compression,      SECONDARY DISCHARGE DIAGNOSES  Diagnosis: Left arm pain  Assessment and Plan of Treatment:     Diagnosis: Headache  Assessment and Plan of Treatment:

## 2021-10-04 NOTE — DISCHARGE NOTE PROVIDER - NSDCFUADDINST_GEN_ALL_CORE_FT
You may eat regular diet as tolerated. If you have trouble swallowing; try liquids and softer foods and slowly progress to your regular diet.  No strenuous activity (bending/twisting), heavy lifting, driving or returning to work until cleared by MD.  Change dressing daily with gauze/tape till post-op day #5, then leave incision open to air. You have a prineo dressing under the gauze. It looks like a piece of tape. Do not remove this until your follow-up with Dr. Lewis.  Keep your cervical collar on at all times. You may removed it for dressing or showering.  You may shower, keep incision clean and dry.   Try to have regular bowel movements, take stool softener or laxative if necessary.  May take pepcid or zantac for upset stomach.  May apply ice to affected areas to decrease swelling.  Call to schedule an appt with Dr. Lewis for follow up, if you have staples or sutures they will be removed in office.  Contact your doctor if you experience: fever greater than 101.5, chills, chest pain, difficulty breathing, redness or excessive drainage around the incision, other concerns.  Follow up with your primary care provider.   You may eat regular diet as tolerated. If you have trouble swallowing; try liquids and softer foods and slowly progress to your regular diet.    No strenuous activity (bending/twisting), heavy lifting, driving, exercising, or returning to work until cleared by MD.    Change dressing daily with gauze/tape till post-op day #5, then leave incision open to air. You have a prineo dressing under the gauze. It looks like a piece of tape. Do not remove this   until your follow-up with Dr. Lewis.    Keep your cervical collar on at all times. You may removed it for dressing or showering.    You may shower, keep incision clean and dry.     Try to have regular bowel movements, take stool softener or laxative if necessary.    May take pepcid or zantac for upset stomach.    May apply ice to affected areas to decrease swelling.    Call to schedule an appt with Dr. Lewis for follow up, if you have staples or sutures they will be removed in office.    Contact your doctor if you experience: fever greater than 101.5, chills, chest pain, difficulty breathing, redness or excessive drainage around the incision, other concerns.    Follow up with your primary care provider.

## 2021-10-04 NOTE — DISCHARGE NOTE PROVIDER - HOSPITAL COURSE
Admitted: 10/03/2021  Surgery: ***  Mandy-op Antibiotics  Pain control  DVT prophylaxis  OOB/Physical Therapy   Admitted: 10/03/2021  Surgery: 10/5/21- C4-C5 ACDF   Mandy-op Antibiotics  Pain control  DVT prophylaxis  OOB/Physical Therapy

## 2021-10-04 NOTE — H&P ADULT - NSHPPHYSICALEXAM_GEN_ALL_CORE
C-spine  Midline C-spine tenderness  Full ROM passive and active  2+ radial pulses, fingers wwp, cap refill <3 sec  Decreased sensation over L shoulder, and 4th and 5th digits  4/5 strength with L shoulder abduction, arm flexion,  strength

## 2021-10-04 NOTE — CONSULT NOTE ADULT - PROBLEM SELECTOR RECOMMENDATION 6
The patient's medical condition is optimized for surgery.  There is no contraindication for surgery.  There is no clinical evidence neither of angina, decompensated CHF, arrhthymias, nor valvular disease.   There is no limitation of exercise capacity.  MET is 4 .  ASA class is 2.  Pacheco cardiac risk factor is  low.  DVT prophylaxis is indicated.  Pain control.  Early mobilization.  Avoid fluid overload.    Patient was evaluated for dyspnea by Dr. Rodriguez.  The PFT was unremarkable.  Hand condition most likely related to her musculoskeletal and connective tissue disorder.

## 2021-10-04 NOTE — H&P ADULT - NSHPLABSRESULTS_GEN_ALL_CORE
Labs:                        11.6   5.63  )-----------( 241      ( 03 Oct 2021 17:21 )             36.9     10-03    142  |  104  |  12  ----------------------------<  82  4.4   |  31  |  0.68    Ca    9.9      03 Oct 2021 17:21          Imaging:     MRI C-spine w/ contrast: ventral cord compression at C4-C5

## 2021-10-04 NOTE — H&P ADULT - HISTORY OF PRESENT ILLNESS
Orthopaedic Surgery Consult Note    Attending Physician: Dr. Lewis  Consult requested by: ED    CC: Severe L neck pain    HPI:  56yFemale with SLE, RA, NEVIN, Fibromyalgia who present with 9/10 neck pain that has progressed in severity over the past week. She started experiencing numbness/tingling in the LUE a few days ago. She has tried NSAIDs with no relief of pain. Patient denies recent trauma or surgery, changes in vision or gait, saddle anesthesia, urinary/fecal incontinence.     Allergies    penicillins (Hives)  shellfish (Unknown)    Intolerances      PAST MEDICAL & SURGICAL HISTORY:  Lupus    Rheumatoid arthritis    Fibromyalgia        Meds:  chlorhexidine 2% Cloths 1 Application(s) Topical <User Schedule>  HYDROmorphone  Injectable 0.5 milliGRAM(s) IV Push every 4 hours PRN  ondansetron Injectable 4 milliGRAM(s) IV Push once  oxyCODONE    IR 5 milliGRAM(s) Oral every 4 hours PRN  oxyCODONE    IR 10 milliGRAM(s) Oral every 4 hours PRN  pantoprazole    Tablet 40 milliGRAM(s) Oral before breakfast  povidone iodine 5% Nasal Swab 1 Application(s) Both Nostrils once  senna 2 Tablet(s) Oral at bedtime  sodium chloride 0.9% Bolus 500 milliLiter(s) IV Bolus once      Family History:  Denies family history of bleeding disorders    Social History:   Pt is a nonsmoker  Social EtOH use

## 2021-10-04 NOTE — CONSULT NOTE ADULT - PROBLEM SELECTOR RECOMMENDATION 9
The patient is followed by rheumatology.  The patient is on prednisone and Plaquenil.  Her condition is stable and continue current medicine.  No indication for stress dose of steroids

## 2021-10-04 NOTE — H&P ADULT - ATTENDING COMMENTS
Judit Gamez is a 56 year old female presenting with severe cervical radiculopathy and MRI findings of C4-C5 cord compression with cord signal change.  She has decreased sensation in her left forearm and hand.  She reports weakness in her hands and issues with balance.  We reviewed her imaging and discussed the natural history of cord compression.  We discussed surgical intervention and its alternatives. Surgery would be an ACDF with iliac crest graft.  We discussed the risks and benefits of surgery including anesthesia, blood loss, need for blood transfusion, clots, stroke, myocardial infarct, chronic pain, loss of function, nonunion, dysphagia, dysphonia, infection, durotomy, hardware failure, paralysis, death, no improvement in symptoms, Covid-19 and need for reoperation.  The patient understands the goals of surgery are to prevent progression of her symptoms.  She may not have complete resolution of her pain due to her chronic pain from RA and fibromyalgia.  She understands these risks and expectations from surgery.  She asked several great questions all of which were answered.  She will proceed with surgical intervention.  She will require medical clearance.  NPO for OR today.

## 2021-10-04 NOTE — H&P ADULT - ASSESSMENT
A/P: 56yFemale w Lupus, RA, NEVIN, fibromyalgia presenting with ventral cord compression at C4-C5  - stable  - pain control  - admit to ortho  - f/u Cervical Xrays and CT C spine  - medical clearance in AM  - added on for C4-C5 ACDF  - consent  - NPO for possible OR today  - PT, WBS: WBAT  - Discussed with Attending, Dr. Joshua Layne, PGY-1  Ortho Pager [phone]

## 2021-10-04 NOTE — CONSULT NOTE ADULT - PROBLEM SELECTOR RECOMMENDATION 5
Patient is compliant with the CPAP.  Avoid oversedation.  Continue CPAP.  Her AHI was 12.  The setting on CPAP should be 5.

## 2021-10-05 DIAGNOSIS — G95.20 UNSPECIFIED CORD COMPRESSION: ICD-10-CM

## 2021-10-05 LAB
ANION GAP SERPL CALC-SCNC: 6 MMOL/L — SIGNIFICANT CHANGE UP (ref 5–17)
BUN SERPL-MCNC: 16 MG/DL — SIGNIFICANT CHANGE UP (ref 7–23)
CALCIUM SERPL-MCNC: 9.3 MG/DL — SIGNIFICANT CHANGE UP (ref 8.4–10.5)
CHLORIDE SERPL-SCNC: 106 MMOL/L — SIGNIFICANT CHANGE UP (ref 96–108)
CO2 SERPL-SCNC: 29 MMOL/L — SIGNIFICANT CHANGE UP (ref 22–31)
COVID-19 SPIKE DOMAIN AB INTERP: POSITIVE
COVID-19 SPIKE DOMAIN ANTIBODY RESULT: >250 U/ML — HIGH
CREAT SERPL-MCNC: 0.68 MG/DL — SIGNIFICANT CHANGE UP (ref 0.5–1.3)
GLUCOSE SERPL-MCNC: 84 MG/DL — SIGNIFICANT CHANGE UP (ref 70–99)
HCT VFR BLD CALC: 34.6 % — SIGNIFICANT CHANGE UP (ref 34.5–45)
HGB BLD-MCNC: 10.9 G/DL — LOW (ref 11.5–15.5)
MCHC RBC-ENTMCNC: 30.2 PG — SIGNIFICANT CHANGE UP (ref 27–34)
MCHC RBC-ENTMCNC: 31.5 GM/DL — LOW (ref 32–36)
MCV RBC AUTO: 95.8 FL — SIGNIFICANT CHANGE UP (ref 80–100)
NRBC # BLD: 0 /100 WBCS — SIGNIFICANT CHANGE UP (ref 0–0)
PLATELET # BLD AUTO: 255 K/UL — SIGNIFICANT CHANGE UP (ref 150–400)
POTASSIUM SERPL-MCNC: 3.9 MMOL/L — SIGNIFICANT CHANGE UP (ref 3.5–5.3)
POTASSIUM SERPL-SCNC: 3.9 MMOL/L — SIGNIFICANT CHANGE UP (ref 3.5–5.3)
RBC # BLD: 3.61 M/UL — LOW (ref 3.8–5.2)
RBC # FLD: 12.1 % — SIGNIFICANT CHANGE UP (ref 10.3–14.5)
SARS-COV-2 IGG+IGM SERPL QL IA: >250 U/ML — HIGH
SARS-COV-2 IGG+IGM SERPL QL IA: POSITIVE
SODIUM SERPL-SCNC: 141 MMOL/L — SIGNIFICANT CHANGE UP (ref 135–145)
WBC # BLD: 6.88 K/UL — SIGNIFICANT CHANGE UP (ref 3.8–10.5)
WBC # FLD AUTO: 6.88 K/UL — SIGNIFICANT CHANGE UP (ref 3.8–10.5)

## 2021-10-05 PROCEDURE — 22551 ARTHRD ANT NTRBDY CERVICAL: CPT

## 2021-10-05 PROCEDURE — 20937 SP BONE AGRFT MORSEL ADD-ON: CPT

## 2021-10-05 PROCEDURE — 22845 INSERT SPINE FIXATION DEVICE: CPT | Mod: 59

## 2021-10-05 PROCEDURE — 22853 INSJ BIOMECHANICAL DEVICE: CPT

## 2021-10-05 RX ORDER — METOCLOPRAMIDE HCL 10 MG
5 TABLET ORAL EVERY 6 HOURS
Refills: 0 | Status: DISCONTINUED | OUTPATIENT
Start: 2021-10-05 | End: 2021-10-07

## 2021-10-05 RX ORDER — ONDANSETRON 8 MG/1
4 TABLET, FILM COATED ORAL EVERY 6 HOURS
Refills: 0 | Status: DISCONTINUED | OUTPATIENT
Start: 2021-10-05 | End: 2021-10-07

## 2021-10-05 RX ORDER — SODIUM CHLORIDE 9 MG/ML
1000 INJECTION, SOLUTION INTRAVENOUS
Refills: 0 | Status: DISCONTINUED | OUTPATIENT
Start: 2021-10-05 | End: 2021-10-07

## 2021-10-05 RX ORDER — CEFAZOLIN SODIUM 1 G
2000 VIAL (EA) INJECTION EVERY 8 HOURS
Refills: 0 | Status: COMPLETED | OUTPATIENT
Start: 2021-10-05 | End: 2021-10-06

## 2021-10-05 RX ORDER — ACETAMINOPHEN 500 MG
1000 TABLET ORAL EVERY 8 HOURS
Refills: 0 | Status: DISCONTINUED | OUTPATIENT
Start: 2021-10-05 | End: 2021-10-07

## 2021-10-05 RX ORDER — METHOCARBAMOL 500 MG/1
500 TABLET, FILM COATED ORAL EVERY 8 HOURS
Refills: 0 | Status: DISCONTINUED | OUTPATIENT
Start: 2021-10-05 | End: 2021-10-07

## 2021-10-05 RX ORDER — POLYETHYLENE GLYCOL 3350 17 G/17G
17 POWDER, FOR SOLUTION ORAL DAILY
Refills: 0 | Status: DISCONTINUED | OUTPATIENT
Start: 2021-10-05 | End: 2021-10-07

## 2021-10-05 RX ORDER — SENNA PLUS 8.6 MG/1
2 TABLET ORAL AT BEDTIME
Refills: 0 | Status: DISCONTINUED | OUTPATIENT
Start: 2021-10-05 | End: 2021-10-07

## 2021-10-05 RX ORDER — HYDROMORPHONE HYDROCHLORIDE 2 MG/ML
0.5 INJECTION INTRAMUSCULAR; INTRAVENOUS; SUBCUTANEOUS
Refills: 0 | Status: DISCONTINUED | OUTPATIENT
Start: 2021-10-05 | End: 2021-10-07

## 2021-10-05 RX ADMIN — Medication 1000 MILLIGRAM(S): at 08:40

## 2021-10-05 RX ADMIN — GABAPENTIN 300 MILLIGRAM(S): 400 CAPSULE ORAL at 08:40

## 2021-10-05 RX ADMIN — SODIUM CHLORIDE 100 MILLILITER(S): 9 INJECTION, SOLUTION INTRAVENOUS at 00:16

## 2021-10-05 RX ADMIN — HYDROMORPHONE HYDROCHLORIDE 0.5 MILLIGRAM(S): 2 INJECTION INTRAMUSCULAR; INTRAVENOUS; SUBCUTANEOUS at 12:53

## 2021-10-05 RX ADMIN — Medication 1000 MILLIGRAM(S): at 22:00

## 2021-10-05 RX ADMIN — Medication 50 MILLIGRAM(S): at 22:00

## 2021-10-05 RX ADMIN — HYDROMORPHONE HYDROCHLORIDE 0.5 MILLIGRAM(S): 2 INJECTION INTRAMUSCULAR; INTRAVENOUS; SUBCUTANEOUS at 12:38

## 2021-10-05 RX ADMIN — Medication 1000 MILLIGRAM(S): at 18:10

## 2021-10-05 RX ADMIN — Medication 5 MILLIGRAM(S): at 05:56

## 2021-10-05 RX ADMIN — PANTOPRAZOLE SODIUM 40 MILLIGRAM(S): 20 TABLET, DELAYED RELEASE ORAL at 05:57

## 2021-10-05 RX ADMIN — HYDROMORPHONE HYDROCHLORIDE 0.5 MILLIGRAM(S): 2 INJECTION INTRAMUSCULAR; INTRAVENOUS; SUBCUTANEOUS at 12:20

## 2021-10-05 RX ADMIN — APREPITANT 40 MILLIGRAM(S): 80 CAPSULE ORAL at 08:40

## 2021-10-05 RX ADMIN — METHOCARBAMOL 500 MILLIGRAM(S): 500 TABLET, FILM COATED ORAL at 18:11

## 2021-10-05 RX ADMIN — HYDROMORPHONE HYDROCHLORIDE 0.5 MILLIGRAM(S): 2 INJECTION INTRAMUSCULAR; INTRAVENOUS; SUBCUTANEOUS at 13:25

## 2021-10-05 RX ADMIN — METHOCARBAMOL 500 MILLIGRAM(S): 500 TABLET, FILM COATED ORAL at 22:00

## 2021-10-05 RX ADMIN — Medication 1000 MILLIGRAM(S): at 18:40

## 2021-10-05 RX ADMIN — Medication 200 MILLIGRAM(S): at 18:10

## 2021-10-05 RX ADMIN — Medication 50 MILLIGRAM(S): at 18:10

## 2021-10-05 RX ADMIN — HYDROMORPHONE HYDROCHLORIDE 0.5 MILLIGRAM(S): 2 INJECTION INTRAMUSCULAR; INTRAVENOUS; SUBCUTANEOUS at 12:35

## 2021-10-05 RX ADMIN — Medication 1000 MILLIGRAM(S): at 23:00

## 2021-10-05 RX ADMIN — Medication 1 APPLICATION(S): at 08:40

## 2021-10-05 RX ADMIN — HYDROMORPHONE HYDROCHLORIDE 0.5 MILLIGRAM(S): 2 INJECTION INTRAMUSCULAR; INTRAVENOUS; SUBCUTANEOUS at 13:40

## 2021-10-05 RX ADMIN — SENNA PLUS 2 TABLET(S): 8.6 TABLET ORAL at 22:00

## 2021-10-05 RX ADMIN — Medication 2000 MILLIGRAM(S): at 18:11

## 2021-10-05 RX ADMIN — SODIUM CHLORIDE 100 MILLILITER(S): 9 INJECTION, SOLUTION INTRAVENOUS at 14:02

## 2021-10-05 RX ADMIN — CHLORHEXIDINE GLUCONATE 1 APPLICATION(S): 213 SOLUTION TOPICAL at 06:57

## 2021-10-05 NOTE — BRIEF OPERATIVE NOTE - NSICDXBRIEFPROCEDURE_GEN_ALL_CORE_FT
PROCEDURES:  Anterior cervical discectomy and fusion (ACDF) at single level 05-Oct-2021 12:32:09  Rafy Bowling

## 2021-10-05 NOTE — PROGRESS NOTE ADULT - SUBJECTIVE AND OBJECTIVE BOX
Ortho Post Op Check    Procedure: ACDF C4-5   Surgeon: Dr. Lewis     Patient seen and examined at bedside in the PACU   Pt endorses to neck pain   Denies CP, SOB, N/V, numbness/tingling     Vital Signs Last 24 Hrs  T(C): 36.1 (10-05-21 @ 12:12), Max: 36.1 (10-05-21 @ 12:12)  T(F): 97 (10-05-21 @ 12:12), Max: 97 (10-05-21 @ 12:12)  HR: 66 (10-05-21 @ 13:42) (66 - 86)  BP: 122/75 (10-05-21 @ 13:42) (122/72 - 153/106)  BP(mean): 91 (10-05-21 @ 13:42) (91 - 122)  RR: 16 (10-05-21 @ 13:42) (16 - 39)  SpO2: 99% (10-05-21 @ 13:42) (94% - 100%)  AVSS    General: Pt Alert and oriented, NAD  Dressing C/D/I: gauze/tegaderm, HV x1 in place   Pulses: 2+ radial pulses bilaterally   Sensation: intact to light touch bilateral upper extremities   Motor:  strenght, biceps, triceps 4/5 LUE, 5/5 RUE, consistent with preop exam         Post-op X-Ray: hardware in place     A/P: 56yFemale POD#0 s/p ACDF C4-5     - Stable in PACU   - Pain Control: IV and PO PRN   - DVT ppx: SCDs  - Post op abx: ancef x2 post op   - PT, WBS: WBAT, cervical collar   - monitor drain output     Ortho Pager [phone]

## 2021-10-05 NOTE — BRIEF OPERATIVE NOTE - SPECIMENS
September 27, 2019      Mirna Sinclair MD  [address]  [facility]  [facility] [address]           [facility]  [address] [place] [address]  Phone: [phone]  Fax: [phone]          Patient: Vincent Jacob Saladino   MR Number: [id number]   YOB: 2019   Date of Visit: 9/27/2019       Dear Dr. Mirna Sinclair:    Thank you for referring Vincent Saladino to me for evaluation. Attached you will find relevant portions of my assessment and plan of care.    If you have questions, please do not hesitate to call me. I look forward to following Vincent Saladino along with you.    Sincerely,    Umberto Plaza MD    Enclosure  CC:  No Recipients    If you would like to receive this communication electronically, please contact [email or web address] or [phone] to request more information on [email or web address] Link access.    For providers and/or their staff who would like to refer a patient to [facility], please contact us through our one-stop-shop provider referral line, [facility], at [phone].    If you feel you have received this communication in error or would no longer like to receive these types of communications, please e-mail [email or web address]         
None

## 2021-10-06 DIAGNOSIS — Z98.890 OTHER SPECIFIED POSTPROCEDURAL STATES: ICD-10-CM

## 2021-10-06 DIAGNOSIS — D62 ACUTE POSTHEMORRHAGIC ANEMIA: ICD-10-CM

## 2021-10-06 LAB
ANION GAP SERPL CALC-SCNC: 7 MMOL/L — SIGNIFICANT CHANGE UP (ref 5–17)
BASOPHILS # BLD AUTO: 0.03 K/UL — SIGNIFICANT CHANGE UP (ref 0–0.2)
BASOPHILS NFR BLD AUTO: 0.3 % — SIGNIFICANT CHANGE UP (ref 0–2)
BUN SERPL-MCNC: 11 MG/DL — SIGNIFICANT CHANGE UP (ref 7–23)
CALCIUM SERPL-MCNC: 9.5 MG/DL — SIGNIFICANT CHANGE UP (ref 8.4–10.5)
CHLORIDE SERPL-SCNC: 105 MMOL/L — SIGNIFICANT CHANGE UP (ref 96–108)
CO2 SERPL-SCNC: 28 MMOL/L — SIGNIFICANT CHANGE UP (ref 22–31)
CREAT SERPL-MCNC: 0.59 MG/DL — SIGNIFICANT CHANGE UP (ref 0.5–1.3)
EOSINOPHIL # BLD AUTO: 0.01 K/UL — SIGNIFICANT CHANGE UP (ref 0–0.5)
EOSINOPHIL NFR BLD AUTO: 0.1 % — SIGNIFICANT CHANGE UP (ref 0–6)
GLUCOSE SERPL-MCNC: 91 MG/DL — SIGNIFICANT CHANGE UP (ref 70–99)
HCT VFR BLD CALC: 33.2 % — LOW (ref 34.5–45)
HGB BLD-MCNC: 10.4 G/DL — LOW (ref 11.5–15.5)
IMM GRANULOCYTES NFR BLD AUTO: 0.6 % — SIGNIFICANT CHANGE UP (ref 0–1.5)
LYMPHOCYTES # BLD AUTO: 2.01 K/UL — SIGNIFICANT CHANGE UP (ref 1–3.3)
LYMPHOCYTES # BLD AUTO: 20.7 % — SIGNIFICANT CHANGE UP (ref 13–44)
MCHC RBC-ENTMCNC: 30.1 PG — SIGNIFICANT CHANGE UP (ref 27–34)
MCHC RBC-ENTMCNC: 31.3 GM/DL — LOW (ref 32–36)
MCV RBC AUTO: 96 FL — SIGNIFICANT CHANGE UP (ref 80–100)
MONOCYTES # BLD AUTO: 0.92 K/UL — HIGH (ref 0–0.9)
MONOCYTES NFR BLD AUTO: 9.5 % — SIGNIFICANT CHANGE UP (ref 2–14)
NEUTROPHILS # BLD AUTO: 6.67 K/UL — SIGNIFICANT CHANGE UP (ref 1.8–7.4)
NEUTROPHILS NFR BLD AUTO: 68.8 % — SIGNIFICANT CHANGE UP (ref 43–77)
NRBC # BLD: 0 /100 WBCS — SIGNIFICANT CHANGE UP (ref 0–0)
PLATELET # BLD AUTO: 242 K/UL — SIGNIFICANT CHANGE UP (ref 150–400)
POTASSIUM SERPL-MCNC: 4.1 MMOL/L — SIGNIFICANT CHANGE UP (ref 3.5–5.3)
POTASSIUM SERPL-SCNC: 4.1 MMOL/L — SIGNIFICANT CHANGE UP (ref 3.5–5.3)
RBC # BLD: 3.46 M/UL — LOW (ref 3.8–5.2)
RBC # FLD: 12.2 % — SIGNIFICANT CHANGE UP (ref 10.3–14.5)
SODIUM SERPL-SCNC: 140 MMOL/L — SIGNIFICANT CHANGE UP (ref 135–145)
WBC # BLD: 9.7 K/UL — SIGNIFICANT CHANGE UP (ref 3.8–10.5)
WBC # FLD AUTO: 9.7 K/UL — SIGNIFICANT CHANGE UP (ref 3.8–10.5)

## 2021-10-06 PROCEDURE — 99232 SBSQ HOSP IP/OBS MODERATE 35: CPT | Mod: GC

## 2021-10-06 PROCEDURE — 72040 X-RAY EXAM NECK SPINE 2-3 VW: CPT | Mod: 26

## 2021-10-06 RX ORDER — BENZOCAINE AND MENTHOL 5; 1 G/100ML; G/100ML
1 LIQUID ORAL
Refills: 0 | Status: DISCONTINUED | OUTPATIENT
Start: 2021-10-06 | End: 2021-10-07

## 2021-10-06 RX ORDER — METHOCARBAMOL 500 MG/1
1 TABLET, FILM COATED ORAL
Qty: 21 | Refills: 0
Start: 2021-10-06 | End: 2021-10-12

## 2021-10-06 RX ORDER — ACETAMINOPHEN 500 MG
2 TABLET ORAL
Qty: 42 | Refills: 0
Start: 2021-10-06 | End: 2021-10-12

## 2021-10-06 RX ORDER — OXYCODONE HYDROCHLORIDE 5 MG/1
1 TABLET ORAL
Qty: 30 | Refills: 0
Start: 2021-10-06 | End: 2021-10-10

## 2021-10-06 RX ADMIN — OXYCODONE HYDROCHLORIDE 10 MILLIGRAM(S): 5 TABLET ORAL at 13:36

## 2021-10-06 RX ADMIN — Medication 50 MILLIGRAM(S): at 22:17

## 2021-10-06 RX ADMIN — CHLORHEXIDINE GLUCONATE 1 APPLICATION(S): 213 SOLUTION TOPICAL at 06:13

## 2021-10-06 RX ADMIN — OXYCODONE HYDROCHLORIDE 10 MILLIGRAM(S): 5 TABLET ORAL at 04:32

## 2021-10-06 RX ADMIN — SENNA PLUS 2 TABLET(S): 8.6 TABLET ORAL at 22:17

## 2021-10-06 RX ADMIN — Medication 1000 MILLIGRAM(S): at 07:10

## 2021-10-06 RX ADMIN — Medication 2000 MILLIGRAM(S): at 00:40

## 2021-10-06 RX ADMIN — Medication 1000 MILLIGRAM(S): at 22:16

## 2021-10-06 RX ADMIN — METHOCARBAMOL 500 MILLIGRAM(S): 500 TABLET, FILM COATED ORAL at 22:17

## 2021-10-06 RX ADMIN — BENZOCAINE AND MENTHOL 1 LOZENGE: 5; 1 LIQUID ORAL at 20:00

## 2021-10-06 RX ADMIN — Medication 1000 MILLIGRAM(S): at 16:16

## 2021-10-06 RX ADMIN — OXYCODONE HYDROCHLORIDE 10 MILLIGRAM(S): 5 TABLET ORAL at 05:30

## 2021-10-06 RX ADMIN — Medication 5 MILLIGRAM(S): at 06:14

## 2021-10-06 RX ADMIN — Medication 1000 MILLIGRAM(S): at 23:00

## 2021-10-06 RX ADMIN — SODIUM CHLORIDE 100 MILLILITER(S): 9 INJECTION, SOLUTION INTRAVENOUS at 02:28

## 2021-10-06 RX ADMIN — HYDROMORPHONE HYDROCHLORIDE 0.5 MILLIGRAM(S): 2 INJECTION INTRAMUSCULAR; INTRAVENOUS; SUBCUTANEOUS at 21:14

## 2021-10-06 RX ADMIN — Medication 1000 MILLIGRAM(S): at 15:44

## 2021-10-06 RX ADMIN — POLYETHYLENE GLYCOL 3350 17 GRAM(S): 17 POWDER, FOR SOLUTION ORAL at 12:30

## 2021-10-06 RX ADMIN — METHOCARBAMOL 500 MILLIGRAM(S): 500 TABLET, FILM COATED ORAL at 15:44

## 2021-10-06 RX ADMIN — Medication 50 MILLIGRAM(S): at 06:14

## 2021-10-06 RX ADMIN — Medication 200 MILLIGRAM(S): at 12:30

## 2021-10-06 RX ADMIN — METHOCARBAMOL 500 MILLIGRAM(S): 500 TABLET, FILM COATED ORAL at 06:14

## 2021-10-06 RX ADMIN — PANTOPRAZOLE SODIUM 40 MILLIGRAM(S): 20 TABLET, DELAYED RELEASE ORAL at 06:14

## 2021-10-06 RX ADMIN — HYDROMORPHONE HYDROCHLORIDE 0.5 MILLIGRAM(S): 2 INJECTION INTRAMUSCULAR; INTRAVENOUS; SUBCUTANEOUS at 20:41

## 2021-10-06 RX ADMIN — OXYCODONE HYDROCHLORIDE 10 MILLIGRAM(S): 5 TABLET ORAL at 13:06

## 2021-10-06 RX ADMIN — Medication 1000 MILLIGRAM(S): at 06:14

## 2021-10-06 RX ADMIN — Medication 50 MILLIGRAM(S): at 15:44

## 2021-10-06 NOTE — OCCUPATIONAL THERAPY INITIAL EVALUATION ADULT - PERTINENT HX OF CURRENT PROBLEM, REHAB EVAL
56y old  Female who presents with a chief complaint of severe neck and shoulder pain now s/p ACDF C4-C5

## 2021-10-06 NOTE — OCCUPATIONAL THERAPY INITIAL EVALUATION ADULT - GROSSLY INTACT, SENSORY
Pt reporting 100% sensation on R side, 70% sensation on L side including face, LUE, and LLE/Grossly Intact

## 2021-10-06 NOTE — PHYSICAL THERAPY INITIAL EVALUATION ADULT - WORK/LEISURE ACTIVITY, REHAB EVAL
I have generated the referral.
Left message to notify patient referral has been faxed to [facility] and they will contact her to schedule
Pt would like to be referred to a sleep splst.  Pt believes when she's sleeping she stops breathing. Pt called to inform the doctor she did get the shingles shot last Saturday.
independent

## 2021-10-06 NOTE — OCCUPATIONAL THERAPY INITIAL EVALUATION ADULT - PHYSICAL ASSIST/NONPHYSICAL ASSIST:TOILET, OT EVAL
After Visit Summary   7/7/2017    Chaitanya Ovalles    MRN: [id number]           Patient Information     Date Of Birth          1968        Visit Information        Provider Department      7/7/2017 1:30 PM Nadine Mena MD [facility]        Today's Diagnoses     Urinary frequency    -  1    Nocturia        Elevated prostate specific antigen (PSA)           Follow-ups after your visit        Additional Services     PAC Visit Referral (For [facility] Only)       Does this visit require an Anesthesia consult?  No -  If this visit is not for evaluation for co-morbidity (such as patient request due to anxiety), what is the purpose of the visit?: H and P     H&P done by:  N/A      Please be aware that coverage of these services is subject to the terms and limitations of your health insurance plan.  Call member services at your health plan with any benefit or coverage questions.      Please bring the following to your appointment:  >>   Any x-rays, CTs or MRIs which have been performed.  Contact the facility where they were done to arrange for  prior to your scheduled appointment.  Any new CT, MRI or other procedures ordered by your specialist must be performed at a [place] facility or coordinated by your clinic's referral office.    >>   List of current medications  >>   This referral request   >>   Any documents/labs given to you for this referral                  Your next 10 appointments already scheduled     Sep 07, 2017  2:30 PM CDT   (Arrive by 2:15 PM)   PAC EVALUATION with Uc Pac Marino 7   [facility] ([facility] [place])    [address] [place] [address]  4th [facility] [id number] [phone]            Sep 07, 2017  3:30 PM CDT   (Arrive by 3:15 PM)   PAC RN ASSESSMENT with Herb Pac Rn   [facility] ([facility] [place])    [address] [place] [address]  [address] [address]   [phone]            Sep 07, 2017  4:00 PM CDT   (Arrive by 3:45 PM)   PAC Anesthesia Consult with  Pac Anesthesiologist   [facility] ([facility] [place])    [address] [place] [id number] [phone]            Sep 18, 2017   Procedure with Nadine Mena MD   [facility] [place], Same Day Surgery (--)    [address] [facility] [address] [phone]            Oct 16, 2017  2:15 PM CDT   (Arrive by 2:00 PM)   Post-Op with Nadine Mena MD   [facility] and [facility]state and Urologic Cancers ([facility] [place])    [address] [place] [id number] [phone]              Who to contact     Please call your clinic at [phone] to:    Ask questions about your health    Make or cancel appointments    Discuss your medicines    Learn about your test results    Speak to your doctor   If you have compliments or concerns about an experience at your clinic, or if you wish to file a complaint, please contact [facility] Patient Relations at [phone] or email us at [email or web address]         Additional Information About Your Visit        [employer] Information     [employer]t gives you secure access to your electronic health record. If you see a primary care provider, you can also send messages to your care team and make appointments. If you have questions, please call your primary care clinic.  If you do not have a primary care provider, please call [phone] and they will assist you.      [employer] is an electronic gateway that provides easy, online access to your medical records. With [employer], you can request a clinic appointment, read your test results, renew a prescription or communicate with your care team.     To access your existing account, please contact your [facility]  "[place] [facility] or call [phone] for assistance.        Care EveryWhere ID     This is your Care EveryWhere ID. This could be used by other organizations to access your [place] medical records  [id number]        Your Vitals Were     Pulse Height BMI (Body Mass Index)             60 1.727 m (5' 8\") 28.21 kg/m2          Blood Pressure from Last 3 Encounters:   07/07/17 134/79   06/28/17 152/79   05/19/17 132/88    Weight from Last 3 Encounters:   07/07/17 84.1 kg (185 lb 8 oz)   06/28/17 80.7 kg (178 lb)   05/19/17 84.8 kg (187 lb)              We Performed the Following     PAC Visit Referral (For [facility] Only)     Valeri-Operative Worksheet  (Urology General)        Primary Care Provider Office Phone # Fax #    Larry Fawad Mcdaniel [phone]       [facility] [address]  [facility] [address]        Equal Access to Services     [facility]: Hadii aad ku hadasho Soomaali, waaxda luqadaha, qaybta kaalmada adeegyada, waxay idiin hayaan erlinda byers . So [facility] [phone].    ATENCIÓN: Si habla español, tiene a pimentel disposición servicios gratuitos de asistencia lingüística. [facility] [phone].    We comply with applicable federal civil rights laws and [place] laws. We do not discriminate on the basis of race, color, national origin, age, disability sex, sexual orientation or gender identity.            Thank you!     Thank you for choosing [facility] AND [facility] FOR PROSTATE AND UROLOGIC CANCERS  for your care. Our goal is always to provide you with excellent care. Hearing back from our patients is one way we can continue to improve our services. Please take a few minutes to complete the written survey that you may receive in the mail after your visit with us. Thank you!             Your Updated Medication List - Protect others around you: Learn how to safely use, store and throw away your medicines at www.disposemymeds.org.      Notice  As of 7/7/2017  " 5:45 PM    You have not been prescribed any medications.       verbal cues/nonverbal cues (demo/gestures)/1 person assist

## 2021-10-06 NOTE — PHYSICAL THERAPY INITIAL EVALUATION ADULT - ADDITIONAL COMMENTS
Patient lives alone in elevator accessible apartment. Reports has neighbors available with assist with ADLs. Does not use any Assistive Devices baseline.

## 2021-10-06 NOTE — OCCUPATIONAL THERAPY INITIAL EVALUATION ADULT - MODIFIED CLINICAL TEST OF SENSORY INTEGRATION IN BALANCE TEST
Pt able to ambulate ~100'x2 with RW and CGA, frequent reminder to keep B hands on RW and to stay within RW.

## 2021-10-06 NOTE — PHYSICAL THERAPY INITIAL EVALUATION ADULT - PERTINENT HX OF CURRENT PROBLEM, REHAB EVAL
56yFemale with SLE, RA, NEVIN, Fibromyalgia who present with 9/10 neck pain that has progressed in severity over the past week. She started experiencing numbness/tingling in the LUE a few days ago. She has tried NSAIDs with no relief of pain. Patient denies recent trauma or surgery, changes in vision or gait, saddle anesthesia, urinary/fecal incontinence.

## 2021-10-06 NOTE — PHYSICAL THERAPY INITIAL EVALUATION ADULT - GENERAL OBSERVATIONS, REHAB EVAL
Patient received semi-archibald in bed  in NAD on RA, +SCDs, +Heplock. Cleared by DARWIN Stover. Agreeable to PT.

## 2021-10-06 NOTE — PROGRESS NOTE ADULT - SUBJECTIVE AND OBJECTIVE BOX
Ortho Note    Pt comfortable without complaints, pain controlled  Denies CP, SOB, N/V, numbness/tingling     Vital Signs Last 24 Hrs  T(C): --  T(F): --  HR: 78 (10-06-21 @ 08:48) (74 - 78)  BP: --  BP(mean): --  RR: 18 (10-06-21 @ 05:55) (18 - 18)  SpO2: 100% (10-06-21 @ 08:48) (100% - 100%)  AVSS    General: Pt Alert and oriented, NAD  DSG C/D/I; HV x1 intact (removed on exam- low output)  Pulses: +2RP, WWP fingers  Sensation: SILT BLE  Motor: 5/5 EHL/FHL/TA/GS                          10.4   9.70  )-----------( 242      ( 06 Oct 2021 08:23 )             33.2     10-06    140  |  105  |  11  ----------------------------<  91  4.1   |  28  |  0.59    Ca    9.5      06 Oct 2021 08:23        A/P: 56yFemale POD#1 s/p C4-C6 ACDF  - Stable  - Pain Control  - DVT ppx: SCDs  - PT, WBS: WBAT; OT consult  - OOB for meals, I/S  - bowel regimen  - HV x 1 removed; continue hard cervical collar  - dispo: home today      Ortho Pager [phone]

## 2021-10-06 NOTE — PROGRESS NOTE ADULT - ATTENDING COMMENTS
Judit Gamez is a 56 year old female s/p ACDF C4-C5.  Her radicular pain has resolved and she reports improved strength in her left upper extremity. She is neurologically intact.  Remove hemovac.  PT/OT today.  Standing cervical radiographs.  Cervical orthosis at all times with exception of feeding and cleaning.  Anticipate discharge home today.  Followup in office in 2 weeks.

## 2021-10-06 NOTE — PROGRESS NOTE ADULT - SUBJECTIVE AND OBJECTIVE BOX
Interval Events: Reviewed  Patient seen and examined at bedside.    Patient is a 56y old  Female who presents with a chief complaint of severe neck and shoulder pain (06 Oct 2021 11:38)    she is having pain in the neck  PAST MEDICAL & SURGICAL HISTORY:  Lupus    Rheumatoid arthritis    Fibromyalgia        MEDICATIONS:  Pulmonary:    Antimicrobials:  hydroxychloroquine 200 milliGRAM(s) Oral daily    Anticoagulants:    Cardiac:      Allergies    penicillins (Hives)  shellfish (Unknown)    Intolerances        Vital Signs Last 24 Hrs  T(C): 36.7 (06 Oct 2021 04:26), Max: 36.8 (06 Oct 2021 00:36)  T(F): 98.1 (06 Oct 2021 04:26), Max: 98.3 (06 Oct 2021 00:36)  HR: 78 (06 Oct 2021 08:48) (62 - 86)  BP: 147/73 (06 Oct 2021 04:26) (113/77 - 153/106)  BP(mean): 96 (05 Oct 2021 14:13) (91 - 122)  RR: 18 (06 Oct 2021 05:55) (16 - 39)  SpO2: 100% (06 Oct 2021 08:48) (91% - 100%)    10-05 @ 07:01  -  10-06 @ 07:00  --------------------------------------------------------  IN: 1300 mL / OUT: 1905 mL / NET: -605 mL    10-06 @ 07:01  -  10-06 @ 12:08  --------------------------------------------------------  IN: 0 mL / OUT: 400 mL / NET: -400 mL          Review of Systems:   •	General: negative  •	Skin/Breast: negative  •	Ophthalmologic: negative  •	ENMT: negative  •	Respiratory and Thorax: negative  •	Cardiovascular: negative  •	Gastrointestinal: negative  •	Genitourinary: negative  •	Musculoskeletal: negative  •	Neurological: negative  •	Psychiatric: negative  •	Hematology/Lymphatics: negative  •	Endocrine: negative  •	Allergic/Immunologic: negative    Physical Exam:   • Constitutional:	refer to the dietion /Nutritionist note  • Eyes:	EOMI; PERRL; no drainage or redness  • ENMT:	No oral lesions; no gross abnormalities  • Neck	No bruits; no thyromegaly or nodules  • Breasts:	not examined  • Back:	No deformity or limitation of movement  • Respiratory:	Breath Sounds equal & clear to percussion & auscultation, no accessory muscle use  • Cardiovascular:	Regular rate & rhythm, normal S1, S2; no murmurs, gallops or rubs; no S3, S4  • Gastrointestinal:	Soft, non-tender, no hepatosplenomegaly, normal bowel sounds  • Genitourinary:	not examined  • Rectal: not examined  • Extremities:	No cyanosis, clubbing or edema  • Vascular:	Equal and normal pulses (carotid, femoral, dorsalis pedis)  • Neurologica:l	not examined  • Skin:	No lesions; no rash  • Lymph Nodes:	No lymphadedenopathy  • Musculoskeletal:	No joint pain, swelling or deformity; no limitation of movement        LABS:      CBC Full  -  ( 06 Oct 2021 08:23 )  WBC Count : 9.70 K/uL  RBC Count : 3.46 M/uL  Hemoglobin : 10.4 g/dL  Hematocrit : 33.2 %  Platelet Count - Automated : 242 K/uL  Mean Cell Volume : 96.0 fl  Mean Cell Hemoglobin : 30.1 pg  Mean Cell Hemoglobin Concentration : 31.3 gm/dL  Auto Neutrophil # : 6.67 K/uL  Auto Lymphocyte # : 2.01 K/uL  Auto Monocyte # : 0.92 K/uL  Auto Eosinophil # : 0.01 K/uL  Auto Basophil # : 0.03 K/uL  Auto Neutrophil % : 68.8 %  Auto Lymphocyte % : 20.7 %  Auto Monocyte % : 9.5 %  Auto Eosinophil % : 0.1 %  Auto Basophil % : 0.3 %    10-06    140  |  105  |  11  ----------------------------<  91  4.1   |  28  |  0.59    Ca    9.5      06 Oct 2021 08:23                          RADIOLOGY & ADDITIONAL STUDIES (The following images were personally reviewed):

## 2021-10-06 NOTE — OCCUPATIONAL THERAPY INITIAL EVALUATION ADULT - GENERAL OBSERVATIONS, REHAB EVAL
Pt received semi-supine in bed, with PT Victor Manuel at bedside, +tele, +heplock, +miami J collar, in NAD and agreeable to OT.

## 2021-10-06 NOTE — OCCUPATIONAL THERAPY INITIAL EVALUATION ADULT - PHYSICAL ASSIST/NONPHYSICAL ASSIST:DRESS UPPER BODY, OT EVAL
FOCUS/GOAL  Bladder management, Medical management, and Skin integrity    ASSESSMENT, INTERVENTIONS AND CONTINUING PLAN FOR GOAL:  Orientation: alert to self and situation, disoriented to time, able to state she's in a hospital but doesn't know the name.  Bowel: continent of small BM in the toilet.  Bladder: Incontinent x2, PVR 29 and 35  Pain: C/o right hip pain, received tylenol x1 and Voltaren gel applied with relief.  Ambulation/Transfers: assist 1/GB/ walker  Blood sugars: 102 and 138, no insulin given.  Diet/ Liquids: Regular/thin/takes pills whole without difficulty.  Tubes/ Lines/ Drains: no lines.  Vitals were reviewed  Temp: 97.5  F (36.4  C) Temp src: Oral BP: 135/72 Pulse: 74   Resp: 16 SpO2: 97 % O2 Device: None (Room air)    Hydralazine PRN given x1 for elevated BP recheck at /72.  Oxygen: on room air oxygen sats stable  Skin: intact.  Other: Pt was intermittently tearful this evening,states she wants to go home and take care of her mom, emotional support provided.   Alarms set for safety and call light light within reach.   verbal cues/nonverbal cues (demo/gestures)/1 person assist

## 2021-10-06 NOTE — OCCUPATIONAL THERAPY INITIAL EVALUATION ADULT - DIAGNOSIS, OT EVAL
Pt presents with impaired balance, generalized weakness, and decreased safety awareness impacting overall ADL performance

## 2021-10-06 NOTE — OCCUPATIONAL THERAPY INITIAL EVALUATION ADULT - MODALITIES TREATMENT COMMENTS
Pt requires frequent reminders to maintain cervical precautions, primarily twisting. Pt also with involuntary jerky movement when swallowing however inconsistent at times, ortho team aware.

## 2021-10-06 NOTE — OCCUPATIONAL THERAPY INITIAL EVALUATION ADULT - ADDITIONAL COMMENTS
Pt lives alone in an apartment, +tub/shower combination. Prior to admit, pt states she was independent in all ADLs/IADLs prior and independent with mobility using no AD. Pt denies hx of recent falls.

## 2021-10-06 NOTE — PROVIDER CONTACT NOTE (OTHER) - ASSESSMENT
Pt complained of generalized chest pain and neck pain. Pain level 10/10. /67 HR 71 RR 18 O2 sat 97% in room air.

## 2021-10-07 ENCOUNTER — TRANSCRIPTION ENCOUNTER (OUTPATIENT)
Age: 56
End: 2021-10-07

## 2021-10-07 VITALS
RESPIRATION RATE: 16 BRPM | SYSTOLIC BLOOD PRESSURE: 115 MMHG | DIASTOLIC BLOOD PRESSURE: 68 MMHG | OXYGEN SATURATION: 96 % | HEART RATE: 78 BPM | TEMPERATURE: 98 F

## 2021-10-07 PROBLEM — M06.9 RHEUMATOID ARTHRITIS, UNSPECIFIED: Chronic | Status: ACTIVE | Noted: 2021-10-03

## 2021-10-07 PROBLEM — M79.7 FIBROMYALGIA: Chronic | Status: ACTIVE | Noted: 2021-10-03

## 2021-10-07 LAB
ANION GAP SERPL CALC-SCNC: 8 MMOL/L — SIGNIFICANT CHANGE UP (ref 5–17)
BASOPHILS # BLD AUTO: 0.02 K/UL — SIGNIFICANT CHANGE UP (ref 0–0.2)
BASOPHILS NFR BLD AUTO: 0.2 % — SIGNIFICANT CHANGE UP (ref 0–2)
BUN SERPL-MCNC: 10 MG/DL — SIGNIFICANT CHANGE UP (ref 7–23)
CALCIUM SERPL-MCNC: 9.1 MG/DL — SIGNIFICANT CHANGE UP (ref 8.4–10.5)
CHLORIDE SERPL-SCNC: 104 MMOL/L — SIGNIFICANT CHANGE UP (ref 96–108)
CO2 SERPL-SCNC: 27 MMOL/L — SIGNIFICANT CHANGE UP (ref 22–31)
CREAT SERPL-MCNC: 0.61 MG/DL — SIGNIFICANT CHANGE UP (ref 0.5–1.3)
EOSINOPHIL # BLD AUTO: 0.02 K/UL — SIGNIFICANT CHANGE UP (ref 0–0.5)
EOSINOPHIL NFR BLD AUTO: 0.2 % — SIGNIFICANT CHANGE UP (ref 0–6)
GLUCOSE SERPL-MCNC: 105 MG/DL — HIGH (ref 70–99)
HCT VFR BLD CALC: 33.5 % — LOW (ref 34.5–45)
HGB BLD-MCNC: 10.6 G/DL — LOW (ref 11.5–15.5)
IMM GRANULOCYTES NFR BLD AUTO: 0.3 % — SIGNIFICANT CHANGE UP (ref 0–1.5)
LYMPHOCYTES # BLD AUTO: 1.41 K/UL — SIGNIFICANT CHANGE UP (ref 1–3.3)
LYMPHOCYTES # BLD AUTO: 14.9 % — SIGNIFICANT CHANGE UP (ref 13–44)
MCHC RBC-ENTMCNC: 30.4 PG — SIGNIFICANT CHANGE UP (ref 27–34)
MCHC RBC-ENTMCNC: 31.6 GM/DL — LOW (ref 32–36)
MCV RBC AUTO: 96 FL — SIGNIFICANT CHANGE UP (ref 80–100)
MONOCYTES # BLD AUTO: 0.75 K/UL — SIGNIFICANT CHANGE UP (ref 0–0.9)
MONOCYTES NFR BLD AUTO: 7.9 % — SIGNIFICANT CHANGE UP (ref 2–14)
NEUTROPHILS # BLD AUTO: 7.22 K/UL — SIGNIFICANT CHANGE UP (ref 1.8–7.4)
NEUTROPHILS NFR BLD AUTO: 76.5 % — SIGNIFICANT CHANGE UP (ref 43–77)
NRBC # BLD: 0 /100 WBCS — SIGNIFICANT CHANGE UP (ref 0–0)
PLATELET # BLD AUTO: 216 K/UL — SIGNIFICANT CHANGE UP (ref 150–400)
POTASSIUM SERPL-MCNC: 3.9 MMOL/L — SIGNIFICANT CHANGE UP (ref 3.5–5.3)
POTASSIUM SERPL-SCNC: 3.9 MMOL/L — SIGNIFICANT CHANGE UP (ref 3.5–5.3)
RBC # BLD: 3.49 M/UL — LOW (ref 3.8–5.2)
RBC # FLD: 12.3 % — SIGNIFICANT CHANGE UP (ref 10.3–14.5)
SODIUM SERPL-SCNC: 139 MMOL/L — SIGNIFICANT CHANGE UP (ref 135–145)
WBC # BLD: 9.45 K/UL — SIGNIFICANT CHANGE UP (ref 3.8–10.5)
WBC # FLD AUTO: 9.45 K/UL — SIGNIFICANT CHANGE UP (ref 3.8–10.5)

## 2021-10-07 PROCEDURE — 99232 SBSQ HOSP IP/OBS MODERATE 35: CPT | Mod: GC

## 2021-10-07 RX ORDER — OXYCODONE HYDROCHLORIDE 5 MG/1
1 TABLET ORAL
Qty: 0 | Refills: 0 | DISCHARGE
Start: 2021-10-07

## 2021-10-07 RX ORDER — OXYCODONE HYDROCHLORIDE 5 MG/1
1 TABLET ORAL
Qty: 30 | Refills: 0
Start: 2021-10-07 | End: 2021-10-11

## 2021-10-07 RX ORDER — METHOCARBAMOL 500 MG/1
1 TABLET, FILM COATED ORAL
Qty: 0 | Refills: 0 | DISCHARGE
Start: 2021-10-07

## 2021-10-07 RX ORDER — METHOCARBAMOL 500 MG/1
1 TABLET, FILM COATED ORAL
Qty: 21 | Refills: 0
Start: 2021-10-07 | End: 2021-10-13

## 2021-10-07 RX ADMIN — Medication 200 MILLIGRAM(S): at 13:25

## 2021-10-07 RX ADMIN — Medication 1000 MILLIGRAM(S): at 06:00

## 2021-10-07 RX ADMIN — METHOCARBAMOL 500 MILLIGRAM(S): 500 TABLET, FILM COATED ORAL at 05:06

## 2021-10-07 RX ADMIN — Medication 1000 MILLIGRAM(S): at 13:25

## 2021-10-07 RX ADMIN — Medication 5 MILLIGRAM(S): at 05:07

## 2021-10-07 RX ADMIN — Medication 1000 MILLIGRAM(S): at 05:06

## 2021-10-07 RX ADMIN — Medication 50 MILLIGRAM(S): at 05:06

## 2021-10-07 RX ADMIN — METHOCARBAMOL 500 MILLIGRAM(S): 500 TABLET, FILM COATED ORAL at 13:26

## 2021-10-07 RX ADMIN — Medication 1000 MILLIGRAM(S): at 14:00

## 2021-10-07 RX ADMIN — Medication 50 MILLIGRAM(S): at 13:25

## 2021-10-07 RX ADMIN — PANTOPRAZOLE SODIUM 40 MILLIGRAM(S): 20 TABLET, DELAYED RELEASE ORAL at 05:06

## 2021-10-07 RX ADMIN — OXYCODONE HYDROCHLORIDE 10 MILLIGRAM(S): 5 TABLET ORAL at 05:06

## 2021-10-07 RX ADMIN — OXYCODONE HYDROCHLORIDE 10 MILLIGRAM(S): 5 TABLET ORAL at 06:00

## 2021-10-07 NOTE — PROGRESS NOTE ADULT - ATTENDING COMMENTS
Judit Gamez is a 56 year old female s/p ACDF C4-C5.  Her radicular pain has resolved and she reports improved strength in her left upper extremity. She is neurologically intact. Radiographs reviewed. Pain well controlled.  Continue PT.  Anticipate discharge home today.

## 2021-10-07 NOTE — PROGRESS NOTE ADULT - SUBJECTIVE AND OBJECTIVE BOX
Ortho Note    Subjective:  Pt comfortable without complaints, pain controlled with current pain medication regimen.   Denies CP, SOB, N/V, numbness/tingling     Vital Signs Last 24 Hrs  T(C): 36.7 (10-07-21 @ 09:30), Max: 36.7 (10-07-21 @ 09:30)  T(F): 98 (10-07-21 @ 09:30), Max: 98 (10-07-21 @ 09:30)  HR: 78 (10-07-21 @ 09:30) (78 - 78)  BP: 115/68 (10-07-21 @ 09:30) (115/68 - 115/68)  BP(mean): --  RR: 16 (10-07-21 @ 09:30) (16 - 16)  SpO2: 96% (10-07-21 @ 09:30) (96% - 96%)  AVSS    Objective:    Physical Exam:  General: Pt Alert and oriented, NAD   cervical DSG guaze and tegaderm C/D/I  Pulses: +2 pedal pulses, wwp toes, cap refill less tahn 3 seconds  Sensation: silt intact bilateral upper extremities  Motor: biceps, triceps, deltoids- firing        Plan of Care:  A/P: 56yFemale POD#2 s/p ACDF C4-C5  - afebrile, nontoxic apperance  - Pain Control- tylenol 1000mg PO Q8h, methocarbamol 500mg Po Q8h, lyrica 50mg PO TID, oxycodone 5-10mg PO Q4h prn moderate to severe pain   - DVT ppx: scds  - PT, WBS: WBAT  - bowel regimen, IS use  - DIspo- home pending pt clearance    Ortho Pager [phone]

## 2021-10-07 NOTE — PROGRESS NOTE ADULT - TIME BILLING
Patient seen and examined with house-staff during bedside rounds.  Resident note read, including vitals, physical findings, laboratory data, and radiological reports.   Revisions included below.  Direct personal management at bed side and extensive interpretation of the data.  Plan was outlined and discussed in details with the housestaff.  Decision making of high complexity  Action taken for acute disease activity to reflect the level of care provided:  - medication reconciliation  - review laboratory data    Patient is clinically stable.  She tolerated them p.o.  The patient reports resting in physical therapy
Patient seen and examined with house-staff during bedside rounds.  Resident note read, including vitals, physical findings, laboratory data, and radiological reports.   Revisions included below.  Direct personal management at bed side and extensive interpretation of the data.  Plan was outlined and discussed in details with the housestaff.  Decision making of high complexity  Action taken for acute disease activity to reflect the level of care provided:  - medication reconciliation  - review laboratory data

## 2021-10-07 NOTE — PROGRESS NOTE ADULT - PROBLEM SELECTOR PLAN 7
The patient is hemodynamically stable.  The pain is controlled.  Patient is on DVT prophylaxis.  Patient is using incentive spirometry.  Oxygen saturation is acceptable.  Advance diet as tolerated.  Advance activity as tolerated.  Monitor for ileus.  Patient is on Laxatives.  Surgical wound is stable.  No indication for monitor bed.  No airway problem nor dysphagia
The patient is hemodynamically stable.  The pain is controlled.  Patient is on DVT prophylaxis.  Patient is using incentive spirometry.  Oxygen saturation is acceptable.  Advance diet as tolerated.  Advance activity as tolerated.  Monitor for ileus.  Patient is on Laxatives.  Surgical wound is stable.  No indication for monitor bed.  No airway problem nor dysphagia

## 2021-10-07 NOTE — PROGRESS NOTE ADULT - PROBLEM SELECTOR PLAN 4
most likely related to her CTD as seen in the last PFT
most likely related to her CTD as seen in the last PFT

## 2021-10-07 NOTE — DISCHARGE NOTE NURSING/CASE MANAGEMENT/SOCIAL WORK - PATIENT PORTAL LINK FT
You can access the FollowMyHealth Patient Portal offered by [facility] by registering at the following website: [email or web address]. By joining [employer]’s FollowMyHealth portal, you will also be able to view your health information using other applications (apps) compatible with our system.

## 2021-10-07 NOTE — PROGRESS NOTE ADULT - PROBLEM SELECTOR PLAN 1
she is stable and continue on Prednisone.  No need for stress steroids
she is stable and continue on Prednisone.  No need for stress steroids

## 2021-10-07 NOTE — PROGRESS NOTE ADULT - REASON FOR ADMISSION
severe neck and shoulder pain

## 2021-10-07 NOTE — PROGRESS NOTE ADULT - PROBLEM SELECTOR PLAN 6
she slept with the cpap and avoid oversedation.  Continue monitor
she slept with the cpap and avoid oversedation.  Continue monitor

## 2021-10-07 NOTE — PROGRESS NOTE ADULT - SUBJECTIVE AND OBJECTIVE BOX
Interval Events: Reviewed  Patient seen and examined at bedside.    Patient is a 56y old  Female who presents with a chief complaint of severe neck and shoulder pain (07 Oct 2021 12:38)    she is doing well  PAST MEDICAL & SURGICAL HISTORY:  Lupus    Rheumatoid arthritis    Fibromyalgia        MEDICATIONS:  Pulmonary:    Antimicrobials:  hydroxychloroquine 200 milliGRAM(s) Oral daily    Anticoagulants:    Cardiac:      Allergies    penicillins (Hives)  shellfish (Unknown)    Intolerances        Vital Signs Last 24 Hrs  T(C): 36.7 (07 Oct 2021 09:30), Max: 36.7 (07 Oct 2021 05:02)  T(F): 98 (07 Oct 2021 09:30), Max: 98.1 (07 Oct 2021 05:02)  HR: 78 (07 Oct 2021 09:30) (73 - 78)  BP: 115/68 (07 Oct 2021 09:30) (115/68 - 128/85)  BP(mean): --  RR: 16 (07 Oct 2021 09:30) (16 - 18)  SpO2: 96% (07 Oct 2021 09:30) (96% - 100%)    10-06 @ 07:01  -  10-07 @ 07:00  --------------------------------------------------------  IN: 0 mL / OUT: 1305 mL / NET: -1305 mL          Review of Systems:   •	General: negative  •	Skin/Breast: negative  •	Ophthalmologic: negative  •	ENMT: negative  •	Respiratory and Thorax: negative  •	Cardiovascular: negative  •	Gastrointestinal: negative  •	Genitourinary: negative  •	Musculoskeletal: negative  •	Neurological: negative  •	Psychiatric: negative  •	Hematology/Lymphatics: negative  •	Endocrine: negative  •	Allergic/Immunologic: negative    Physical Exam:   • Constitutional:	refer to the dietion /Nutritionist note  • Eyes:	EOMI; PERRL; no drainage or redness  • ENMT:	No oral lesions; no gross abnormalities  • Neck	No bruits; no thyromegaly or nodules  • Breasts:	not examined  • Back:	No deformity or limitation of movement  • Respiratory:	Breath Sounds equal & clear to percussion & auscultation, no accessory muscle use  • Cardiovascular:	Regular rate & rhythm, normal S1, S2; no murmurs, gallops or rubs; no S3, S4  • Gastrointestinal:	Soft, non-tender, no hepatosplenomegaly, normal bowel sounds  • Genitourinary:	not examined  • Rectal: not examined  • Extremities:	No cyanosis, clubbing or edema  • Vascular:	Equal and normal pulses (carotid, femoral, dorsalis pedis)  • Neurologica:l	not examined  • Skin:	No lesions; no rash  • Lymph Nodes:	No lymphadedenopathy  • Musculoskeletal:	No joint pain, swelling or deformity; no limitation of movement        LABS:      CBC Full  -  ( 07 Oct 2021 10:25 )  WBC Count : 9.45 K/uL  RBC Count : 3.49 M/uL  Hemoglobin : 10.6 g/dL  Hematocrit : 33.5 %  Platelet Count - Automated : 216 K/uL  Mean Cell Volume : 96.0 fl  Mean Cell Hemoglobin : 30.4 pg  Mean Cell Hemoglobin Concentration : 31.6 gm/dL  Auto Neutrophil # : 7.22 K/uL  Auto Lymphocyte # : 1.41 K/uL  Auto Monocyte # : 0.75 K/uL  Auto Eosinophil # : 0.02 K/uL  Auto Basophil # : 0.02 K/uL  Auto Neutrophil % : 76.5 %  Auto Lymphocyte % : 14.9 %  Auto Monocyte % : 7.9 %  Auto Eosinophil % : 0.2 %  Auto Basophil % : 0.2 %    10-07    139  |  104  |  10  ----------------------------<  105<H>  3.9   |  27  |  0.61    Ca    9.1      07 Oct 2021 10:25                          RADIOLOGY & ADDITIONAL STUDIES (The following images were personally reviewed):

## 2021-10-07 NOTE — PROGRESS NOTE ADULT - SUBJECTIVE AND OBJECTIVE BOX
Ortho Progress Note    Subjective:  Pain controlled with medication.  Denies CP, SOB, N/V, numbness/tingling     Objective:    Vital Signs Last 24 Hrs  T(C): 36.7 (10-07-21 @ 05:02), Max: 36.7 (10-07-21 @ 05:02)  T(F): 98.1 (10-07-21 @ 05:02), Max: 98.1 (10-07-21 @ 05:02)  HR: 77 (10-07-21 @ 05:08) (76 - 77)  BP: 128/85 (10-07-21 @ 05:02) (128/85 - 128/85)  BP(mean): --  RR: 16 (10-07-21 @ 05:08) (16 - 18)  SpO2: 97% (10-07-21 @ 05:08) (97% - 100%)  AVSS    Physical Exam:  General: NAD, resting comfortably in bed  Dressing C/D/I, McKenzie J in place  General: Pt Alert and oriented, NAD  DSG C/D/I; HV x1 intact (removed on exam- low output)  Pulses: +2RP, WWP fingers  Sensation: SILT BLE  Motor: 5/5 EHL/FHL/TA/GS      A/P: 56yFemale s/p C4-C6 ACDF, drain removed 10/6.  - Stable  - Pain Control  - DVT ppx: SCDs  - PT, WBS: WBAT  - Dispo: Home today after PT    Ortho Pager [phone]

## 2021-10-08 ENCOUNTER — EMERGENCY (EMERGENCY)
Facility: HOSPITAL | Age: 56
LOS: 1 days | Discharge: HOME | End: 2021-10-08
Attending: EMERGENCY MEDICINE | Admitting: EMERGENCY MEDICINE
Payer: COMMERCIAL

## 2021-10-08 VITALS
RESPIRATION RATE: 20 BRPM | HEIGHT: 63 IN | SYSTOLIC BLOOD PRESSURE: 120 MMHG | TEMPERATURE: 98 F | WEIGHT: 125 LBS | DIASTOLIC BLOOD PRESSURE: 77 MMHG | OXYGEN SATURATION: 100 % | HEART RATE: 102 BPM

## 2021-10-08 DIAGNOSIS — G89.18 OTHER ACUTE POSTPROCEDURAL PAIN: ICD-10-CM

## 2021-10-08 DIAGNOSIS — M54.2 CERVICALGIA: ICD-10-CM

## 2021-10-08 DIAGNOSIS — Z91.013 ALLERGY TO SEAFOOD: ICD-10-CM

## 2021-10-08 DIAGNOSIS — M06.9 RHEUMATOID ARTHRITIS, UNSPECIFIED: ICD-10-CM

## 2021-10-08 DIAGNOSIS — Z88.0 ALLERGY STATUS TO PENICILLIN: ICD-10-CM

## 2021-10-08 DIAGNOSIS — M79.7 FIBROMYALGIA: ICD-10-CM

## 2021-10-08 DIAGNOSIS — Z88.6 ALLERGY STATUS TO ANALGESIC AGENT: ICD-10-CM

## 2021-10-08 DIAGNOSIS — M32.9 SYSTEMIC LUPUS ERYTHEMATOSUS, UNSPECIFIED: ICD-10-CM

## 2021-10-08 LAB
BASOPHILS # BLD AUTO: 0.04 K/UL — SIGNIFICANT CHANGE UP (ref 0–0.2)
BASOPHILS NFR BLD AUTO: 0.5 % — SIGNIFICANT CHANGE UP (ref 0–2)
EOSINOPHIL # BLD AUTO: 0.08 K/UL — SIGNIFICANT CHANGE UP (ref 0–0.5)
EOSINOPHIL NFR BLD AUTO: 1 % — SIGNIFICANT CHANGE UP (ref 0–6)
HCT VFR BLD CALC: 32.6 % — LOW (ref 34.5–45)
HGB BLD-MCNC: 10.6 G/DL — LOW (ref 11.5–15.5)
IMM GRANULOCYTES NFR BLD AUTO: 0.2 % — SIGNIFICANT CHANGE UP (ref 0–1.5)
LYMPHOCYTES # BLD AUTO: 2.04 K/UL — SIGNIFICANT CHANGE UP (ref 1–3.3)
LYMPHOCYTES # BLD AUTO: 24.5 % — SIGNIFICANT CHANGE UP (ref 13–44)
MCHC RBC-ENTMCNC: 30.5 PG — SIGNIFICANT CHANGE UP (ref 27–34)
MCHC RBC-ENTMCNC: 32.5 GM/DL — SIGNIFICANT CHANGE UP (ref 32–36)
MCV RBC AUTO: 93.9 FL — SIGNIFICANT CHANGE UP (ref 80–100)
MONOCYTES # BLD AUTO: 1.08 K/UL — HIGH (ref 0–0.9)
MONOCYTES NFR BLD AUTO: 13 % — SIGNIFICANT CHANGE UP (ref 2–14)
NEUTROPHILS # BLD AUTO: 5.06 K/UL — SIGNIFICANT CHANGE UP (ref 1.8–7.4)
NEUTROPHILS NFR BLD AUTO: 60.8 % — SIGNIFICANT CHANGE UP (ref 43–77)
NRBC # BLD: 0 /100 WBCS — SIGNIFICANT CHANGE UP (ref 0–0)
PLATELET # BLD AUTO: 241 K/UL — SIGNIFICANT CHANGE UP (ref 150–400)
RBC # BLD: 3.47 M/UL — LOW (ref 3.8–5.2)
RBC # FLD: 12.3 % — SIGNIFICANT CHANGE UP (ref 10.3–14.5)
WBC # BLD: 8.32 K/UL — SIGNIFICANT CHANGE UP (ref 3.8–10.5)
WBC # FLD AUTO: 8.32 K/UL — SIGNIFICANT CHANGE UP (ref 3.8–10.5)

## 2021-10-08 PROCEDURE — 99285 EMERGENCY DEPT VISIT HI MDM: CPT

## 2021-10-08 RX ORDER — ONDANSETRON 8 MG/1
4 TABLET, FILM COATED ORAL ONCE
Refills: 0 | Status: COMPLETED | OUTPATIENT
Start: 2021-10-08 | End: 2021-10-08

## 2021-10-08 RX ORDER — MORPHINE SULFATE 50 MG/1
2 CAPSULE, EXTENDED RELEASE ORAL ONCE
Refills: 0 | Status: DISCONTINUED | OUTPATIENT
Start: 2021-10-08 | End: 2021-10-08

## 2021-10-08 RX ORDER — SODIUM CHLORIDE 9 MG/ML
1000 INJECTION INTRAMUSCULAR; INTRAVENOUS; SUBCUTANEOUS ONCE
Refills: 0 | Status: COMPLETED | OUTPATIENT
Start: 2021-10-08 | End: 2021-10-08

## 2021-10-08 RX ORDER — DEXAMETHASONE 0.5 MG/5ML
6 ELIXIR ORAL ONCE
Refills: 0 | Status: COMPLETED | OUTPATIENT
Start: 2021-10-08 | End: 2021-10-08

## 2021-10-08 RX ADMIN — SODIUM CHLORIDE 1000 MILLILITER(S): 9 INJECTION INTRAMUSCULAR; INTRAVENOUS; SUBCUTANEOUS at 23:50

## 2021-10-08 RX ADMIN — MORPHINE SULFATE 2 MILLIGRAM(S): 50 CAPSULE, EXTENDED RELEASE ORAL at 23:49

## 2021-10-08 RX ADMIN — ONDANSETRON 4 MILLIGRAM(S): 8 TABLET, FILM COATED ORAL at 23:49

## 2021-10-08 RX ADMIN — Medication 101.2 MILLIGRAM(S): at 23:49

## 2021-10-08 NOTE — ED ADULT TRIAGE NOTE - CHIEF COMPLAINT QUOTE
S/P neck  surgery last Tuesday- with severe neck pain since, cant swallow and eat; denies fever, chills; took Oxycodone  prior to ER

## 2021-10-09 VITALS
SYSTOLIC BLOOD PRESSURE: 111 MMHG | TEMPERATURE: 98 F | OXYGEN SATURATION: 98 % | HEART RATE: 83 BPM | RESPIRATION RATE: 16 BRPM | DIASTOLIC BLOOD PRESSURE: 77 MMHG

## 2021-10-09 LAB
ALBUMIN SERPL ELPH-MCNC: 4.2 G/DL — SIGNIFICANT CHANGE UP (ref 3.3–5)
ALP SERPL-CCNC: 60 U/L — SIGNIFICANT CHANGE UP (ref 40–120)
ALT FLD-CCNC: <5 U/L — LOW (ref 10–45)
ANION GAP SERPL CALC-SCNC: 9 MMOL/L — SIGNIFICANT CHANGE UP (ref 5–17)
AST SERPL-CCNC: 18 U/L — SIGNIFICANT CHANGE UP (ref 10–40)
BILIRUB SERPL-MCNC: 0.5 MG/DL — SIGNIFICANT CHANGE UP (ref 0.2–1.2)
BUN SERPL-MCNC: 9 MG/DL — SIGNIFICANT CHANGE UP (ref 7–23)
CALCIUM SERPL-MCNC: 9.3 MG/DL — SIGNIFICANT CHANGE UP (ref 8.4–10.5)
CHLORIDE SERPL-SCNC: 102 MMOL/L — SIGNIFICANT CHANGE UP (ref 96–108)
CO2 SERPL-SCNC: 29 MMOL/L — SIGNIFICANT CHANGE UP (ref 22–31)
CREAT SERPL-MCNC: 0.58 MG/DL — SIGNIFICANT CHANGE UP (ref 0.5–1.3)
GLUCOSE SERPL-MCNC: 99 MG/DL — SIGNIFICANT CHANGE UP (ref 70–99)
MAGNESIUM SERPL-MCNC: 1.9 MG/DL — SIGNIFICANT CHANGE UP (ref 1.6–2.6)
POTASSIUM SERPL-MCNC: 4.1 MMOL/L — SIGNIFICANT CHANGE UP (ref 3.5–5.3)
POTASSIUM SERPL-SCNC: 4.1 MMOL/L — SIGNIFICANT CHANGE UP (ref 3.5–5.3)
PROT SERPL-MCNC: 7.6 G/DL — SIGNIFICANT CHANGE UP (ref 6–8.3)
SARS-COV-2 RNA SPEC QL NAA+PROBE: NEGATIVE — SIGNIFICANT CHANGE UP
SODIUM SERPL-SCNC: 140 MMOL/L — SIGNIFICANT CHANGE UP (ref 135–145)

## 2021-10-09 PROCEDURE — 96375 TX/PRO/DX INJ NEW DRUG ADDON: CPT

## 2021-10-09 PROCEDURE — 70490 CT SOFT TISSUE NECK W/O DYE: CPT | Mod: 26,MA

## 2021-10-09 PROCEDURE — 96374 THER/PROPH/DIAG INJ IV PUSH: CPT

## 2021-10-09 PROCEDURE — 70490 CT SOFT TISSUE NECK W/O DYE: CPT | Mod: MA

## 2021-10-09 PROCEDURE — 96376 TX/PRO/DX INJ SAME DRUG ADON: CPT

## 2021-10-09 PROCEDURE — 80053 COMPREHEN METABOLIC PANEL: CPT

## 2021-10-09 PROCEDURE — 87635 SARS-COV-2 COVID-19 AMP PRB: CPT

## 2021-10-09 PROCEDURE — 83735 ASSAY OF MAGNESIUM: CPT

## 2021-10-09 PROCEDURE — 85025 COMPLETE CBC W/AUTO DIFF WBC: CPT

## 2021-10-09 PROCEDURE — 99284 EMERGENCY DEPT VISIT MOD MDM: CPT | Mod: 25

## 2021-10-09 PROCEDURE — 36415 COLL VENOUS BLD VENIPUNCTURE: CPT

## 2021-10-09 RX ORDER — METHOCARBAMOL 500 MG/1
500 TABLET, FILM COATED ORAL ONCE
Refills: 0 | Status: COMPLETED | OUTPATIENT
Start: 2021-10-09 | End: 2021-10-09

## 2021-10-09 RX ORDER — MORPHINE SULFATE 50 MG/1
4 CAPSULE, EXTENDED RELEASE ORAL ONCE
Refills: 0 | Status: DISCONTINUED | OUTPATIENT
Start: 2021-10-09 | End: 2021-10-09

## 2021-10-09 RX ADMIN — MORPHINE SULFATE 4 MILLIGRAM(S): 50 CAPSULE, EXTENDED RELEASE ORAL at 05:50

## 2021-10-09 RX ADMIN — METHOCARBAMOL 500 MILLIGRAM(S): 500 TABLET, FILM COATED ORAL at 10:06

## 2021-10-09 RX ADMIN — Medication 5 MILLIGRAM(S): at 07:47

## 2021-10-09 NOTE — ED PROVIDER NOTE - PATIENT PORTAL LINK FT
You can access the FollowMyHealth Patient Portal offered by [facility] by registering at the following website: [email or web address]. By joining [facility]’s FollowMyHealth portal, you will also be able to view your health information using other applications (apps) compatible with our system.

## 2021-10-09 NOTE — ED PROVIDER NOTE - PROGRESS NOTE DETAILS
pending ortho rec ortho recommending CT neck Richard: signed out to me pending ct results and ortho recs - ortho reviewed ct - ok for discharge with prednisone 5mg x 30 days - pt to follow up with Dr. Lewis as outpt.  Discussed with patient, ambulette set up for discharge.

## 2021-10-09 NOTE — CONSULT NOTE ADULT - SUBJECTIVE AND OBJECTIVE BOX
Orthopaedic Surgery Consult Note    For Surgeon:    HPI:  56yFemale  Patient is a 56y old  Female who presents with a chief complaint of   HPI:      Allergies    NSAIDs (Unknown)  penicillins (Hives)  shellfish (Unknown)    Intolerances      PAST MEDICAL & SURGICAL HISTORY:  Lupus    Rheumatoid arthritis    Fibromyalgia      MEDICATIONS  (STANDING):  morphine  - Injectable 4 milliGRAM(s) IV Push Once    MEDICATIONS  (PRN):      Vital Signs Last 24 Hrs  T(C): 36.9 (09 Oct 2021 02:31), Max: 36.9 (09 Oct 2021 02:31)  T(F): 98.5 (09 Oct 2021 02:31), Max: 98.5 (09 Oct 2021 02:31)  HR: 92 (09 Oct 2021 02:31) (92 - 102)  BP: 138/89 (09 Oct 2021 02:31) (120/77 - 138/89)  BP(mean): --  RR: 18 (09 Oct 2021 02:31) (18 - 20)  SpO2: 100% (09 Oct 2021 02:31) (100% - 100%)    Physical Exam:                          10.6   8.32  )-----------( 241      ( 08 Oct 2021 23:41 )             32.6     10-08    140  |  102  |  9   ----------------------------<  99  4.1   |  29  |  0.58    Ca    9.3      08 Oct 2021 23:41  Mg     1.9     10-08    TPro  7.6  /  Alb  4.2  /  TBili  0.5  /  DBili  x   /  AST  18  /  ALT  <5<L>  /  AlkPhos  60  10-08      Imaging:     A/P: 56yFemale    -Discussed with

## 2021-10-09 NOTE — ED ADULT NURSE NOTE - MODE OF DISCHARGE
pt was awaiting ambulance service, pt did not want to wait anymore and was escorted in a wheelchair to a [facility] service home./Ambulatory

## 2021-10-09 NOTE — ED PROVIDER NOTE - CARE PROVIDER_API CALL
Olivier Lewis)  [facility]  [address] [place] [address], 10[address] [place] [address]  Phone: [phone]  Fax: [phone]  Follow Up Time:

## 2021-10-09 NOTE — ED PROVIDER NOTE - COVID-19 RESULT DATE/TIME
January 24, 2020    [employer]  [place], [place]       [facility]  [address] [place] [id number]  Phone: [phone]  Fax: [phone] January 24, 2020     Patient: Allison Gonsalez    YOB: 1974   Date of Visit: 1/24/2020       To Whom It May Concern:    It is my medical opinion that Allison Gonsalez  may return to full participation immediately with no restrictions.    Patient had been instructed not to take cough medication during working hours.    If you have any questions or concerns, please don't hesitate to call.    Sincerely,        Santhosh Chand MD      04-Oct-2021 03:12

## 2021-10-09 NOTE — ED PROVIDER NOTE - CLINICAL SUMMARY MEDICAL DECISION MAKING FREE TEXT BOX
s/p recent cspine surgery, now with persistent neck pain, difficulty swallowing d/t pain, no oral swelling, no airway compromise, no resp distress  -check labs  -ivf  -morphine, decadron

## 2021-10-09 NOTE — ED PROVIDER NOTE - NSFOLLOWUPINSTRUCTIONS_ED_ALL_ED_FT
Pain Management After Surgery    WHAT YOU NEED TO KNOW:    What do I need to know about pain management after surgery? Good control of your pain will help you heal from surgery and return to your normal activities. It will also help you do important activities such as walking and deep breathing. If your pain prevents you from doing these activities, you may be at risk for complications. Examples include a blood clot or pneumonia. Tell your healthcare provider if your pain is not controlled. You may need changes to your medicine or treatment plan.    Which medicines are used to manage pain?   •Acetaminophen decreases pain and fever. It is available without a doctor's order. Ask how much to take and how often to take it. Follow directions. Read the labels of all other medicines you are using to see if they also contain acetaminophen, or ask your doctor or pharmacist. Acetaminophen can cause liver damage if not taken correctly. Do not use more than 4 grams (4,000 milligrams) total of acetaminophen in one day.     •NSAIDs, such as ibuprofen, help decrease swelling, pain, and fever. This medicine is available with or without a doctor's order. NSAIDs can cause stomach bleeding or kidney problems in certain people. If you take blood thinner medicine, always ask your healthcare provider if NSAIDs are safe for you. Always read the medicine label and follow directions.    •Prescription pain medicine may be given. Ask your healthcare provider how to take this medicine safely. Some prescription pain medicines contain acetaminophen. Do not take other medicines that contain acetaminophen without talking to your healthcare provider. Too much acetaminophen may cause liver damage. Prescription pain medicine may cause constipation. Ask your healthcare provider how to prevent or treat constipation.     •Anxiety medicine decreases anxiety. High levels of anxiety make pain harder to manage.    •Muscle relaxers help decrease pain by relaxing your muscles and preventing spasms.    What can I do to manage pain without medicine?   •Apply heat or ice, if recommended. Your surgeon will tell you if this is recommended after the kind of surgery you had. Heat helps decrease pain and muscle spasms. Apply heat for 20 to 30 minutes every 2 hours for as directed. Ice helps prevent tissue damage and decreases swelling and pain. Apply ice for 15 to 20 minutes every hour or as directed. Use an ice pack, or put crushed ice in a plastic bag. Cover it with a towel before you apply it to the surgery area.    •Elevate the surgery area above the level of your heart if possible. This will help decrease swelling and pain. Prop your painful area on pillows or blankets to keep it elevated comfortably.    •Apply compression with an elastic bandage or abdominal binder as directed. An elastic bandage may be used after surgery on your joint, such as your knee. An abdominal binder may be used for surgeries in your abdomen.    •Sleep in a comfortable position. Examples include sleeping upright or on your side. Use pillows to support painful areas.    •Ask your healthcare provider about other ways to manage pain without medicine. Examples include relaxation techniques, music, or acupuncture.    When should I seek immediate care?   •You have severe pain.    When should I call my doctor?   •Your pain does not get better after take your pain medicine.    •You have new or worsening pain.    •You have questions or concerns about your condition or care.    CARE AGREEMENT:    You have the right to help plan your care. Learn about your health condition and how it may be treated. Discuss treatment options with your healthcare providers to decide what care you want to receive. You always have the right to refuse treatment. Take prednisone as prescribed.    Follow up with Dr. Lewis as scheduled.    Pain Management After Surgery    WHAT YOU NEED TO KNOW:    What do I need to know about pain management after surgery? Good control of your pain will help you heal from surgery and return to your normal activities. It will also help you do important activities such as walking and deep breathing. If your pain prevents you from doing these activities, you may be at risk for complications. Examples include a blood clot or pneumonia. Tell your healthcare provider if your pain is not controlled. You may need changes to your medicine or treatment plan.    Which medicines are used to manage pain?   •Acetaminophen decreases pain and fever. It is available without a doctor's order. Ask how much to take and how often to take it. Follow directions. Read the labels of all other medicines you are using to see if they also contain acetaminophen, or ask your doctor or pharmacist. Acetaminophen can cause liver damage if not taken correctly. Do not use more than 4 grams (4,000 milligrams) total of acetaminophen in one day.     •NSAIDs, such as ibuprofen, help decrease swelling, pain, and fever. This medicine is available with or without a doctor's order. NSAIDs can cause stomach bleeding or kidney problems in certain people. If you take blood thinner medicine, always ask your healthcare provider if NSAIDs are safe for you. Always read the medicine label and follow directions.    •Prescription pain medicine may be given. Ask your healthcare provider how to take this medicine safely. Some prescription pain medicines contain acetaminophen. Do not take other medicines that contain acetaminophen without talking to your healthcare provider. Too much acetaminophen may cause liver damage. Prescription pain medicine may cause constipation. Ask your healthcare provider how to prevent or treat constipation.     •Anxiety medicine decreases anxiety. High levels of anxiety make pain harder to manage.    •Muscle relaxers help decrease pain by relaxing your muscles and preventing spasms.    What can I do to manage pain without medicine?   •Apply heat or ice, if recommended. Your surgeon will tell you if this is recommended after the kind of surgery you had. Heat helps decrease pain and muscle spasms. Apply heat for 20 to 30 minutes every 2 hours for as directed. Ice helps prevent tissue damage and decreases swelling and pain. Apply ice for 15 to 20 minutes every hour or as directed. Use an ice pack, or put crushed ice in a plastic bag. Cover it with a towel before you apply it to the surgery area.    •Elevate the surgery area above the level of your heart if possible. This will help decrease swelling and pain. Prop your painful area on pillows or blankets to keep it elevated comfortably.    •Apply compression with an elastic bandage or abdominal binder as directed. An elastic bandage may be used after surgery on your joint, such as your knee. An abdominal binder may be used for surgeries in your abdomen.    •Sleep in a comfortable position. Examples include sleeping upright or on your side. Use pillows to support painful areas.    •Ask your healthcare provider about other ways to manage pain without medicine. Examples include relaxation techniques, music, or acupuncture.    When should I seek immediate care?   •You have severe pain.    When should I call my doctor?   •Your pain does not get better after take your pain medicine.    •You have new or worsening pain.    •You have questions or concerns about your condition or care.    CARE AGREEMENT:    You have the right to help plan your care. Learn about your health condition and how it may be treated. Discuss treatment options with your healthcare providers to decide what care you want to receive. You always have the right to refuse treatment.

## 2021-10-09 NOTE — ED PROVIDER NOTE - OBJECTIVE STATEMENT
56F hx fibromyalgia, RA, lupus, s/p cervical spinal surgery with Dr. Lewis 10/5.  pt had anterior cervical discectomy and fusion of C4/5. states having persistent neck pain. pt states barely able to swallow d/t pain. states having trouble taking her pills and tolerating food.  pt states she received a call from Dr. Joshua chris advising her to come to the ED for steroid.

## 2021-10-09 NOTE — ED ADULT NURSE NOTE - OBJECTIVE STATEMENT
57yo female co neck pain status post cervical surgery 55yo female status post cervical surgery with discharge on 10/7 co severe neck pain since Tuesday. States she is having difficulty swallowing/eating. Attempted to treat with PO oxycodone prior to arrival without relief.

## 2021-10-09 NOTE — ED PROVIDER NOTE - ENMT, MLM
Airway patent, Nasal mucosa clear. Mouth with normal mucosa. able to open mouth slightly, no intraoral swelling, no drooling

## 2021-10-15 ENCOUNTER — APPOINTMENT (OUTPATIENT)
Dept: INTERNAL MEDICINE | Facility: CLINIC | Age: 56
End: 2021-10-15
Payer: MEDICAID

## 2021-10-15 VITALS
SYSTOLIC BLOOD PRESSURE: 118 MMHG | HEART RATE: 88 BPM | OXYGEN SATURATION: 99 % | HEIGHT: 63 IN | TEMPERATURE: 98.7 F | DIASTOLIC BLOOD PRESSURE: 84 MMHG

## 2021-10-15 DIAGNOSIS — M32.9 SYSTEMIC LUPUS ERYTHEMATOSUS, UNSPECIFIED: ICD-10-CM

## 2021-10-15 DIAGNOSIS — D62 ACUTE POSTHEMORRHAGIC ANEMIA: ICD-10-CM

## 2021-10-15 DIAGNOSIS — M47.12 OTHER SPONDYLOSIS WITH MYELOPATHY, CERVICAL REGION: ICD-10-CM

## 2021-10-15 DIAGNOSIS — Z88.0 ALLERGY STATUS TO PENICILLIN: ICD-10-CM

## 2021-10-15 DIAGNOSIS — M50.121 CERVICAL DISC DISORDER AT C4-C5 LEVEL WITH RADICULOPATHY: ICD-10-CM

## 2021-10-15 DIAGNOSIS — Z79.52 LONG TERM (CURRENT) USE OF SYSTEMIC STEROIDS: ICD-10-CM

## 2021-10-15 DIAGNOSIS — R51.9 HEADACHE, UNSPECIFIED: ICD-10-CM

## 2021-10-15 DIAGNOSIS — M79.7 FIBROMYALGIA: ICD-10-CM

## 2021-10-15 DIAGNOSIS — M50.021 CERVICAL DISC DISORDER AT C4-C5 LEVEL WITH MYELOPATHY: ICD-10-CM

## 2021-10-15 DIAGNOSIS — Z23 ENCOUNTER FOR IMMUNIZATION: ICD-10-CM

## 2021-10-15 DIAGNOSIS — K21.9 GASTRO-ESOPHAGEAL REFLUX DISEASE WITHOUT ESOPHAGITIS: ICD-10-CM

## 2021-10-15 DIAGNOSIS — M47.22 OTHER SPONDYLOSIS WITH RADICULOPATHY, CERVICAL REGION: ICD-10-CM

## 2021-10-15 DIAGNOSIS — Z99.89 DEPENDENCE ON OTHER ENABLING MACHINES AND DEVICES: ICD-10-CM

## 2021-10-15 DIAGNOSIS — G47.33 OBSTRUCTIVE SLEEP APNEA (ADULT) (PEDIATRIC): ICD-10-CM

## 2021-10-15 DIAGNOSIS — Z91.013 ALLERGY TO SEAFOOD: ICD-10-CM

## 2021-10-15 DIAGNOSIS — M06.9 RHEUMATOID ARTHRITIS, UNSPECIFIED: ICD-10-CM

## 2021-10-15 PROCEDURE — 90686 IIV4 VACC NO PRSV 0.5 ML IM: CPT

## 2021-10-15 PROCEDURE — 99214 OFFICE O/P EST MOD 30 MIN: CPT | Mod: 25

## 2021-10-15 PROCEDURE — G0008: CPT

## 2021-10-15 PROCEDURE — 99072 ADDL SUPL MATRL&STAF TM PHE: CPT

## 2021-10-15 RX ORDER — OMEPRAZOLE 40 MG/1
40 CAPSULE, DELAYED RELEASE ORAL
Refills: 0 | Status: DISCONTINUED | COMMUNITY
End: 2021-10-15

## 2021-10-18 PROCEDURE — 72050 X-RAY EXAM NECK SPINE 4/5VWS: CPT

## 2021-10-18 PROCEDURE — 86803 HEPATITIS C AB TEST: CPT

## 2021-10-18 PROCEDURE — 94660 CPAP INITIATION&MGMT: CPT

## 2021-10-18 PROCEDURE — 87635 SARS-COV-2 COVID-19 AMP PRB: CPT

## 2021-10-18 PROCEDURE — 70496 CT ANGIOGRAPHY HEAD: CPT | Mod: MA

## 2021-10-18 PROCEDURE — 96375 TX/PRO/DX INJ NEW DRUG ADDON: CPT

## 2021-10-18 PROCEDURE — 70498 CT ANGIOGRAPHY NECK: CPT | Mod: MA

## 2021-10-18 PROCEDURE — 36415 COLL VENOUS BLD VENIPUNCTURE: CPT

## 2021-10-18 PROCEDURE — C1889: CPT

## 2021-10-18 PROCEDURE — 85025 COMPLETE CBC W/AUTO DIFF WBC: CPT

## 2021-10-18 PROCEDURE — 76000 FLUOROSCOPY <1 HR PHYS/QHP: CPT

## 2021-10-18 PROCEDURE — C1713: CPT

## 2021-10-18 PROCEDURE — 85730 THROMBOPLASTIN TIME PARTIAL: CPT

## 2021-10-18 PROCEDURE — 72040 X-RAY EXAM NECK SPINE 2-3 VW: CPT

## 2021-10-18 PROCEDURE — 86900 BLOOD TYPING SEROLOGIC ABO: CPT

## 2021-10-18 PROCEDURE — 96374 THER/PROPH/DIAG INJ IV PUSH: CPT | Mod: XU

## 2021-10-18 PROCEDURE — 85610 PROTHROMBIN TIME: CPT

## 2021-10-18 PROCEDURE — C9399: CPT

## 2021-10-18 PROCEDURE — 86769 SARS-COV-2 COVID-19 ANTIBODY: CPT

## 2021-10-18 PROCEDURE — 70450 CT HEAD/BRAIN W/O DYE: CPT | Mod: MA

## 2021-10-18 PROCEDURE — 85027 COMPLETE CBC AUTOMATED: CPT

## 2021-10-18 PROCEDURE — 86901 BLOOD TYPING SEROLOGIC RH(D): CPT

## 2021-10-18 PROCEDURE — 99285 EMERGENCY DEPT VISIT HI MDM: CPT | Mod: 25

## 2021-10-18 PROCEDURE — 71045 X-RAY EXAM CHEST 1 VIEW: CPT

## 2021-10-18 PROCEDURE — 72141 MRI NECK SPINE W/O DYE: CPT | Mod: MA

## 2021-10-18 PROCEDURE — 97116 GAIT TRAINING THERAPY: CPT

## 2021-10-18 PROCEDURE — 86850 RBC ANTIBODY SCREEN: CPT

## 2021-10-18 PROCEDURE — 72125 CT NECK SPINE W/O DYE: CPT | Mod: MA

## 2021-10-18 PROCEDURE — 81025 URINE PREGNANCY TEST: CPT

## 2021-10-18 PROCEDURE — 81001 URINALYSIS AUTO W/SCOPE: CPT

## 2021-10-18 PROCEDURE — 80048 BASIC METABOLIC PNL TOTAL CA: CPT

## 2021-10-18 PROCEDURE — 97161 PT EVAL LOW COMPLEX 20 MIN: CPT

## 2021-10-19 ENCOUNTER — APPOINTMENT (OUTPATIENT)
Dept: RHEUMATOLOGY | Facility: CLINIC | Age: 56
End: 2021-10-19
Payer: MEDICAID

## 2021-10-19 ENCOUNTER — NON-APPOINTMENT (OUTPATIENT)
Age: 56
End: 2021-10-19

## 2021-10-19 VITALS
BODY MASS INDEX: 23.04 KG/M2 | HEART RATE: 83 BPM | HEIGHT: 63 IN | TEMPERATURE: 97.5 F | WEIGHT: 130 LBS | SYSTOLIC BLOOD PRESSURE: 109 MMHG | OXYGEN SATURATION: 98 % | DIASTOLIC BLOOD PRESSURE: 73 MMHG

## 2021-10-19 PROCEDURE — 99204 OFFICE O/P NEW MOD 45 MIN: CPT

## 2021-10-19 PROCEDURE — 99072 ADDL SUPL MATRL&STAF TM PHE: CPT

## 2021-10-20 NOTE — DATA REVIEWED
[FreeTextEntry1] : rebecca positive 1:160\par RF neg\par CCP neg\par smith neg\par RNP neg\par dsdna neg\par ssa /ssb neg\par scl 70- neg\par centromere neg\par ACL ab neg\par lac neg\par FRANKLYN a 1 ab\par ESR 9\par CRP 1.65\par \par DEXA osteopenia 10/ 2020

## 2021-10-20 NOTE — HISTORY OF PRESENT ILLNESS
[FreeTextEntry1] : 56 year old woman with history of SLE, here to establish care.\par Patient previously followed by Thanh Mcgarry, most recently at [place] \par Rheumatologist, Dr. Deya Rivera at [place]\par Takes plaquenil 200 mg bid, Monday through Thursday, then once per day  \par Sees pain management regularly\par Plaquenil 200 mg qday since 2014\par Last ophthalmology last month, had visual field testing as well follow up with neuro ophthalmologist given pituitary adenoma\par \par Neurologist prescribed amitriptylline for migraines as well\par Patient with pain in the PIP joints bilateral hands\par Pain all over the body\par Reports cousin with SLE, with renal manifestations\par Reviewed lab results on My Chart portal on patient's phone:\par \par BAILEY 1:160, serologies negative, complements normal\par RF and CCP negative\par anti Rachael 1 ab negative\par ACL and LAC negative\par \par Reports arthralgias, oral ulcers, and photosensitivity\par History of major depression following loss of her son in .\par Follows closely with psychiatrist (follow up tomorrow) and therapist\par Pain management as well.\par \par From PMD visit: October 15, 2021\par Brief Hospital Course: 56F w/ a PMHx prolactinoma, SLE, depression/anxiety, GERD, fibromyalgia, NEVIN (on CPAP), osteoporosis, rheumatid arthritis, and COVID (8/2020) presents for a post-discharge appointment. Patient hospitalized at [facility] from 10/4/2021 - 10/7/2021 where she was found to have C4/5 cord compression and underwent ACDF on 10/5/2021 with Dr. Lewis. Patient returned to [facility] on 10/9 w/ persistent neck pain and difficulty swallowing. She was discharged on oxycodone 5 mg, gabapentin 300 mg TID, acetaminophen, and prednisone. Patient reports pain remains poorly controlled on current regimen, which is interfering with her ability to ambulate. Patient reports continued pain, weakness, numbness, and difficulty controlling left upper extremity. Patient has been wearing neck collar. Reports difficulty swallowing and is currently on liquid diet. States she has been able to take PO medications. She has an appointment scheduled with Dr. Lewis in 1 week on Friday 10/22. Denies fevers, headaches, chest pain, sob, abdominal pain, n/v/d. \par

## 2021-10-20 NOTE — ASSESSMENT
[FreeTextEntry1] : 56 year old woman with history of SLE, treated with palquenil.  At this time, review of recent labs with BAILEY positive with al serologies negative, complements normal, and antiphospholipid antibodies as well.  Patient reports previous photosensitivity, oral ulcers, and arthralgias as ell.  At this time, patient does not have signs or symptoms of active lupus, would continue to taper Plaquenil to once daily dosing and then reassess.  Would continue pain management follow up and has appointment with psychiatrist pending given history of depression as well.  PT when cleared by spine surgeon to proceed.  Follow up in four months.

## 2021-10-20 NOTE — PHYSICAL EXAM
[General Appearance - Alert] : alert [Respiration, Rhythm And Depth] : normal respiratory rhythm and effort [Exaggerated Use Of Accessory Muscles For Inspiration] : no accessory muscle use [Edema] : there was no peripheral edema [Abnormal Walk] : normal gait [Musculoskeletal - Swelling] : no joint swelling seen [] : no rash [Skin Lesions] : no skin lesions [Oriented To Time, Place, And Person] : oriented to person, place, and time [Impaired Insight] : insight and judgment were intact [FreeTextEntry1] : tearful when talks about her son

## 2021-10-21 ENCOUNTER — APPOINTMENT (OUTPATIENT)
Dept: INTERNAL MEDICINE | Facility: CLINIC | Age: 56
End: 2021-10-21

## 2021-10-22 ENCOUNTER — APPOINTMENT (OUTPATIENT)
Dept: ORTHOPEDIC SURGERY | Facility: CLINIC | Age: 56
End: 2021-10-22
Payer: OTHER MISCELLANEOUS

## 2021-10-22 PROCEDURE — 72040 X-RAY EXAM NECK SPINE 2-3 VW: CPT

## 2021-10-22 PROCEDURE — 99024 POSTOP FOLLOW-UP VISIT: CPT

## 2021-10-22 NOTE — PHYSICAL THERAPY INITIAL EVALUATION ADULT - DIAGNOSIS, PT EVAL
4F: Impaired joint mobility, motor function, muscle performance, and range of motion and reflex integrity associated with spinal disorders Referring Physician (Optional): VALENTINE Martin PA-C

## 2021-10-22 NOTE — HISTORY OF PRESENT ILLNESS
[___ Weeks Post Op] : [unfilled] weeks post op [de-identified] : s/p ACDF C4-C5 w/ iliac crest autograft [de-identified] : 56 year old female 2 weeks s/p ACDF C4-C5 w/ iliac crest autograft.  She was seen in the ED with increased pain and had a cervical CT scan which showed hardware in appropriate position and alignment as well as no significant post-op hematoma.  She has reported improvement in swallowing.  She has been eating soft foods without issue.  She reports pain in her neck, low back and left shoulder.  She denies radicular pain, difficulty breathing, recent illness, fevers, saddle anesthesia, urinary retention or fecal incontinence. [de-identified] : MSK:\par Cervical Spine: \par Incision well healed\par [place] site incision c/d/i, staples removed and steri-strips applied\par \par NEURO:\par Sensation \par          Left           \par C5     2/2               \par C6     2/2               \par C7     2/2               \par C8     2/2              \par T1     2/2             \par \par          Right         \par C5     2/2               \par C6     2/2               \par C7     2/2               \par C8     2/2              \par T1     2/2      \par \par Motor: \par                                                Left             \par C5 (deltoid abduction)             5/5               \par C6 (biceps flexion)                   5/5                \par C7 (triceps extension)             5/5               \par C8 (finger flexion)                     5/5               \par T1 (interosseous)                     5/5           \par \par                                                Right           \par C5 (deltoid abduction)             5/5               \par C6 (biceps flexion)                   5/5                \par C7 (triceps extension)             5/5               \par C8 (finger flexion)                     5/5               \par T1 (interosseous)                     5/5                     \par \par Sensation \par Left L2  -  2/2            \par Left L3  -  2/2\par Left L4  -  2/2\par Left L5  -  2/2\par Left S1  -  2/2\par \par Right L2  -  2/2            \par Right L3  -  2/2\par Right L4  -  2/2\par Right L5  -  2/2\par Right S1  -  2/2\par \par Motor: \par Left L2 (hip flexion)                            5/5                \par Left L3 (knee extension)                   5/5                \par Left L4 (ankle dorsiflexion)                 5/5                \par Left L5 (long toe extensor)                5/5                \par Left S1 (ankle plantar flexion)           5/5\par \par Right L2 (hip flexion)                            5/5                \par Right L3 (knee extension)                   5/5                \par Right L4 (ankle dorsiflexion)                 5/5                \par Right L5 (long toe extensor)                5/5                \par Right S1 (ankle plantar flexion)           5/5 [de-identified] : I ordered cervical radiographs to evaluate her hardware\par \par Cervical 2 view radiographs obtained in the office today shows no fracture or dislocation.  s/p ACDF C4-C5 with hardware in appropriate alignment [de-identified] : 56 year old female 2 weeks s/p ACDF C4-C5 w/ iliac crest autograft.  [de-identified] : Patient's pain has been improving since surgery.  She is neurologically intact (she has neck pain with movement of her upper extremities but strength remains 5/5). She reports improvement in swallowing and is tolerating soft foods. She was given a refill for oxycodone and methocarbamol.  She will continue hard cervical orthosis.  She will avoid heavy lifting twisting or bending.  She will followup in 4 weeks. We discussed red flag symptoms that would require emergent evaluation. She knows to call with any questions or concerns or if her symptoms acutely worsen.

## 2021-10-28 ENCOUNTER — APPOINTMENT (OUTPATIENT)
Dept: ENDOCRINOLOGY | Facility: CLINIC | Age: 56
End: 2021-10-28
Payer: MEDICAID

## 2021-10-28 VITALS
BODY MASS INDEX: 23.04 KG/M2 | TEMPERATURE: 96.3 F | DIASTOLIC BLOOD PRESSURE: 72 MMHG | HEART RATE: 80 BPM | WEIGHT: 130 LBS | HEIGHT: 63 IN | SYSTOLIC BLOOD PRESSURE: 101 MMHG

## 2021-10-28 PROCEDURE — 99204 OFFICE O/P NEW MOD 45 MIN: CPT

## 2021-10-28 PROCEDURE — 99072 ADDL SUPL MATRL&STAF TM PHE: CPT

## 2021-10-28 RX ORDER — TOPIRAMATE 100 MG/1
100 TABLET, COATED ORAL
Refills: 0 | Status: DISCONTINUED | COMMUNITY
End: 2021-10-28

## 2021-10-28 RX ORDER — RIBOFLAVIN (VITAMIN B2) 400 MG
400 TABLET ORAL
Qty: 30 | Refills: 5 | Status: DISCONTINUED | COMMUNITY
Start: 2019-12-16 | End: 2021-10-28

## 2021-10-28 NOTE — REVIEW OF SYSTEMS
[Fatigue] : no fatigue [Decreased Appetite] : appetite not decreased [Dysphagia] : no dysphagia [Chest Pain] : no chest pain [Palpitations] : no palpitations

## 2021-10-28 NOTE — REASON FOR VISIT
[Initial Evaluation] : an initial evaluation [Pituitary Evaluation/ Disorder] : pituitary evaluation/disorder [Osteoporosis] : osteoporosis [FreeTextEntry2] : Dr. Susan Tucker

## 2021-10-28 NOTE — ASSESSMENT
[Bisphosphonate Therapy] : Risks and benefits of bisphosphonate therapy were  discussed with the patient including gastroesophageal irritation, osteonecrosis of the jaw, and atypical femur fractures, and acute phase reaction [Bisphosphonates] : The patient was instructed to take bisphosphonates on an empty stomach with a full glass of water,and wait at least 30 minutes before eating or lying down [FreeTextEntry1] : Patient is a 55 yo woman with history of prolactinoma and osteoporosis establishing endocrine care.\par \par 1. Prolactinoma\par -patient with remote history of prolactinoma.  Was seen by several endocrinologists in the past and took bromocriptine for 7-8 years. She had resolution of galactorrhea and has been off of it for many years\par -states intermittent galactorrhea, none on exam today\par -pituitary MRI from March 2020 revealed no definite hypo enhancing pituitary adenoma.  She declines MRI as she has gotten a lot of imaging recently.  CT of the head was done on 10/21 and it reveals no gross pituitary findings, the results were reviewed and embedded in the chart\par -repeat prolactin levels\par -had early menopause without climacteric symptoms\par \par 2. Osteoporosis\par -prescribed alendronate by rheumatology in 2019\par -adheres without reported side effects\par -denies clinical fracture history but had cervical surgery for pain\par -no recent falls\par -fall precautions encouraged\par -no plans for major dental work\par -managed by rheumatology; reports DXA done at [facility]-will bring with her to next visit\par \par 6 month follow up

## 2021-10-28 NOTE — CONSULT LETTER
[Dear  ___] : Dear  [unfilled], [Consult Letter:] : I had the pleasure of evaluating your patient, [unfilled]. [Please see my note below.] : Please see my note below. [Consult Closing:] : Thank you very much for allowing me to participate in the care of this patient.  If you have any questions, please do not hesitate to contact me. [Sincerely,] : Sincerely, [FreeTextEntry3] : Marta Bergman MD

## 2021-10-28 NOTE — DATA REVIEWED
[FreeTextEntry1] : EXAM: CT BRAIN\par \par PROCEDURE DATE: 10/03/2021\par INTERPRETATION: PROCEDURE: CT head without intravenous contrast\par \par CLINICAL INDICATION:\par \par TECHNIQUE: Multiple axial images were obtained and viewed at 5 mm intervals from the skull base to the vertex. The images were reviewed in brain and bone windows. Sagittal and coronal reformations are provided.\par \par COMPARISON EXAMINATION: CT head from 9/21/2010.\par \par FINDINGS:\par VENTRICLES AND SULCI: The ventricles, cisternal spaces, and sulci are appropriate for patient's age. No hydrocephalus.\par INTRA-AXIAL: No intracranial mass, acute hemorrhage, or midline shift is present. No acute transcortical infarction.\par EXTRA-AXIAL: No extra-axial fluid collection is present.\par VISUALIZED SINUSES: No air-fluid levels are identified.\par VISUALIZED MASTOIDS: Well aerated.\par CALVARIUM: No fracture.\par MISCELLANEOUS: None.\par \par IMPRESSION:\par \par No acute intracranial hemorrhage or transcortical infarction.\par \par --- End of Report ---\par \par Thank you for the opportunity to participate in the care of this patient.\par \par KHALED AL TAWIL MD; Resident Radiologist\par This document has been electronically signed.\par LIZA PEPPER MD; Attending Radiologist\par This document has been electronically signed. Oct 4 2021 12:49AM

## 2021-10-28 NOTE — HISTORY OF PRESENT ILLNESS
[Alendronate (Fosomax)] : Alendronate [FreeTextEntry1] : Patient is a 55 yo woman establishing care for pituitary adenoma and osteoporosis\par \par Pituitary adenoma in 1992 when she presented with breast milk discharge.  She was 2 years post-partum at this time.  Had previous endocrinologists in [place] but has not been seen in 10 years.  Was on bromocriptine for 7-8 years and it was ultimately stopped. States the galactorrhea is intermittent. \par Pituitary MRI May 2020: no definite hypo enhancing pituitary adenomas or Rathke's cleft cyst demonstrated\par Menarche at age 17 years and last menstrual period was at age 37\par Has one son, natural conception\par \par Diagnosed with osteoporosis since 2014.  Was diagnosed with lupus and around 2019 was told to start taking alendronate,  managed by rheumatology  \par Denies clinical fracture.\par Drinks milk; diet is fairly healthy\par History of benign breast lumpectomy\par States she had a bone density at [facility] recently but results are not available\par Adheres to alendronate without reported side effects\par Ambulates with cane, no recent falls\par No major dental work planned [Taking Steroids] : no history of taking steroids [Diabetes] : no history of diabetes [Excessive Alcohol Intake] : no excessive alcohol intake [Family History of Breast Cancer] : no family history of breast cancer [Family History of Hip Fracture] : no family history of hip fracture [Family History of Osteoporosis] : no family history of osteoporosis [History of Radiation Therapy] : no history of radiation therapy [High Fall Risk] : no fall risk [Frequent Falls] : no frequent falls

## 2021-10-29 LAB
PROLACTIN SERPL-MCNC: 6.2 NG/ML
T4 FREE SERPL-MCNC: 1 NG/DL
TSH SERPL-ACNC: 1.22 UIU/ML

## 2021-11-02 ENCOUNTER — APPOINTMENT (OUTPATIENT)
Dept: ORTHOPEDIC SURGERY | Facility: CLINIC | Age: 56
End: 2021-11-02
Payer: OTHER MISCELLANEOUS

## 2021-11-02 PROCEDURE — 99024 POSTOP FOLLOW-UP VISIT: CPT

## 2021-11-02 PROCEDURE — 72040 X-RAY EXAM NECK SPINE 2-3 VW: CPT

## 2021-11-02 NOTE — HISTORY OF PRESENT ILLNESS
[___ Weeks Post Op] : [unfilled] weeks post op [de-identified] : s/p ACDF C4-C5 w/ iliac crest autograft [de-identified] : 56 year old female 3 weeks s/p ACDF C4-C5 w/ iliac crest autograft.  Since her last visit she has noted improvements in swallowing.  She says the neck pain is better but she still will have episodes of neck pain when she is out walking.  She has been eating soft foods without issue and is advancing her diet.   She denies radicular pain, difficulty breathing, recent illness, fevers, saddle anesthesia, urinary retention or fecal incontinence. [de-identified] : MSK:\par Cervical Spine: \par Incision well healed\par Circle site incision c/d/i, staples removed and steri-strips applied\par \par NEURO:\par Sensation \par          Left           \par C5     2/2               \par C6     2/2               \par C7     2/2               \par C8     2/2              \par T1     2/2             \par \par          Right         \par C5     2/2               \par C6     2/2               \par C7     2/2               \par C8     2/2              \par T1     2/2      \par \par Motor: \par                                                Left             \par C5 (deltoid abduction)             5/5               \par C6 (biceps flexion)                   5/5                \par C7 (triceps extension)             5/5               \par C8 (finger flexion)                     5/5               \par T1 (interosseous)                     5/5           \par \par                                                Right           \par C5 (deltoid abduction)             5/5               \par C6 (biceps flexion)                   5/5                \par C7 (triceps extension)             5/5               \par C8 (finger flexion)                     5/5               \par T1 (interosseous)                     5/5                     \par \par Sensation \par Left L2  -  2/2            \par Left L3  -  2/2\par Left L4  -  2/2\par Left L5  -  2/2\par Left S1  -  2/2\par \par Right L2  -  2/2            \par Right L3  -  2/2\par Right L4  -  2/2\par Right L5  -  2/2\par Right S1  -  2/2\par \par Motor: \par Left L2 (hip flexion)                            5/5                \par Left L3 (knee extension)                   5/5                \par Left L4 (ankle dorsiflexion)                 5/5                \par Left L5 (long toe extensor)                5/5                \par Left S1 (ankle plantar flexion)           5/5\par \par Right L2 (hip flexion)                            5/5                \par Right L3 (knee extension)                   5/5                \par Right L4 (ankle dorsiflexion)                 5/5                \par Right L5 (long toe extensor)                5/5                \par Right S1 (ankle plantar flexion)           5/5 [de-identified] : I ordered cervical radiographs to evaluate her hardware\par \par Cervical 2 view radiographs obtained in the office today shows no fracture or dislocation.  s/p ACDF C4-C5 with hardware in appropriate alignment [de-identified] : 56 year old female 2 weeks s/p ACDF C4-C5 w/ iliac crest autograft.  [de-identified] : Patient's pain has been improving since surgery.  She is neurologically intact (she has neck pain with movement of her upper extremities but strength remains 5/5). She continues to see improvement with swallowing.  Her posterior neck pain is what bothers her the most.  She says it is better with methocarbamol.  She will continue methocarbamol 750. She was also given a steroid dose pack.  She will continue hard cervical orthosis.  She will avoid heavy lifting twisting or bending.  She will followup in 3 weeks. We discussed red flag symptoms that would require emergent evaluation. She knows to call with any questions or concerns or if her symptoms acutely worsen.

## 2021-11-03 LAB
MONOMERIC PROLACTIN (ICMA)*: 4.48 NG/ML
PERCENT MACROPROLACTIN: 19 %
PROLACTIN, SERUM (ICMA)*: 5.52 NG/ML

## 2021-11-05 RX ORDER — FERROUS SULFATE 300 MG/5ML
300 (60 FE) SOLUTION ORAL DAILY
Qty: 1 | Refills: 0 | Status: DISCONTINUED | COMMUNITY
Start: 2021-11-04 | End: 2021-11-05

## 2021-11-16 ENCOUNTER — APPOINTMENT (OUTPATIENT)
Dept: ORTHOPEDIC SURGERY | Facility: CLINIC | Age: 56
End: 2021-11-16
Payer: MEDICAID

## 2021-11-16 PROCEDURE — 72040 X-RAY EXAM NECK SPINE 2-3 VW: CPT

## 2021-11-16 PROCEDURE — 99024 POSTOP FOLLOW-UP VISIT: CPT

## 2021-11-16 NOTE — HISTORY OF PRESENT ILLNESS
[___ Weeks Post Op] : [unfilled] weeks post op [de-identified] : s/p ACDF C4-C5 w/ iliac crest autograft [de-identified] : 56 year old female 5 weeks s/p ACDF C4-C5 w/ iliac crest autograft.  She had an episode where she felt like metal was breaking in her neck (she says she had her collar on).  She reports being able to feel the screws.  She has been tolerating soft foods but has not progressed to solid foods.  Her pain remains controlled with muscle relaxants. She denies radicular pain, difficulty breathing, recent illness, fevers, saddle anesthesia, urinary retention or fecal incontinence. [de-identified] : MSK:\par Cervical Spine: \par Incision well healed\par [place] site incision incision well healed\par \par NEURO:\par Sensation \par          Left           \par C5     2/2               \par C6     2/2               \par C7     2/2               \par C8     2/2              \par T1     2/2             \par \par          Right         \par C5     2/2               \par C6     2/2               \par C7     2/2               \par C8     2/2              \par T1     2/2      \par \par Motor: \par                                                Left             \par C5 (deltoid abduction)             5/5               \par C6 (biceps flexion)                   5/5                \par C7 (triceps extension)             5/5               \par C8 (finger flexion)                     5/5               \par T1 (interosseous)                     5/5           \par \par                                                Right           \par C5 (deltoid abduction)             5/5               \par C6 (biceps flexion)                   5/5                \par C7 (triceps extension)             5/5               \par C8 (finger flexion)                     5/5               \par T1 (interosseous)                     5/5                     \par \par Sensation \par Left L2  -  2/2            \par Left L3  -  2/2\par Left L4  -  2/2\par Left L5  -  2/2\par Left S1  -  2/2\par \par Right L2  -  2/2            \par Right L3  -  2/2\par Right L4  -  2/2\par Right L5  -  2/2\par Right S1  -  2/2\par \par Motor: \par Left L2 (hip flexion)                            5/5                \par Left L3 (knee extension)                   5/5                \par Left L4 (ankle dorsiflexion)                 5/5                \par Left L5 (long toe extensor)                5/5                \par Left S1 (ankle plantar flexion)           5/5\par \par Right L2 (hip flexion)                            5/5                \par Right L3 (knee extension)                   5/5                \par Right L4 (ankle dorsiflexion)                 5/5                \par Right L5 (long toe extensor)                5/5                \par Right S1 (ankle plantar flexion)           5/5 [de-identified] : I ordered cervical radiographs to evaluate her hardware\par \par Cervical 2 view radiographs obtained in the office today shows no fracture or dislocation.  s/p ACDF C4-C5 with hardware in appropriate alignment. No prevertebral swelling [de-identified] : 56 year old female 5 weeks s/p ACDF C4-C5 w/ iliac crest autograft.  [de-identified] : She had an episode where she felt like metal was breaking in her neck (she says she had her collar on).  She reports being able to feel the screws.  She has been tolerating soft foods but has not progressed to solid foods.  Her pain remains controlled with muscle relaxants.  She is neurologically intact and has no radicular pain.  Her radiographs today show hardware in appropriate position and alignment.  She has no prevertebral swelling on radiographs.  She was given a referral to ENT for further evaluation on her dysphagia. She will continue muscle relaxants as needed. She will continue cervical orthosis. She will followup in 3 weeks. We discussed red flag symptoms that would require emergent evaluation. She knows to call with any questions or concerns or if her symptoms acutely worsen.

## 2021-11-18 ENCOUNTER — APPOINTMENT (OUTPATIENT)
Dept: INTERNAL MEDICINE | Facility: CLINIC | Age: 56
End: 2021-11-18
Payer: MEDICAID

## 2021-11-18 VITALS
OXYGEN SATURATION: 98 % | TEMPERATURE: 98.6 F | HEIGHT: 63 IN | BODY MASS INDEX: 23.39 KG/M2 | WEIGHT: 132 LBS | DIASTOLIC BLOOD PRESSURE: 78 MMHG | SYSTOLIC BLOOD PRESSURE: 113 MMHG

## 2021-11-18 PROCEDURE — 99214 OFFICE O/P EST MOD 30 MIN: CPT | Mod: 25,GC

## 2021-11-19 ENCOUNTER — APPOINTMENT (OUTPATIENT)
Dept: ORTHOPEDIC SURGERY | Facility: CLINIC | Age: 56
End: 2021-11-19

## 2021-11-19 LAB
FOLATE SERPL-MCNC: 11 NG/ML
VIT B12 SERPL-MCNC: 513 PG/ML

## 2021-11-23 ENCOUNTER — APPOINTMENT (OUTPATIENT)
Dept: OTOLARYNGOLOGY | Facility: CLINIC | Age: 56
End: 2021-11-23
Payer: MEDICAID

## 2021-11-23 VITALS
TEMPERATURE: 97.5 F | DIASTOLIC BLOOD PRESSURE: 95 MMHG | WEIGHT: 130 LBS | BODY MASS INDEX: 23.04 KG/M2 | SYSTOLIC BLOOD PRESSURE: 128 MMHG | HEIGHT: 63 IN

## 2021-11-23 DIAGNOSIS — R13.10 DYSPHAGIA, UNSPECIFIED: ICD-10-CM

## 2021-11-23 PROCEDURE — 31575 DIAGNOSTIC LARYNGOSCOPY: CPT

## 2021-11-23 PROCEDURE — 99204 OFFICE O/P NEW MOD 45 MIN: CPT | Mod: 25

## 2021-11-23 NOTE — PHYSICAL EXAM
[FreeTextEntry1] : voice strong [de-identified] : soft, flat, well healing left anterolateral incision, dry and intact [Midline] : trachea located in midline position [Laryngoscopy Performed] : laryngoscopy was performed, see procedure section for findings [Normal] : no rashes

## 2021-11-23 NOTE — PROCEDURE
[FreeTextEntry3] : Fiberoptic Laryngoscopy:\par upper airway widely patent\par posterior pharyngeal wall flat, no bulge, no foreign body, no metal, mucosa intact, no lesions\par TVF symmetric and mobile\par no masses/lesions

## 2021-11-23 NOTE — HISTORY OF PRESENT ILLNESS
[de-identified] : 56F here for initial evaluation.\par \par Ever since undergoing cervical spine surgery 10/5/21 (ACDF C4-C5), she has had difficulty swallowing foods with sensation they get stuck associated w pain. She also feels her saliva gets stuck and she has to make effortful swallow to have it go down. She has not progressed to solid foods. There is no difficulty breathing or talking and no voice changes. No neck swelling.\par \par ROS otherwise unremarkable.\par \par

## 2021-11-23 NOTE — ASSESSMENT
[FreeTextEntry1] : 56F here for initial evaluation. Ever since undergoing cervical spine surgery 10/5/21 (ACDF C4-C5), she has had difficulty swallowing foods with sensation they get stuck associated w pain. She also feels her saliva gets stuck and she has to make effortful swallow to have it go down. She has not yet progressed to solid foods. There is no difficulty breathing or talking and there are no voice changes or neck swelling. On exam, the neck is flat with a well healing left anterolateral incision. The rest of the complete and comprehensive head and neck exam, including fiberoptic laryngoscopy, is unremarkable.\par Head and neck exam is unremarkable today, as above, with expected postsurgical changes and widely patent upper airway. Would recommend continued f/u with spine surgeon and should dysphagia persist, to see SLP for evaluation.\par

## 2021-11-23 NOTE — CONSULT LETTER
[Dear  ___] : Dear  [unfilled], [Courtesy Letter:] : I had the pleasure of seeing your patient, [unfilled], in my office today. [Consult Closing:] : Thank you very much for allowing me to participate in the care of this patient.  If you have any questions, please do not hesitate to contact me. [Sincerely,] : Sincerely, [FreeTextEntry3] : Erick Prater MD\par Department of Otolaryngology - Head and Neck Surgery\par [facility]

## 2021-11-29 ENCOUNTER — APPOINTMENT (OUTPATIENT)
Dept: OPHTHALMOLOGY | Facility: CLINIC | Age: 56
End: 2021-11-29
Payer: MEDICAID

## 2021-11-29 ENCOUNTER — NON-APPOINTMENT (OUTPATIENT)
Age: 56
End: 2021-11-29

## 2021-11-29 PROCEDURE — 92083 EXTENDED VISUAL FIELD XM: CPT

## 2021-11-29 PROCEDURE — 92004 COMPRE OPH EXAM NEW PT 1/>: CPT

## 2021-11-29 PROCEDURE — 92014 COMPRE OPH EXAM EST PT 1/>: CPT

## 2021-11-29 PROCEDURE — 92134 CPTRZ OPH DX IMG PST SGM RTA: CPT

## 2021-12-10 ENCOUNTER — APPOINTMENT (OUTPATIENT)
Dept: ORTHOPEDIC SURGERY | Facility: CLINIC | Age: 56
End: 2021-12-10
Payer: OTHER MISCELLANEOUS

## 2021-12-10 PROCEDURE — 99024 POSTOP FOLLOW-UP VISIT: CPT

## 2021-12-10 PROCEDURE — 72040 X-RAY EXAM NECK SPINE 2-3 VW: CPT

## 2021-12-10 NOTE — HISTORY OF PRESENT ILLNESS
[___ Months Post Op] : [unfilled] months post op [de-identified] : s/p ACDF C4-C5 w/ iliac crest autograft [de-identified] : 56 year old female 2 months s/p ACDF C4-C5 w/ iliac crest autograft. She reports continued neck pain.  She saw Dr. Prater from ENT who evaluated her for dysphagia.  She has not been to SLP yet.  She takes methocarbamol and gabapentin with some relief. She denies radicular pain, difficulty breathing, recent illness, fevers, saddle anesthesia, urinary retention or fecal incontinence. [de-identified] : MSK:\par Cervical Spine: \par Incision well healed\par [place] site incision incision well healed\par \par NEURO:\par Sensation \par          Left           \par C5     2/2               \par C6     2/2               \par C7     2/2               \par C8     2/2              \par T1     2/2             \par \par          Right         \par C5     2/2               \par C6     2/2               \par C7     2/2               \par C8     2/2              \par T1     2/2      \par \par Motor: \par                                                Left             \par C5 (deltoid abduction)             5/5               \par C6 (biceps flexion)                   5/5                \par C7 (triceps extension)             5/5               \par C8 (finger flexion)                     5/5               \par T1 (interosseous)                     5/5           \par \par                                                Right           \par C5 (deltoid abduction)             5/5               \par C6 (biceps flexion)                   5/5                \par C7 (triceps extension)             5/5               \par C8 (finger flexion)                     5/5               \par T1 (interosseous)                     5/5                     \par \par Sensation \par Left L2  -  2/2            \par Left L3  -  2/2\par Left L4  -  2/2\par Left L5  -  2/2\par Left S1  -  2/2\par \par Right L2  -  2/2            \par Right L3  -  2/2\par Right L4  -  2/2\par Right L5  -  2/2\par Right S1  -  2/2\par \par Motor: \par Left L2 (hip flexion)                            5/5                \par Left L3 (knee extension)                   5/5                \par Left L4 (ankle dorsiflexion)                 5/5                \par Left L5 (long toe extensor)                5/5                \par Left S1 (ankle plantar flexion)           5/5\par \par Right L2 (hip flexion)                            5/5                \par Right L3 (knee extension)                   5/5                \par Right L4 (ankle dorsiflexion)                 5/5                \par Right L5 (long toe extensor)                5/5                \par Right S1 (ankle plantar flexion)           5/5 [de-identified] : I ordered cervical radiographs to evaluate her hardware\par \par Cervical 2 view radiographs obtained in the office today shows no fracture or dislocation.  s/p ACDF C4-C5 with hardware in appropriate alignment. No prevertebral swelling [de-identified] : 56 year old female 2 months s/p ACDF C4-C5 w/ iliac crest autograft.  [de-identified] : Patient continues to have neck pain.  Her radiographs show hardware in appropriate position and alignment.  She can discontinue the cervical orthosis.  She will continue methocarbamol and gabapentin. She is neurologically intact. She continues to have dysphagia to solid foods.  She was seen by Dr. Prater from ENT.  She will start SLP.  She will followup in 2 months.  We discussed red flag symptoms that would require emergent evaluation. She knows to call with any questions or concerns or if her symptoms acutely worsen.

## 2022-01-02 ENCOUNTER — EMERGENCY (EMERGENCY)
Facility: HOSPITAL | Age: 57
LOS: 1 days | Discharge: HOME | End: 2022-01-02
Attending: EMERGENCY MEDICINE | Admitting: EMERGENCY MEDICINE
Payer: OTHER MISCELLANEOUS

## 2022-01-02 VITALS
HEART RATE: 90 BPM | OXYGEN SATURATION: 98 % | TEMPERATURE: 98 F | SYSTOLIC BLOOD PRESSURE: 120 MMHG | RESPIRATION RATE: 18 BRPM | DIASTOLIC BLOOD PRESSURE: 84 MMHG

## 2022-01-02 VITALS
TEMPERATURE: 98 F | SYSTOLIC BLOOD PRESSURE: 127 MMHG | DIASTOLIC BLOOD PRESSURE: 81 MMHG | RESPIRATION RATE: 18 BRPM | HEIGHT: 63 IN | WEIGHT: 125 LBS | OXYGEN SATURATION: 98 % | HEART RATE: 89 BPM

## 2022-01-02 DIAGNOSIS — Z88.0 ALLERGY STATUS TO PENICILLIN: ICD-10-CM

## 2022-01-02 DIAGNOSIS — M25.512 PAIN IN LEFT SHOULDER: ICD-10-CM

## 2022-01-02 DIAGNOSIS — Z91.013 ALLERGY TO SEAFOOD: ICD-10-CM

## 2022-01-02 DIAGNOSIS — G89.29 OTHER CHRONIC PAIN: ICD-10-CM

## 2022-01-02 DIAGNOSIS — M54.2 CERVICALGIA: ICD-10-CM

## 2022-01-02 DIAGNOSIS — Z88.6 ALLERGY STATUS TO ANALGESIC AGENT: ICD-10-CM

## 2022-01-02 PROCEDURE — 99284 EMERGENCY DEPT VISIT MOD MDM: CPT | Mod: 25

## 2022-01-02 PROCEDURE — 99285 EMERGENCY DEPT VISIT HI MDM: CPT

## 2022-01-02 PROCEDURE — 73200 CT UPPER EXTREMITY W/O DYE: CPT | Mod: MA

## 2022-01-02 PROCEDURE — 72125 CT NECK SPINE W/O DYE: CPT | Mod: MA

## 2022-01-02 PROCEDURE — 72125 CT NECK SPINE W/O DYE: CPT | Mod: 26,MA

## 2022-01-02 PROCEDURE — 73200 CT UPPER EXTREMITY W/O DYE: CPT | Mod: 26,LT,MA

## 2022-01-02 NOTE — ED PROVIDER NOTE - NSFOLLOWUPINSTRUCTIONS_ED_ALL_ED_FT
Attending Physician Attestation Note:    Resident Physician: Georgie Hussein DO    Pt is a 18 year old male here for the following concerns:  Chief Complaint   Patient presents with   • Physical   • STD     and even want blood work up     Visit Vitals  /60 (BP Location: RUE - Right upper extremity, Patient Position: Sitting, Cuff Size: Regular)   Pulse 69   Temp 97.8 °F (36.6 °C) (Tympanic)   Resp 20   Ht 6' (1.829 m)   Wt 77.5 kg   BMI 23.17 kg/m²       Impression and Plan:  Concerned for STI  Sexually active and states penile discharge but no lesions   Mild dysuria occasionally.   Urine and blood sti screening today    Not using condoms. VS reassuring     Due for a physical as well.     The patient was seen by me as well and I agree with the history, exam and plan as noted by the resident physician.   Please see resident note for details.    a procedure was not performed today.      Tara Colindres MD  12/22/2020             Please follow up closely with your doctors, and get MRI of your spine and shoulder as discussed with your outpatient doctors.     Your imaging today showed no new fractures and the hardware looks okay

## 2022-01-02 NOTE — ED PROVIDER NOTE - PATIENT PORTAL LINK FT
You can access the FollowMyHealth Patient Portal offered by [facility] by registering at the following website: [email or web address]. By joining [facility]’s FollowMyHealth portal, you will also be able to view your health information using other applications (apps) compatible with our system.

## 2022-01-02 NOTE — ED PROVIDER NOTE - OBJECTIVE STATEMENT
5 y/o female c/o left shoulder, and arm pain and arm " feeling heavy and numbed". pt states "this is happening for about 2 months but this morning the pain was unbearable " relates pmh compression fx to C4-C5, following w/ Dr. Lewis. States at time of injury she also injured her L shoulder but only had MRI of the spine, no advanced imaging of shoulder, and pt concerned that her doctors are missing a second injury.. Pt able to  move extremity but c/o pain when doing so, states ROM has been limited since the injury. Denies current numbness/tingling. No urinary or fecal incontinence or retention. Had a moment of dizziness during triage, pt states when being moved around for vitals she moved her shoulder too much and it caused severe pain, now at rest in stretcher, feels back to baseline again.  · Presenting Symptoms: PAIN  · Timing: constant  · Duration: month(s)  2  · Quality: numb, sharp, stabbing

## 2022-01-02 NOTE — ED PROVIDER NOTE - PHYSICAL EXAMINATION
CONSTITUTIONAL: Well-appearing; in no apparent distress.   HEAD: Normocephalic; atraumatic.   EYES:  conjunctiva and sclera clear  ENT: normal nose; no rhinorrhea; normal pharynx with no erythema or lesions.   NECK: Supple; non-tender over spine  CARDIOVASCULAR: Normal S1, S2; no murmurs, rubs, or gallops. Regular rate and rhythm.   RESPIRATORY: Breathing easily; breath sounds clear and equal bilaterally; no wheezes, rhonchi, or rales.  GI: Soft; non-distended; non-tender; no palpable organomegaly.   EXT: No cyanosis or edema; N/V intact, good pulses. L shoulder w/ limited ROM, diffusely tender over L a/c joint, trapezius, SCM, and diffuse neck. no deformities seen or breaks in skin.  SKIN: Normal for age and race; warm; dry; good turgor; no apparent lesions or rash.   NEURO: A & O x 3; face symmetric; grossly unremarkable.   PSYCHOLOGICAL: The patient’s mood and manner are appropriate.

## 2022-01-02 NOTE — ED ADULT TRIAGE NOTE - CHIEF COMPLAINT QUOTE
Pt c/o left arm pain and numbness ongoing for x2 months since c4-c5 compression related surgery. Pt states she is supposed to get MRI but woke up this morning with excruciating pain that she couldn't tolerate. No dysarthria or facial droop noted. Pt c/o left arm pain and numbness ongoing for x2 months since c4-c5 compression related surgery. Pt states she is supposed to get MRI but woke up this morning with excruciating pain that she couldn't tolerate. No dysarthria or facial droop noted. Pt had acute onset dizziness in triage chair.

## 2022-01-02 NOTE — ED ADULT NURSE NOTE - OBJECTIVE STATEMENT
55 y/o female c/o left shoulder, and arm pain and arm " feeling heavy and numbed". pt states "this is happening for about 2 months but this morning the pain was unbearable " relates pmh compression fx to C4-C5. Pt able to  move extremity but c/o pain when doing so, cap refill less than 2 sec.

## 2022-01-02 NOTE — ED PROVIDER NOTE - CLINICAL SUMMARY MEDICAL DECISION MAKING FREE TEXT BOX
here w/ contined pain from accident, imaging done to r/o hardware malposition and other fx and neg. pt to f/u with her spine dr and neuro. DC home in NAD with strict return precautions given. no concern at this time for intraspinal pathology necessitating emergent MRI

## 2022-01-02 NOTE — ED ADULT NURSE NOTE - CHIEF COMPLAINT QUOTE
Pt c/o left arm pain and numbness ongoing for x2 months since c4-c5 compression related surgery. Pt states she is supposed to get MRI but woke up this morning with excruciating pain that she couldn't tolerate. No dysarthria or facial droop noted. Pt had acute onset dizziness in triage chair.

## 2022-01-04 ENCOUNTER — APPOINTMENT (OUTPATIENT)
Dept: NEPHROLOGY | Facility: CLINIC | Age: 57
End: 2022-01-04
Payer: MEDICAID

## 2022-01-04 VITALS
BODY MASS INDEX: 23.04 KG/M2 | SYSTOLIC BLOOD PRESSURE: 122 MMHG | DIASTOLIC BLOOD PRESSURE: 78 MMHG | HEART RATE: 76 BPM | WEIGHT: 130 LBS | HEIGHT: 63 IN

## 2022-01-04 DIAGNOSIS — Z80.0 FAMILY HISTORY OF MALIGNANT NEOPLASM OF DIGESTIVE ORGANS: ICD-10-CM

## 2022-01-04 DIAGNOSIS — Z83.3 FAMILY HISTORY OF DIABETES MELLITUS: ICD-10-CM

## 2022-01-04 PROBLEM — F41.9 ANXIETY DISORDER, UNSPECIFIED: Chronic | Status: ACTIVE | Noted: 2022-01-02

## 2022-01-04 PROCEDURE — 99214 OFFICE O/P EST MOD 30 MIN: CPT

## 2022-01-04 NOTE — HISTORY OF PRESENT ILLNESS
[FreeTextEntry1] : 56-year-old woman referred here 1 year ago with very modest proteinuria by history in the setting of SLE.  However urinalysis here and urine microalbumin were normal.  She has been on Plaquenil for 8 to 9 years.  She has exhibited Raynaud's phenomenon, photosensitivity, and diffuse polyarthralgias, with an BAILYE positive at 1-1 60 but normal double-stranded DNA.  2 weeks ago, her hemoglobin was .1,wbace5.4, creatinine 0.53, GFR above 100, serum albumin 4.4, sed rate 7, CRP normal, double-stranded DNA normal, C3 and C4 complement normal, urinalysis and U ACR normal.  Her main complaint is of pain in the neck and left shoulder, related to cervical disc fusion recently, as well as a diagnosis of left frozen shoulder.  She is in chronic pain and sees pain management.  She is not taking NSAIDs.  Her rheumatologist is Dr. Deya Alan, on [address] ([facility]).  She is experiencing recent mouth ulcers, and has been placed on azathioprine 100 mg daily.

## 2022-01-04 NOTE — CONSULT LETTER
[Dear  ___] : Dear  [unfilled], [Consult Letter:] : I had the pleasure of evaluating your patient, [unfilled]. [Please see my note below.] : Please see my note below. [Consult Closing:] : Thank you very much for allowing me to participate in the care of this patient.  If you have any questions, please do not hesitate to contact me. [Sincerely,] : Sincerely, [FreeTextEntry2] : Dr Deya Alan - rheum [FreeTextEntry1] : 56-year-old woman with a history of SLE which has not yet affected the kidneys -she has no proteinuria even by U ACR, inactive urine sediment, normal creatinine and GFR, normal BP.  Inflammatory markers have been normal.  She remains on azathioprine and Plaquenil.  Her symptoms are largely musculoskeletal at this time, seemingly related to recent cervical spine fusion and frozen left shoulder.  I have reassured her that her kidney function is normal and suggested she return in 12 months.  She will follow up regularly with her rheumatologist, Dr. Alan.  [FreeTextEntry3] : Sincerely, \par \par Mike Barksdale MD, FACP

## 2022-01-05 ENCOUNTER — APPOINTMENT (OUTPATIENT)
Dept: NEUROLOGY | Facility: CLINIC | Age: 57
End: 2022-01-05
Payer: MEDICAID

## 2022-01-05 ENCOUNTER — NON-APPOINTMENT (OUTPATIENT)
Age: 57
End: 2022-01-05

## 2022-01-05 PROCEDURE — 99213 OFFICE O/P EST LOW 20 MIN: CPT | Mod: 95

## 2022-01-05 NOTE — ASSESSMENT
[FreeTextEntry1] : Continue amyt 25 mg qday\par To see ortho for shoulder and for neck pain\par Fup 3 months w me

## 2022-01-05 NOTE — HISTORY OF PRESENT ILLNESS
[FreeTextEntry1] : Follow up\par \par PMHx\par Pt was referred for evaluation of headaches. She was previously seen by Dr. Mead and Dr Amaya\par \par From Dr. Mead's notes:\par 54 yo W HA. hx of \par Her main complaint is pain throughout her body, although she also endorses weakness and numbness\par She reports history of pituitary tumor, fibromyalgia, carpal tunnel syndrome, migraine headaches. \par She is on cymbalta, prednisone, sertraline\par She has seen a neuro-ophthalmologist for visual problems, possibly related to plaquenil, but also states she cannot find why her vision is getting worse. \par ESR 6, , RF / CCP / SSA/B negative, B12/folate normal\par MRI brain 2/2017 unremarkable\par \par \par Pt states that headaches started more than 7 years ago. HAs are about 4 times a week. HAs described as sharp, frontal, pressure like (like "little needles"), at times associated with sensitivity to light and noise. Pt failed Topamax and Magnesium. Pt states that she needed to reduce the hours of work due to headaches. She states that she can not tolerate NSAIDs. She states that she is not taking any meds for acute headaches, but tries to lie down and use cold compresses. She reports Lactorrhea; the results of recent MRI are not available. 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 \par  \par Meds for Migraine\par TOP\par Celebrex\par Neurontin\par Imitrex\par \par Never took:\par \par Nortriptyline\par Propranolol\par Acido Valproico\par \par HPI\par \par Had ACDF C4-5 surgery at  in Oct 21. No complications but she has now a lot of pain in the neck and left arm. \par She is waiting to see another ortho for Left shoulder. \par Amitrip was increased to 25 mg recently.\par Was in ED. \par \par \par

## 2022-01-05 NOTE — PHYSICAL EXAM
[Person] : oriented to person [Place] : oriented to place [Time] : oriented to time [Short Term Intact] : short term memory intact [Span Intact] : the attention span was normal [Naming Objects] : no difficulty naming common objects [Fluency] : fluency intact [Current Events] : adequate knowledge of current events

## 2022-01-26 ENCOUNTER — APPOINTMENT (OUTPATIENT)
Dept: PULMONOLOGY | Facility: CLINIC | Age: 57
End: 2022-01-26
Payer: MEDICAID

## 2022-01-26 VITALS
OXYGEN SATURATION: 97 % | SYSTOLIC BLOOD PRESSURE: 110 MMHG | DIASTOLIC BLOOD PRESSURE: 84 MMHG | TEMPERATURE: 97.2 F | BODY MASS INDEX: 23.04 KG/M2 | WEIGHT: 130 LBS | HEIGHT: 63 IN | HEART RATE: 94 BPM

## 2022-01-26 PROCEDURE — 99213 OFFICE O/P EST LOW 20 MIN: CPT

## 2022-01-26 NOTE — CONSULT LETTER
[Dear  ___] : Dear  [unfilled], [Courtesy Letter:] : I had the pleasure of seeing your patient, [unfilled], in my office today. [Please see my note below.] : Please see my note below. [Consult Closing:] : Thank you very much for allowing me to participate in the care of this patient.  If you have any questions, please do not hesitate to contact me. [Sincerely,] : Sincerely, [FreeTextEntry2] : Anton Kc MD\par [address] \[place] [address]\par  [FreeTextEntry3] : Esme Rodriguez MD, FCCP\par

## 2022-01-26 NOTE — ASSESSMENT
[FreeTextEntry1] : Data reviewed:\par \par PSG 5/2019: AHI 5.9 / titrated to 9 cm H2O / compliance report showed median pressure 6.3\par HST 5/2021: AHI 16, no hypoxia\par \par PA/lat CXR [facility] 6/2020 personally reviewed : clear lungs\par CT chest [facility] 10/21/20 personally reviewed : unremarkable\par CT chest [facility] 1/2021 personally reviewed : clear lungs\par CT shoulder [facility] 1/2/22 personally reviewed : calcified granuloma LLL\par \par PFT [facility] 10/9/20: FVC 1.95L (63%), FEV1 1.66L (67%), TLC 3.01L (66%), DLCO 13.2 (62%)\par PFT 85 1/14/21: FVC 1.77L (60%), FEV1 1.52L (64%), TLC 2.91L (66%), DLCO 13.2 (64%) (61in)\par PFT [facility] 4/13/21: FVC 1.93L (61%), FEV1 1.66L (66%), TLC 3.18L (67%), DLCO 12.76 (59%) (63in)\par PFT 85 8/12/21: FVC 1.95L (66%), FEV1 1.63L (70%), TLC 3.14L (71%), DLCO 14.83 (72%) (61in)\par \par Impression:\par Calcified granuloma\par Dyspnea on exertion w SLE, restricted PFT, and normal lung parenchyma\par Mod NEVIN on CPAP\par \par Plan:\par I showed her the calcified granuloma and showed her that it was also there in 10/2020, and I explained that this finding is benign and permanent, and requires no evaluation or follow up.\par Repeating PFT in April - already scheduled.\par Cont CPAP for mod NEVIN. \par Get Covid booster absolutely ASAP.

## 2022-01-26 NOTE — HISTORY OF PRESENT ILLNESS
[TextBox_4] : 2020: Asked to evaluate patient by Dr Kc for dyspnea. States that in May she was in the shower and she passed out and was out for 2 hours. Came to, felt dizzy and anxious, vomited blood. Called EMS, vitals were fine so she stayed home due to Covid. She was seen at [facility] and the feeling was that she had sleep deprivation. Her   in  and her only son  in the  in 2018 and since then she struggles w depression and can't sleep. She is also unable to use her CPAP. But none of that relates to her visit today. She complains of dyspnea x months. She is dyspneic on 1.5 blocks. No history of lung disease. Never smoker. No cough or sputum. SLE treated by Dr Law with HCQ, formerly on prednisone but no longer. She saw a cardiologist last week, Dr Irizarry, and doesn't know the results of that evaluation as yet.\par \par 20: In the interim underwent Covid swab for PFT and tested positive, so follow up was delayed by isolation and wait for PFT. She stayed home, felt somewhat more dyspneic, had low grade temp 100.9, anosmia for a month, no GI symptoms. She feels back to her usual self now. Saw Dr Irizarry last week, told everything was normal. Remains dyspneic, has a sensation of her breath stopping in her mouth, can't sleep, and feels numb all over her body at night when she tries to go to sleep. Not using CPAP.\par \par 21: Went to [facility] ED last week for chest pain, told there was some abnormality on a CXR, had CTA w contrast reaction, was told this CT was normal. 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 Had some moderately elevated BPs afterward. She wants a sleep apnea test. This is because she woke from sleep last week feeling dyspneic. This happens often. Breathing is good and bad, some days fine, some days horrible. Rheum is Dr Deya Alan.\par \par 21: Her rheumatologist and psychiatrist added Cymbalta for her pain and anxiety/depression, and this helped w the pain but not the anxiety/depression. She may be given a holiday from the [facility]. Has good and bad days, same w breathing, some days good and some not as good. Sleep test had been denied but she brings outside sleep records. She was found to have mild NEVIN in 2019 and used autoPAP and felt that her symptoms improved. She reports ongoing feeling of choking at night, waking up gasping every night, does wake up with dry mouth. Falls asleep watching tv. Has gained 10-12 lbs since her last test and is on potentially sedating meds that she was not on 2 years ago. She also complains of her L arm being numb at night. Had both Covid vaccines.\par \par 21: Using CPAP, shows me report w AHI 1.4 and feels much better. Complaining of pain in the back of her neck. Breathing is ok. Spending time caring for her father who is very ill (dying?). Her CPAP doesn't work there, only at home. Is this because he doesn't have wifi?\par \par 22: Walks in stating her ortho wanted me to see her because of findings on shoulder imaging - has shoulder MRI report which only reports shoulder findings, but shoulder XR w LLL 4mm nodule. . She had in the interim called because of a CT shoulder in our ED and we had discussed that it showed a calcified granuloma. \par

## 2022-02-02 ENCOUNTER — APPOINTMENT (OUTPATIENT)
Dept: OPHTHALMOLOGY | Facility: CLINIC | Age: 57
End: 2022-02-02

## 2022-02-02 ENCOUNTER — NON-APPOINTMENT (OUTPATIENT)
Age: 57
End: 2022-02-02

## 2022-02-03 ENCOUNTER — APPOINTMENT (OUTPATIENT)
Dept: INTERNAL MEDICINE | Facility: CLINIC | Age: 57
End: 2022-02-03
Payer: MEDICAID

## 2022-02-03 ENCOUNTER — APPOINTMENT (OUTPATIENT)
Dept: RHEUMATOLOGY | Facility: CLINIC | Age: 57
End: 2022-02-03

## 2022-02-03 VITALS
OXYGEN SATURATION: 97 % | BODY MASS INDEX: 23.04 KG/M2 | WEIGHT: 130 LBS | HEART RATE: 68 BPM | TEMPERATURE: 98.8 F | RESPIRATION RATE: 16 BRPM | DIASTOLIC BLOOD PRESSURE: 92 MMHG | HEIGHT: 63 IN | SYSTOLIC BLOOD PRESSURE: 121 MMHG

## 2022-02-03 DIAGNOSIS — Z12.39 ENCOUNTER FOR OTHER SCREENING FOR MALIGNANT NEOPLASM OF BREAST: ICD-10-CM

## 2022-02-03 PROCEDURE — 99214 OFFICE O/P EST MOD 30 MIN: CPT

## 2022-02-09 ENCOUNTER — APPOINTMENT (OUTPATIENT)
Dept: ORTHOPEDIC SURGERY | Facility: CLINIC | Age: 57
End: 2022-02-09
Payer: OTHER MISCELLANEOUS

## 2022-02-09 PROCEDURE — 72040 X-RAY EXAM NECK SPINE 2-3 VW: CPT

## 2022-02-09 PROCEDURE — 99214 OFFICE O/P EST MOD 30 MIN: CPT

## 2022-02-09 PROCEDURE — 99072 ADDL SUPL MATRL&STAF TM PHE: CPT

## 2022-02-09 NOTE — ASSESSMENT
[FreeTextEntry1] : 56 year old female followup 4 months s/p C4-C5 ACDF with iliac crest autograft for cervical cord compression.  Since her last appointment she has had improvements in pain and range of motion of her neck.  She also has improvement in dysphagia and is tolerating solid foods.  She denies radicular pain and is at her neurologic baseline.  She is being treated for left shoulder adhesive capsulitis.  She will followup with Dr. Pacheco for her shoulder.  She will continue physical therapy.  She was given gabapentin prescription. She can continue collar for comfort. She remains out of work 100% disabled.  She will followup in 3 months. We discussed red flag symptoms that would require emergent evaluation. She knows to call with any questions or concerns or if her symptoms acutely worsen.

## 2022-02-09 NOTE — PHYSICAL EXAM
[de-identified] : General: No acute distress, conversant, well-nourished.\par Head: Normocephalic, atraumatic\par Neck: trachea midline, FROM\par Heart: normotensive and normal rate and rhythm\par Lungs: No labored breathing\par Skin: No abrasions, no rashes, no edema\par Psych: Alert and oriented to person, place and time\par Extremities: no peripheral edema or digital cyanosis\par Gait: Uses cane.\par Vascular: warm and well perfused distally, palpable distal pulses\par \par MSK:\par Cervical Spine: \par Incision well healed\par [place] site incision c/d/i, staples removed and steri-strips applied\par \par NEURO:\par Sensation \par  Left \par C5 2/2 \par C6 2/2 \par C7 2/2 \par C8 2/2 \par T1 2/2 \par \par  Right \par C5 2/2 \par C6 2/2 \par C7 2/2 \par C8 2/2 \par T1 2/2 \par \par Motor: \par  Left \par C5 (deltoid abduction) 5/5 \par C6 (biceps flexion) 5/5 \par C7 (triceps extension) 5/5 \par C8 (finger flexion) 5/5 \par T1 (interosseous) 5/5 \par \par  Right \par C5 (deltoid abduction) 5/5 \par C6 (biceps flexion) 5/5 \par C7 (triceps extension) 5/5 \par C8 (finger flexion) 5/5 \par T1 (interosseous) 5/5 \par \par Sensation \par Left L2 - 2/2 \par Left L3 - 2/2\par Left L4 - 2/2\par Left L5 - 2/2\par Left S1 - 2/2\par \par Right L2 - 2/2 \par Right L3 - 2/2\par Right L4 - 2/2\par Right L5 - 2/2\par Right S1 - 2/2\par \par Motor: \par Left L2 (hip flexion) 5/5 \par Left L3 (knee extension) 5/5 \par Left L4 (ankle dorsiflexion) 5/5 \par Left L5 (long toe extensor) 5/5 \par Left S1 (ankle plantar flexion) 5/5\par \par Right L2 (hip flexion) 5/5 \par Right L3 (knee extension) 5/5 \par Right L4 (ankle dorsiflexion) 5/5 \par Right L5 (long toe extensor) 5/5 \par Right S1 (ankle plantar flexion) 5/5.  [de-identified] : I ordered cervical radiographs to evaluate her hardware\par \par Cervical 2 view radiographs obtained in the office today shows no fracture or dislocation. s/p ACDF C4-C5 with hardware in appropriate alignment.

## 2022-02-09 NOTE — HISTORY OF PRESENT ILLNESS
[de-identified] : 56 year old female followup 4 months s/p C4-C5 ACDF with iliac crest autograft for cervical cord compression.  Since her last appointment she has had improvements in pain and range of motion of her neck.  She is only wearing the collar when taking public transportation due to concerns about abrupt stopping of vehicles.  She had a cervical injection which helped.  She does continue to feel her head is very heavy and she reports slouching forward due to fatigue of her neck and upper back.  She has no pain radiating to her left hand.  She has left shoulder pain and is being treated for a left shoulder adhesive capsulitis.  She has been doing physical therapy and is planning to have a steroid injection for her left shoulder.  She is followed by Dr. Pacheco for her shoulder.  She has also seen improvement in her dysphagia and is now tolerating solid foods.  She denies radicular pain, difficulty breathing, recent illness, fevers, saddle anesthesia, urinary retention or fecal incontinence.

## 2022-04-04 ENCOUNTER — NON-APPOINTMENT (OUTPATIENT)
Age: 57
End: 2022-04-04

## 2022-04-04 ENCOUNTER — APPOINTMENT (OUTPATIENT)
Dept: OPHTHALMOLOGY | Facility: CLINIC | Age: 57
End: 2022-04-04
Payer: MEDICAID

## 2022-04-04 PROCEDURE — 92014 COMPRE OPH EXAM EST PT 1/>: CPT

## 2022-04-04 PROCEDURE — 92133 CPTRZD OPH DX IMG PST SGM ON: CPT

## 2022-04-04 PROCEDURE — 92083 EXTENDED VISUAL FIELD XM: CPT

## 2022-04-05 ENCOUNTER — APPOINTMENT (OUTPATIENT)
Dept: NEUROLOGY | Facility: CLINIC | Age: 57
End: 2022-04-05
Payer: MEDICAID

## 2022-04-05 VITALS
SYSTOLIC BLOOD PRESSURE: 115 MMHG | HEART RATE: 71 BPM | TEMPERATURE: 97.3 F | BODY MASS INDEX: 23.04 KG/M2 | OXYGEN SATURATION: 100 % | DIASTOLIC BLOOD PRESSURE: 74 MMHG | WEIGHT: 130 LBS | HEIGHT: 63 IN

## 2022-04-05 PROCEDURE — 99214 OFFICE O/P EST MOD 30 MIN: CPT

## 2022-04-05 NOTE — HISTORY OF PRESENT ILLNESS
[FreeTextEntry1] : Follow up\par \par PMHx\par Pt was referred for evaluation of headaches. She was previously seen by Dr. Mead and Dr Amaya\par \par From Dr. Mead's notes:\par 54 yo W HA. hx of \par Her main complaint is pain throughout her body, although she also endorses weakness and numbness\par She reports history of pituitary tumor, fibromyalgia, carpal tunnel syndrome, migraine headaches. \par She is on cymbalta, prednisone, sertraline\par She has seen a neuro-ophthalmologist for visual problems, possibly related to plaquenil, but also states she cannot find why her vision is getting worse. \par ESR 6, , RF / CCP / SSA/B negative, B12/folate normal\par MRI brain 2/2017 unremarkable\par \par \par Pt states that headaches started more than 7 years ago. HAs are about 4 times a week. HAs described as sharp, frontal, pressure like (like "little needles"), at times associated with sensitivity to light and noise. Pt failed Topamax and Magnesium. Pt states that she needed to reduce the hours of work due to headaches. She states that she can not tolerate NSAIDs. She states that she is not taking any meds for acute headaches, but tries to lie down and use cold compresses. She reports Lactorrhea; the results of recent MRI are not available. 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 \par  \par Meds for Migraine\par TOP\par Celebrex\par Neurontin\par Imitrex\par \par Never took:\par \par Nortriptyline\par Propranolol\par Acido Valproico\par \par HPI\par \par Amitrip was increased to 25 mg in January and it did help a bit. .\par Not sleeping well\par neck pain not better, \par Doing PT for neck\par Ophthal shows binasal visual defect by Dr. Earl \par \par \par

## 2022-04-05 NOTE — PHYSICAL EXAM
[Person] : oriented to person [Place] : oriented to place [Time] : oriented to time [Short Term Intact] : short term memory intact [Span Intact] : the attention span was normal [Naming Objects] : no difficulty naming common objects [Fluency] : fluency intact [Current Events] : adequate knowledge of current events [Cranial Nerves Optic (II)] : visual acuity intact bilaterally,  visual fields full to confrontation, pupils equal round and reactive to light [Cranial Nerves Oculomotor (III)] : extraocular motion intact [Motor Tone] : muscle tone was normal in all four extremities [Motor Handedness Right-Handed] : the patient is right hand dominant [1+] : Patella left 1+

## 2022-04-11 ENCOUNTER — RESULT REVIEW (OUTPATIENT)
Age: 57
End: 2022-04-11

## 2022-04-12 ENCOUNTER — APPOINTMENT (OUTPATIENT)
Dept: PULMONOLOGY | Facility: CLINIC | Age: 57
End: 2022-04-12
Payer: MEDICAID

## 2022-04-12 VITALS
TEMPERATURE: 98.2 F | BODY MASS INDEX: 23.04 KG/M2 | DIASTOLIC BLOOD PRESSURE: 66 MMHG | HEIGHT: 63 IN | OXYGEN SATURATION: 98 % | WEIGHT: 130 LBS | SYSTOLIC BLOOD PRESSURE: 100 MMHG | HEART RATE: 66 BPM

## 2022-04-12 DIAGNOSIS — J84.10 PULMONARY FIBROSIS, UNSPECIFIED: ICD-10-CM

## 2022-04-12 PROCEDURE — 94727 GAS DIL/WSHOT DETER LNG VOL: CPT

## 2022-04-12 PROCEDURE — 99214 OFFICE O/P EST MOD 30 MIN: CPT | Mod: 25

## 2022-04-12 PROCEDURE — 94060 EVALUATION OF WHEEZING: CPT

## 2022-04-12 PROCEDURE — 94729 DIFFUSING CAPACITY: CPT

## 2022-04-12 NOTE — HISTORY OF PRESENT ILLNESS
[TextBox_4] : 2020: Asked to evaluate patient by Dr Kc for dyspnea. States that in May she was in the shower and she passed out and was out for 2 hours. Came to, felt dizzy and anxious, vomited blood. Called EMS, vitals were fine so she stayed home due to Covid. She was seen at [facility] and the feeling was that she had sleep deprivation. Her   in  and her only son  in the  in 2018 and since then she struggles w depression and can't sleep. She is also unable to use her CPAP. But none of that relates to her visit today. She complains of dyspnea x months. She is dyspneic on 1.5 blocks. No history of lung disease. Never smoker. No cough or sputum. SLE treated by Dr Law with HCQ, formerly on prednisone but no longer. She saw a cardiologist last week, Dr Irizarry, and doesn't know the results of that evaluation as yet.\par \par 20: In the interim underwent Covid swab for PFT and tested positive, so follow up was delayed by isolation and wait for PFT. She stayed home, felt somewhat more dyspneic, had low grade temp 100.9, anosmia for a month, no GI symptoms. She feels back to her usual self now. Saw Dr Irizarry last week, told everything was normal. Remains dyspneic, has a sensation of her breath stopping in her mouth, can't sleep, and feels numb all over her body at night when she tries to go to sleep. Not using CPAP.\par \par 21: Went to [facility] ED last week for chest pain, told there was some abnormality on a CXR, had CTA w contrast reaction, was told this CT was normal. 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 Had some moderately elevated BPs afterward. She wants a sleep apnea test. This is because she woke from sleep last week feeling dyspneic. This happens often. Breathing is good and bad, some days fine, some days horrible. Rheum is Dr Deya Alan.\par \par 21: Her rheumatologist and psychiatrist added Cymbalta for her pain and anxiety/depression, and this helped w the pain but not the anxiety/depression. She may be given a holiday from the [facility]. Has good and bad days, same w breathing, some days good and some not as good. Sleep test had been denied but she brings outside sleep records. She was found to have mild NEVIN in 2019 and used autoPAP and felt that her symptoms improved. She reports ongoing feeling of choking at night, waking up gasping every night, does wake up with dry mouth. Falls asleep watching tv. Has gained 10-12 lbs since her last test and is on potentially sedating meds that she was not on 2 years ago. She also complains of her L arm being numb at night. Had both Covid vaccines.\par \par 21: Using CPAP, shows me report w AHI 1.4 and feels much better. Complaining of pain in the back of her neck. Breathing is ok. Spending time caring for her father who is very ill (dying?). Her CPAP doesn't work there, only at home. Is this because he doesn't have wifi?\par \par 22: Walks in stating her ortho wanted me to see her because of findings on shoulder imaging - has shoulder MRI report which only reports shoulder findings, but shoulder XR w LLL 4mm nodule. . She had in the interim called because of a CT shoulder in our ED and we had discussed that it showed a calcified granuloma. \par \par 22: Continues to struggle w insomnia. Had difficulty tolerating her CPAP last night and this is true about 3 nights a month. Most nights it is fine and she finds the CPAP helpful. Report on her letha indicates average AHI is 0.56 so that's great. Dyspnea is stable, gets fatigued on stairs. Has L frozen shoulder, has vertigo, these limit her walking. She's had two Covid shots but declines booster, not comfortable, has no reason she can articulate, her rheumatologist also told her to get it.

## 2022-04-12 NOTE — ASSESSMENT
[FreeTextEntry1] : Data reviewed:\par \par PSG 5/2019: AHI 5.9 / titrated to 9 cm H2O / compliance report showed median pressure 6.3\par HST 5/2021: AHI 16, no hypoxia\par \par PA/lat CXR R 6/2020 personally reviewed : clear lungs\par CT chest [facility] 10/21/20 personally reviewed : unremarkable\par CT chest [facility] 1/2021 personally reviewed : clear lungs\par CT shoulder [facility] 1/2/22 personally reviewed : calcified granuloma LLL\par \par PFT [facility] 10/9/20: FVC 1.95L (63%), FEV1 1.66L (67%), TLC 3.01L (66%), DLCO 13.2 (62%) (62in)\par PFT [facility] 1/14/21: FVC 1.77L (60%), FEV1 1.52L (64%), TLC 2.91L (66%), DLCO 13.2 (64%) (61in)\par PFT [facility] 4/13/21: FVC 1.93L (61%), FEV1 1.66L (66%), TLC 3.18L (67%), DLCO 12.76 (59%) (63in)\par PFT 85 8/12/21: FVC 1.95L (66%), FEV1 1.63L (70%), TLC 3.14L (71%), DLCO 14.83 (72%) (61in)\par PFT [facility] 4/12/22: FVC 1.99L (63%), FEV1 1.61L (64%), TLC 3.14L (66%), DLCO 13.02 (61%) (63in)\par \par Impression:\par Calcified granuloma\par Dyspnea on exertion w SLE, restricted PFT, and normal lung parenchyma - PFT stable x 18 mos\par Mod NEVIN on CPAP\par \par Plan:\par I showed her the calcified granuloma yet and showed her that it was also there in 10/2020 and has nothing to do with the Covid vaccine and is benign and permanent and requires no evaluation or follow up.\par At this point can follow annually w PFT.\par Cont CPAP for mod NEVIN. \par Recommend Covid booster but she is "not ready." Discussed rising Covid rates.

## 2022-04-27 ENCOUNTER — OUTPATIENT (OUTPATIENT)
Dept: OUTPATIENT SERVICES | Facility: HOSPITAL | Age: 57
LOS: 1 days | End: 2022-04-27
Payer: COMMERCIAL

## 2022-04-27 ENCOUNTER — APPOINTMENT (OUTPATIENT)
Dept: MRI IMAGING | Facility: HOSPITAL | Age: 57
End: 2022-04-27
Payer: MEDICAID

## 2022-04-27 PROCEDURE — A9585: CPT

## 2022-04-27 PROCEDURE — 70553 MRI BRAIN STEM W/O & W/DYE: CPT | Mod: 26

## 2022-04-27 PROCEDURE — 70553 MRI BRAIN STEM W/O & W/DYE: CPT

## 2022-04-28 ENCOUNTER — APPOINTMENT (OUTPATIENT)
Dept: ENDOCRINOLOGY | Facility: CLINIC | Age: 57
End: 2022-04-28
Payer: MEDICAID

## 2022-04-28 VITALS
BODY MASS INDEX: 22.86 KG/M2 | OXYGEN SATURATION: 98 % | HEART RATE: 77 BPM | WEIGHT: 129 LBS | SYSTOLIC BLOOD PRESSURE: 117 MMHG | TEMPERATURE: 97.7 F | HEIGHT: 63 IN | DIASTOLIC BLOOD PRESSURE: 77 MMHG

## 2022-04-28 DIAGNOSIS — M81.0 AGE-RELATED OSTEOPOROSIS W/OUT CURRENT PATHOLOGICAL FRACTURE: ICD-10-CM

## 2022-04-28 PROCEDURE — 99214 OFFICE O/P EST MOD 30 MIN: CPT

## 2022-04-28 NOTE — REVIEW OF SYSTEMS
[Fatigue] : no fatigue [Decreased Appetite] : appetite not decreased [Dysphagia] : no dysphagia [Dysphonia] : no dysphonia [Chest Pain] : no chest pain [Palpitations] : no palpitations [Shortness Of Breath] : no shortness of breath [Nausea] : no nausea [Vomiting] : no vomiting [Back Pain] : back pain [Headaches] : no headaches

## 2022-04-28 NOTE — ASSESSMENT
[FreeTextEntry1] : Patient is a 58 yo woman here for follow up of osteoporosis/osteopenia\par \par 1. Osteoporosis\par -prescribed alendronate by rheumatology in 2019\par -adheres without reported side effects\par -rheumatology stopped alendronate December 2021\par -denies clinical fracture history but had cervical surgery for pain\par -no recent falls\par -fall precautions encouraged\par -no plans for major dental work\par -DXA results were reviewed and scanned into records/results embedded above\par -osteopenia\par \par Follow up with endocrine as needed.\par

## 2022-04-28 NOTE — HISTORY OF PRESENT ILLNESS
[FreeTextEntry1] : Patient is a 56 yo woman here for pituitary adenoma and osteoporosis\par \par Diagnosed with osteoporosis since 2014. Was diagnosed with lupus and around 2019 was told to start taking alendronate, managed by rheumatology \par Denies clinical fracture.\par Drinks milk; diet is fairly healthy\par History of benign breast lumpectomy\par States she had a bone density at [facility] recently but results are not available\par Adheres to alendronate without reported side effects\par Ambulates with cane, no recent falls\par No major dental work planned \par Current treatment included alendronate. Rheumatology stopped alendronate December 2021\par She reports getting surveillance bone density at [facility] but no records have been presented here\par  \par Pituitary adenoma in 1992 when she presented with breast milk discharge. She was 2 years post-partum at this time. Had previous endocrinologists in [place] but has not been seen in 10 years. Was on bromocriptine for 7-8 years and it was ultimately stopped. States the galactorrhea is intermittent. \par Pituitary MRI May 2020: no definite hypo enhancing pituitary adenomas or Rathke's cleft cyst demonstrated\par Menarche at age 17 years and last menstrual period was at age 37\par Has one son, natural conception\par Endocrine care was established here October 2021 at which point recommendation were made to have surveillance MRI.  She declined because she had undergone several CT scans for other reasons.  Prolactin levels were normal

## 2022-04-28 NOTE — PHYSICAL EXAM
[Alert] : alert [No Acute Distress] : no acute distress [No Respiratory Distress] : no respiratory distress [No Accessory Muscle Use] : no accessory muscle use [Normal Rate and Effort] : normal respiratory rate and effort [Normal Rate] : heart rate was normal [No Galactorrhea] : no galactorrhea [Normal Bowel Sounds] : normal bowel sounds [Not Tender] : non-tender [Soft] : abdomen soft [No Stigmata of Cushings Syndrome] : no stigmata of Cushings Syndrome [Normal Affect] : the affect was normal [Normal Insight/Judgement] : insight and judgment were intact [Normal Mood] : the mood was normal [de-identified] : soft cervical collar

## 2022-05-11 ENCOUNTER — APPOINTMENT (OUTPATIENT)
Dept: ORTHOPEDIC SURGERY | Facility: CLINIC | Age: 57
End: 2022-05-11
Payer: OTHER MISCELLANEOUS

## 2022-05-11 DIAGNOSIS — M79.7 FIBROMYALGIA: ICD-10-CM

## 2022-05-11 PROCEDURE — 99072 ADDL SUPL MATRL&STAF TM PHE: CPT

## 2022-05-11 PROCEDURE — 99214 OFFICE O/P EST MOD 30 MIN: CPT

## 2022-05-11 PROCEDURE — 72050 X-RAY EXAM NECK SPINE 4/5VWS: CPT

## 2022-05-11 NOTE — HISTORY OF PRESENT ILLNESS
[de-identified] : 56 year old female followup 7 months s/p C4-C5 ACDF with iliac crest autograft for cervical cord compression.  Since her last appointment she reports continued feelings of her neck being very heavy.  She has been having migraines and vertigo for which she saw a neurologist.  She uses her cervical collar at times when her neck feels tired.  She says she has been doing PT without improvement.   She has left shoulder pain and is being treated for a left shoulder adhesive capsulitis. She is followed by Dr. Pacheco for her shoulder who offered her surgery.  She is tolerating solid foods.  She denies radicular pain, difficulty breathing, recent illness, fevers, saddle anesthesia, urinary retention or fecal incontinence.

## 2022-05-11 NOTE — ASSESSMENT
[FreeTextEntry1] : 56 year old female followup 7 months s/p C4-C5 ACDF with iliac crest autograft for cervical cord compression.  Since her last appointment she reports continued feelings of her neck being very heavy.  She has been having migraines and vertigo for which she saw a neurologist.  She is otherwise neurologically intact. She uses her cervical collar at times when her neck feels tired. I encouraged her to discontinue the use of the collar.  She can continue PT.  She can continue gabapentin.  She will remain out of work 100% disabled. She will followup in 3 months. We discussed red flag symptoms that would require emergent evaluation. She knows to call with any questions or concerns or if her symptoms acutely worsen.

## 2022-05-11 NOTE — PHYSICAL EXAM
[de-identified] : General: No acute distress, conversant, well-nourished.\par Head: Normocephalic, atraumatic\par Neck: trachea midline, FROM\par Heart: normotensive and normal rate and rhythm\par Lungs: No labored breathing\par Skin: No abrasions, no rashes, no edema\par Psych: Alert and oriented to person, place and time\par Extremities: no peripheral edema or digital cyanosis\par Gait: Uses cane.\par Vascular: warm and well perfused distally, palpable distal pulses\par \par MSK:\par Cervical Spine: \par Incision well healed\par Early site incision c/d/i, staples removed and steri-strips applied\par \par NEURO:\par Sensation \par  Left \par C5 2/2 \par C6 2/2 \par C7 2/2 \par C8 2/2 \par T1 2/2 \par \par  Right \par C5 2/2 \par C6 2/2 \par C7 2/2 \par C8 2/2 \par T1 2/2 \par \par Motor: \par  Left \par C5 (deltoid abduction) 5/5 \par C6 (biceps flexion) 5/5 \par C7 (triceps extension) 5/5 \par C8 (finger flexion) 5/5 \par T1 (interosseous) 5/5 \par \par  Right \par C5 (deltoid abduction) 5/5 \par C6 (biceps flexion) 5/5 \par C7 (triceps extension) 5/5 \par C8 (finger flexion) 5/5 \par T1 (interosseous) 5/5 \par \par Sensation \par Left L2 - 2/2 \par Left L3 - 2/2\par Left L4 - 2/2\par Left L5 - 2/2\par Left S1 - 2/2\par \par Right L2 - 2/2 \par Right L3 - 2/2\par Right L4 - 2/2\par Right L5 - 2/2\par Right S1 - 2/2\par \par Motor: \par Left L2 (hip flexion) 5/5 \par Left L3 (knee extension) 5/5 \par Left L4 (ankle dorsiflexion) 5/5 \par Left L5 (long toe extensor) 5/5 \par Left S1 (ankle plantar flexion) 5/5\par \par Right L2 (hip flexion) 5/5 \par Right L3 (knee extension) 5/5 \par Right L4 (ankle dorsiflexion) 5/5 \par Right L5 (long toe extensor) 5/5 \par Right S1 (ankle plantar flexion) 5/5.  [de-identified] : I ordered cervical radiographs to evaluate her hardware\par \par Cervical 4 view radiographs obtained in the office today shows no fracture or dislocation. s/p ACDF C4-C5 with hardware in appropriate alignment. No instability on dynamic series.

## 2022-06-28 ENCOUNTER — APPOINTMENT (OUTPATIENT)
Dept: NEUROLOGY | Facility: CLINIC | Age: 57
End: 2022-06-28
Payer: MEDICAID

## 2022-06-28 VITALS
TEMPERATURE: 97.9 F | OXYGEN SATURATION: 97 % | HEIGHT: 63 IN | SYSTOLIC BLOOD PRESSURE: 118 MMHG | WEIGHT: 129 LBS | HEART RATE: 76 BPM | DIASTOLIC BLOOD PRESSURE: 80 MMHG | BODY MASS INDEX: 22.86 KG/M2

## 2022-06-28 LAB — MAGNESIUM SERPL-MCNC: 2 MG/DL

## 2022-06-28 PROCEDURE — 99214 OFFICE O/P EST MOD 30 MIN: CPT

## 2022-06-28 NOTE — ASSESSMENT
[FreeTextEntry1] : Yariel to 50 mg qhs\par \par Continue meclizine and Amitriptyline for HA therapy \par Mg levels\par RTC in 6 m

## 2022-06-28 NOTE — HISTORY OF PRESENT ILLNESS
[FreeTextEntry1] : 57 years old female, right handed \par \par Had MRI with contrast done, \par developed pain to right shoulder, and stop MRI a bit early. \par MRI was neg for pituitary adenoma \par \par Physical therapy for work related injury \par \par Headaches : felt vertigo at today's Visit, c/o 8/10.\par Manageable with medication \par \par fell 3 weeks - lost balance, she felt dizzy, tripped outside after leaving PT\par \par Meds for HA. \par Meclizine 12.5 PRN \par Amitriptyline 50 mg at bedtime \par \par Sleep: still experiences difficulty \par Retires: force herself to go at 10pm for sleep apnea machine \par Awakens: Now waking up at 4 am. \par \par -----------------------------------------------------------\par Follow up\par \par PMHx\par Pt was referred for evaluation of headaches. She was previously seen by Dr. Mead and Dr Amaya\par \par From Dr. Mead's notes:\par 54 yo W HA. hx of \par Her main complaint is pain throughout her body, although she also endorses weakness and numbness\par She reports history of pituitary tumor, fibromyalgia, carpal tunnel syndrome, migraine headaches. \par She is on cymbalta, prednisone, sertraline\par She has seen a neuro-ophthalmologist for visual problems, possibly related to plaquenil, but also states she cannot find why her vision is getting worse. \par ESR 6, , RF / CCP / SSA/B negative, B12/folate normal\par MRI brain 2/2017 unremarkable\par \par \par Pt states that headaches started more than 7 years ago. HAs are about 4 times a week. HAs described as sharp, frontal, pressure like (like "little needles"), at times associated with sensitivity to light and noise. Pt failed Topamax and Magnesium. Pt states that she needed to reduce the hours of work due to headaches. She states that she can not tolerate NSAIDs. She states that she is not taking any meds for acute headaches, but tries to lie down and use cold compresses. She reports Lactorrhea; the results of recent MRI are not available. 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 \par  \par Meds for Migraine\par TOP\par Celebrex\par Neurontin\par Imitrex\par \par Never took:\par \par Nortriptyline\par Propranolol\par Acido Valproico\par \par HPI\par \par Amitrip was increased to 25 mg in January and it did help a bit. .\par Not sleeping well\par neck pain not better, \par Doing PT for neck\par Ophthal shows binasal visual defect by Dr. Earl \par \par \par

## 2022-08-04 ENCOUNTER — EMERGENCY (EMERGENCY)
Facility: HOSPITAL | Age: 57
LOS: 1 days | Discharge: HOME | End: 2022-08-04
Attending: EMERGENCY MEDICINE | Admitting: EMERGENCY MEDICINE
Payer: COMMERCIAL

## 2022-08-04 VITALS
WEIGHT: 126.99 LBS | HEART RATE: 72 BPM | TEMPERATURE: 98 F | RESPIRATION RATE: 18 BRPM | HEIGHT: 63 IN | DIASTOLIC BLOOD PRESSURE: 90 MMHG | SYSTOLIC BLOOD PRESSURE: 135 MMHG | OXYGEN SATURATION: 98 %

## 2022-08-04 DIAGNOSIS — G43.909 MIGRAINE, UNSPECIFIED, NOT INTRACTABLE, WITHOUT STATUS MIGRAINOSUS: ICD-10-CM

## 2022-08-04 DIAGNOSIS — Z91.013 ALLERGY TO SEAFOOD: ICD-10-CM

## 2022-08-04 DIAGNOSIS — R20.2 PARESTHESIA OF SKIN: ICD-10-CM

## 2022-08-04 DIAGNOSIS — M06.9 RHEUMATOID ARTHRITIS, UNSPECIFIED: ICD-10-CM

## 2022-08-04 DIAGNOSIS — Z88.5 ALLERGY STATUS TO NARCOTIC AGENT: ICD-10-CM

## 2022-08-04 DIAGNOSIS — M25.512 PAIN IN LEFT SHOULDER: ICD-10-CM

## 2022-08-04 DIAGNOSIS — F32.9 MAJOR DEPRESSIVE DISORDER, SINGLE EPISODE, UNSPECIFIED: ICD-10-CM

## 2022-08-04 DIAGNOSIS — Z88.0 ALLERGY STATUS TO PENICILLIN: ICD-10-CM

## 2022-08-04 DIAGNOSIS — M54.2 CERVICALGIA: ICD-10-CM

## 2022-08-04 DIAGNOSIS — R11.0 NAUSEA: ICD-10-CM

## 2022-08-04 PROCEDURE — 99284 EMERGENCY DEPT VISIT MOD MDM: CPT

## 2022-08-04 PROCEDURE — 99282 EMERGENCY DEPT VISIT SF MDM: CPT

## 2022-08-04 PROCEDURE — 93010 ELECTROCARDIOGRAM REPORT: CPT

## 2022-08-04 NOTE — ED PROVIDER NOTE - ATTENDING CONTRIBUTION TO CARE
58 yo F with PMH of MDD, fibromyalgia, RA, chronic migraines and dizziness (managed with meclizine) p/w one week of progressively worsening L neck and L shoulder pain with reduced ROM and associated nausea, numbness and tingling. 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 Had a C4-C5 spnal fusion subsequently. Patient states that she has had this shoulder and neck pain since her surgery and has tried steroid injections and chiropractic treatments with minimal relief. She saw an orthopedic surgeon today for a second opinion and was counseled to come to the ED due to her intractable pain. Denies new rauma or injury. VS noted. ECG WNL. Pt AAO, NAD, limited ROM of left shoulder sec to pain. Equal strength b/l. Pt with chronic pain. No imaging indicated at this time. Pt declines pain meds. Pt reassured and has follow-up appointments on Tuesday 8/9/2022, with the neurologist and pain management specialist that follow her. Non-toxic appearing and stable for discharge. To follow up outpatient. Strict return precautions given.

## 2022-08-04 NOTE — ED ADULT NURSE NOTE - OBJECTIVE STATEMENT
Pt A&Ox4, ambulatory with steady gait, speaking in clear/full sentences, no acute distress, vital signs stable. Pt presents to the ED for chronic left shoulder and neck pain. Pt has extensive hx of arthritis and spinal surgery. Pt here for intractable pain. Pt declining narcotic pain medications. Pt has tried acetaminophen, lidocaine without relief. Pt allergic to NSAIDs. pt has follow-up appointments with spinal surgeon and PCP next week.

## 2022-08-04 NOTE — ED PROVIDER NOTE - PROGRESS NOTE DETAILS
Patient's pain did not worsen during her ED course; with encouragement patient was able to perform a greater ROM of the L upper extremity than at presentation. Upon reexamination the patient also indicated that she did have sensation in her L upper extremity but that it was "different" than the R upper extremity.

## 2022-08-04 NOTE — ED PROVIDER NOTE - CLINICAL SUMMARY MEDICAL DECISION MAKING FREE TEXT BOX
Patient's vital signs and ecg were noted. Patient was examined and found to have symptoms and clinical findings consistent with her history of cervical spine fusion and likely residual cervical radiculopathy. Patient was reassured that her previously scheduled follow-up appointments on Tuesday 8/9/2022, with the neurologist and pain management specialist that follow her, will be able to provide more information related to her neck and shoulder pain.

## 2022-08-04 NOTE — ED PROVIDER NOTE - PATIENT PORTAL LINK FT
You can access the FollowMyHealth Patient Portal offered by [facility] by registering at the following website: [email or web address]. By joining [employer]’s FollowMyHealth portal, you will also be able to view your health information using other applications (apps) compatible with our system.

## 2022-08-04 NOTE — ED ADULT TRIAGE NOTE - NS ED NURSE BANDS TYPE
Prescription approved per [facility] Refill Protocol.     Name band;

## 2022-08-04 NOTE — ED PROVIDER NOTE - OBJECTIVE STATEMENT
Patient is 58 yo female with past medical hx of MDD, RA, chronic migraines and dizziness (managed with meclizine) complaining of one week of progressively worsening L neck and L shoulder pain with reduced ROM and associated nausea, numbness and tingling. 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 Patient states that she has had this shoulder and neck pain since her surgery and has tried steroid injections and chiropractic treatments with minimal relief. She saw an orthopedic surgeon this am for a second opinion and was counseled to come to the ED due to her intractable pain.    Patient denies fevers, chills, cough, chest pain, shortness of breath, vomiting, diarrhea, changes in urination or pain with urination, swelling of legs, or other symptoms at this time.

## 2022-08-04 NOTE — ED PROVIDER NOTE - NS ED ATTENDING STATEMENT MOD
I have seen and examined this patient and fully participated in the care of this patient as the teaching attending.  The service was shared with the GINO.  I reviewed and verified the documentation and independently performed the documented:

## 2022-08-04 NOTE — ED PROVIDER NOTE - IV ALTEPLASE INCLUSION HIDDEN
show
Instructions: This plan will send the code FBSD to the PM system.  DO NOT or CHANGE the price.
Price (Do Not Change): 0.00
Detail Level: Simple

## 2022-08-04 NOTE — ED ADULT TRIAGE NOTE - CHIEF COMPLAINT QUOTE
Pt c/o L shoulder pain x1week. Also c/o intermittent L arm numbness and tingling x1week. Pt reports pain is worse with movement. Denies recent trauma/heavy lifting. Denies chest pain, SOB, fevers, chills, n/v, weakness. Pt ambulates steadily. ekg dn in triage

## 2022-08-04 NOTE — ED ADULT NURSE NOTE - NS ED PATIENT SAFETY CONCERN
Constitutional: Well developed, well nourished. NAD  TRAUMA: ABC intact. GCS 15.   Head: Normocephalic, atraumatic.  Eyes: PERRL. EOMI. No Raccoon eyes.   ENT: No nasal discharge. Mucous membranes moist.  Neck: Supple. Painless ROM. No midline tenderness, stepoffs.  Cardiovascular: Normal S1, S2. Regular rate and rhythm. No murmurs, rubs, or gallops.  Pulmonary: Normal respiratory rate and effort. Lungs clear to auscultation bilaterally. No wheezing, rales, or rhonchi.  CHEST: No chest wall tenderness, crepitus.  Abdominal: Soft. Nondistended. Nontender. No rebound, guarding, rigidity.   BACK: No midline T/L/S tenderness, stepoffs. No saddle paresthesia. Right lower back minimal early bruise; no acute tenderness.   Extremities: Pelvis stable. No traumatic deformities, tenderness of extremities.  Skin: No rashes, cyanosis, lacerations, abrasions.  Neuro: AAOx3. Strength 5/5 in all extremities. Sensation intact throughout. No focal neurological deficits.  Psych: Normal mood. Normal affect. No

## 2022-08-04 NOTE — ED ADULT NURSE NOTE - NS ED NURSE LEVEL OF CONSCIOUSNESS MENTAL STATUS
55yo M with hx of GERD presents with light in his vision from 750-850am. No hx of migraines. No other neurological deficits. In both eyes, not going away in any position or closing/opening of one / both eyes. Then 2/10 HA started in all areas of the head. He does not usually get headaches. no trauma.
Awake/Alert

## 2022-08-04 NOTE — ED PROVIDER NOTE - NSFOLLOWUPCLINICS_GEN_ALL_ED_FT
[facility]  Family Medicine  [address]  [place], [place] [address]  Phone: [phone]  Fax:   Follow Up Time: 4-6 Days

## 2022-08-04 NOTE — ED PROVIDER NOTE - NSFOLLOWUPINSTRUCTIONS_ED_ALL_ED_FT
Please follow up with your primary care doctor, spine doctor and neurologist in a week.   Return to the ER if you develop any concerning symptoms.    Shoulder Pain      Many things can cause shoulder pain, including:  •An injury.      •Moving the shoulder in the same way again and again (overuse).      •Joint pain (arthritis).      Pain can come from:  •Swelling and irritation (inflammation) of any part of the shoulder.      •An injury to the shoulder joint.    •An injury to:  •Tissues that connect muscle to bone (tendons).      •Tissues that connect bones to each other (ligaments).      •Bones.          Follow these instructions at home:    Watch for changes in your symptoms. Let your doctor know about them. Follow these instructions to help with your pain.    If you have a sling:     •Wear the sling as told by your doctor. Remove it only as told by your doctor.    •Loosen the sling if your fingers:  •Tingle.       •Become numb.       •Turn cold and blue.         •Keep the sling clean.    •If the sling is not waterproof:  •Do not let it get wet.      •Take the sling off when you shower or bathe.          Managing pain, stiffness, and swelling    •If told, put ice on the painful area:  • Put ice in a plastic bag.       •Place a towel between your skin and the bag.       •Leave the ice on for 20 minutes, 2–3 times a day. Stop putting ice on if it does not help with the pain.        •Squeeze a soft ball or a foam pad as much as possible. This prevents swelling in the shoulder. It also helps to strengthen the arm.      General instructions     •Take over-the-counter and prescription medicines only as told by your doctor.      •Keep all follow-up visits as told by your doctor. This is important.        Contact a doctor if:    •Your pain gets worse.      •Medicine does not help your pain.      •You have new pain in your arm, hand, or fingers.        Get help right away if:  •Your arm, hand, or fingers:  •Tingle.      •Are numb.      •Are swollen.      •Are painful.      •Turn white or blue.          Summary    •Shoulder pain can be caused by many things. These include injury, moving the shoulder in the same away again and again, and joint pain.      •Watch for changes in your symptoms. Let your doctor know about them.      •This condition may be treated with a sling, ice, and pain medicine.      •Contact your doctor if the pain gets worse or you have new pain. Get help right away if your arm, hand, or fingers tingle or get numb, swollen, or painful.      •Keep all follow-up visits as told by your doctor. This is important.      This information is not intended to replace advice given to you by your health care provider. Make sure you discuss any questions you have with your health care provider.

## 2022-08-12 ENCOUNTER — APPOINTMENT (OUTPATIENT)
Dept: ORTHOPEDIC SURGERY | Facility: CLINIC | Age: 57
End: 2022-08-12

## 2022-08-12 PROCEDURE — 72050 X-RAY EXAM NECK SPINE 4/5VWS: CPT

## 2022-08-12 PROCEDURE — 99072 ADDL SUPL MATRL&STAF TM PHE: CPT

## 2022-08-12 PROCEDURE — 99214 OFFICE O/P EST MOD 30 MIN: CPT

## 2022-08-15 NOTE — ASSESSMENT
[FreeTextEntry1] : 57 year old female followup 10 months s/p C4-C5 ACDF with iliac crest autograft for cervical cord compression.  Since her last visit she continues to have left shoulder pain.  She is seen for Dr. Pacheco who is treating her for adhesive capsulitis. She has improvement after a left shoulder CSI but her second CSI did not provide any relief.  She is pending a NCS/EMG.  She continues to have migraines and vertigo for which she saw a neurologist. She is otherwise at her neurologic baseline. She will continue PT. She was given a prescription for tizanidine.  She will followup after her NCS/EMG.  We discussed red flag symptoms that would require emergent evaluation. She knows to call with any questions or concerns or if her symptoms acutely worsen.

## 2022-08-15 NOTE — HISTORY OF PRESENT ILLNESS
[de-identified] : 57 year old female followup 10 months s/p C4-C5 ACDF with iliac crest autograft for cervical cord compression.  Since her last visit she continues to have left shoulder pain.  She is seen for Dr. Pacheco who is treating her for adhesive capsulitis. She has improvement after a left shoulder CSI but her second CSI did not provide any relief.  She is pending a NCS/EMG.  She continues to have migraines and vertigo for which she saw a neurologist.  She has discontinued her cervical collar.  She is tolerating solid foods.  She denies radicular pain, difficulty breathing, recent illness, fevers, saddle anesthesia, urinary retention or fecal incontinence.

## 2022-08-15 NOTE — PHYSICAL EXAM
[de-identified] : General: No acute distress, conversant, well-nourished.\par Head: Normocephalic, atraumatic\par Neck: trachea midline, FROM\par Heart: normotensive and normal rate and rhythm\par Lungs: No labored breathing\par Skin: No abrasions, no rashes, no edema\par Psych: Alert and oriented to person, place and time\par Extremities: no peripheral edema or digital cyanosis\par Gait: Uses cane.\par Vascular: warm and well perfused distally, palpable distal pulses\par \par MSK:\par Cervical Spine: \par Incision well healed\par [place] site incision c/d/i, staples removed and steri-strips applied\par \par NEURO:\par Sensation \par  Left \par C5 2/2 \par C6 2/2 \par C7 2/2 \par C8 2/2 \par T1 2/2 \par \par  Right \par C5 2/2 \par C6 2/2 \par C7 2/2 \par C8 2/2 \par T1 2/2 \par \par Motor: \par  Left \par C5 (deltoid abduction) 5/5 \par C6 (biceps flexion) 5/5 \par C7 (triceps extension) 5/5 \par C8 (finger flexion) 5/5 \par T1 (interosseous) 5/5 \par \par  Right \par C5 (deltoid abduction) 5/5 \par C6 (biceps flexion) 5/5 \par C7 (triceps extension) 5/5 \par C8 (finger flexion) 5/5 \par T1 (interosseous) 5/5 \par \par Sensation \par Left L2 - 2/2 \par Left L3 - 2/2\par Left L4 - 2/2\par Left L5 - 2/2\par Left S1 - 2/2\par \par Right L2 - 2/2 \par Right L3 - 2/2\par Right L4 - 2/2\par Right L5 - 2/2\par Right S1 - 2/2\par \par Motor: \par Left L2 (hip flexion) 5/5 \par Left L3 (knee extension) 5/5 \par Left L4 (ankle dorsiflexion) 5/5 \par Left L5 (long toe extensor) 5/5 \par Left S1 (ankle plantar flexion) 5/5\par \par Right L2 (hip flexion) 5/5 \par Right L3 (knee extension) 5/5 \par Right L4 (ankle dorsiflexion) 5/5 \par Right L5 (long toe extensor) 5/5 \par Right S1 (ankle plantar flexion) 5/5.  [de-identified] : I ordered cervical radiographs to evaluate her hardware\par \par Cervical 4 view radiographs obtained in the office today shows no fracture or dislocation. s/p ACDF C4-C5 with hardware in appropriate alignment. No instability on dynamic series.   No change from marycarmen radiographs.

## 2022-08-31 ENCOUNTER — APPOINTMENT (OUTPATIENT)
Dept: ORTHOPEDIC SURGERY | Facility: CLINIC | Age: 57
End: 2022-08-31

## 2022-08-31 VITALS — BODY MASS INDEX: 21.97 KG/M2 | HEIGHT: 63 IN | WEIGHT: 124 LBS

## 2022-08-31 DIAGNOSIS — M75.02 ADHESIVE CAPSULITIS OF LEFT SHOULDER: ICD-10-CM

## 2022-08-31 PROCEDURE — 99072 ADDL SUPL MATRL&STAF TM PHE: CPT

## 2022-08-31 PROCEDURE — 99214 OFFICE O/P EST MOD 30 MIN: CPT

## 2022-08-31 NOTE — HISTORY OF PRESENT ILLNESS
[de-identified] : 57 year old female followup 10 months s/p C4-C5 ACDF with iliac crest autograft for cervical cord compression.  Since her last appointment she has continued to have significant left shoulder pain.  She is seen for Dr. Pacheco who is treating her for adhesive capsulitis. Dr. Pacheco has offered her shoulder surgery.  She continues to have migraines and vertigo for which she saw a neurologist.  She denies radicular pain, difficulty breathing, recent illness, fevers, saddle anesthesia, urinary retention or fecal incontinence.   She has her NCS/EMG results.

## 2022-08-31 NOTE — ASSESSMENT
[FreeTextEntry1] : 57 year old female followup 10 months s/p C4-C5 ACDF with iliac crest autograft for cervical cord compression.  Since her last appointment she has continued to have significant left shoulder pain.  She is seen for Dr. Pacheco who is treating her for adhesive capsulitis. Dr. Pacheco has offered her shoulder surgery.  She continues to have migraines and vertigo for which she saw a neurologist.  She is at her neurologic baseline. We reviewed her EMG/NCS results.  There is no evidence of cervical radiculopathy. She will followup with Dr. Pacheco for her left shoulder. She will followup with pain management. She will followup with me in 6-8 weeks. We discussed red flag symptoms that would require emergent evaluation. She knows to call with any questions or concerns or if her symptoms acutely worsen.

## 2022-08-31 NOTE — PHYSICAL EXAM
[de-identified] : General: No acute distress, conversant, well-nourished.\par Head: Normocephalic, atraumatic\par Neck: trachea midline, FROM\par Heart: normotensive and normal rate and rhythm\par Lungs: No labored breathing\par Skin: No abrasions, no rashes, no edema\par Psych: Alert and oriented to person, place and time\par Extremities: no peripheral edema or digital cyanosis\par Gait: Uses cane.\par Vascular: warm and well perfused distally, palpable distal pulses\par \par MSK:\par Cervical Spine: \par Incision well healed\par [place] site incision c/d/i, staples removed and steri-strips applied\par \par NEURO:\par Sensation \par  Left \par C5 2/2 \par C6 2/2 \par C7 2/2 \par C8 2/2 \par T1 2/2 \par \par  Right \par C5 2/2 \par C6 2/2 \par C7 2/2 \par C8 2/2 \par T1 2/2 \par \par Motor: \par  Left \par C5 (deltoid abduction) 5/5 \par C6 (biceps flexion) 5/5 \par C7 (triceps extension) 5/5 \par C8 (finger flexion) 5/5 \par T1 (interosseous) 5/5 \par \par  Right \par C5 (deltoid abduction) 5/5 \par C6 (biceps flexion) 5/5 \par C7 (triceps extension) 5/5 \par C8 (finger flexion) 5/5 \par T1 (interosseous) 5/5 \par \par Sensation \par Left L2 - 2/2 \par Left L3 - 2/2\par Left L4 - 2/2\par Left L5 - 2/2\par Left S1 - 2/2\par \par Right L2 - 2/2 \par Right L3 - 2/2\par Right L4 - 2/2\par Right L5 - 2/2\par Right S1 - 2/2\par \par Motor: \par Left L2 (hip flexion) 5/5 \par Left L3 (knee extension) 5/5 \par Left L4 (ankle dorsiflexion) 5/5 \par Left L5 (long toe extensor) 5/5 \par Left S1 (ankle plantar flexion) 5/5\par \par Right L2 (hip flexion) 5/5 \par Right L3 (knee extension) 5/5 \par Right L4 (ankle dorsiflexion) 5/5 \par Right L5 (long toe extensor) 5/5 \par Right S1 (ankle plantar flexion) 5/5.  [de-identified] : Cervical 4 view radiographs (5/11/22) no fracture or dislocation. s/p ACDF C4-C5 with hardware in appropriate alignment. No instability on dynamic series.   No change from marycarmen radiographs.

## 2022-10-03 ENCOUNTER — APPOINTMENT (OUTPATIENT)
Dept: INTERNAL MEDICINE | Facility: CLINIC | Age: 57
End: 2022-10-03

## 2022-10-03 VITALS
TEMPERATURE: 97.1 F | HEART RATE: 92 BPM | BODY MASS INDEX: 22.68 KG/M2 | WEIGHT: 128 LBS | DIASTOLIC BLOOD PRESSURE: 69 MMHG | RESPIRATION RATE: 14 BRPM | OXYGEN SATURATION: 98 % | HEIGHT: 63 IN | SYSTOLIC BLOOD PRESSURE: 117 MMHG

## 2022-10-03 DIAGNOSIS — M25.50 PAIN IN UNSPECIFIED JOINT: ICD-10-CM

## 2022-10-03 DIAGNOSIS — Z00.00 ENCOUNTER FOR GENERAL ADULT MEDICAL EXAMINATION W/OUT ABNORMAL FINDINGS: ICD-10-CM

## 2022-10-03 DIAGNOSIS — Z23 ENCOUNTER FOR IMMUNIZATION: ICD-10-CM

## 2022-10-03 PROCEDURE — 90472 IMMUNIZATION ADMIN EACH ADD: CPT

## 2022-10-03 PROCEDURE — G0009: CPT

## 2022-10-03 PROCEDURE — 90662 IIV NO PRSV INCREASED AG IM: CPT

## 2022-10-03 PROCEDURE — 99396 PREV VISIT EST AGE 40-64: CPT | Mod: GC,25

## 2022-10-03 PROCEDURE — 90732 PPSV23 VACC 2 YRS+ SUBQ/IM: CPT

## 2022-10-03 RX ORDER — FAMOTIDINE 20 MG/1
20 TABLET, FILM COATED ORAL DAILY
Refills: 0 | Status: ACTIVE | COMMUNITY
Start: 2022-10-03

## 2022-10-03 RX ORDER — METHOCARBAMOL 750 MG/1
750 TABLET, FILM COATED ORAL 3 TIMES DAILY
Qty: 84 | Refills: 1 | Status: DISCONTINUED | COMMUNITY
Start: 2021-12-10 | End: 2022-10-03

## 2022-10-03 RX ORDER — DIAZEPAM 2 MG/1
2 TABLET ORAL
Refills: 0 | Status: ACTIVE | COMMUNITY
Start: 2022-10-03

## 2022-10-03 RX ORDER — BETAMETHASONE DIPROPIONATE 0.5 MG/G
0.05 OINTMENT, AUGMENTED TOPICAL TWICE DAILY
Qty: 1 | Refills: 0 | Status: DISCONTINUED | COMMUNITY
Start: 2020-11-19 | End: 2022-10-03

## 2022-10-03 RX ORDER — SUCRALFATE 1 G/10ML
1 SUSPENSION ORAL
Refills: 0 | Status: ACTIVE | COMMUNITY
Start: 2022-10-03

## 2022-10-03 RX ORDER — DULOXETINE HYDROCHLORIDE 60 MG/1
60 CAPSULE, DELAYED RELEASE PELLETS ORAL TWICE DAILY
Refills: 0 | Status: ACTIVE | COMMUNITY

## 2022-10-03 RX ORDER — COLCHICINE 0.6 MG/1
0.6 TABLET ORAL DAILY
Refills: 0 | Status: ACTIVE | COMMUNITY
Start: 2022-10-03

## 2022-10-03 RX ORDER — CHLORHEXIDINE GLUCONATE 4 %
325 (65 FE) LIQUID (ML) TOPICAL DAILY
Qty: 30 | Refills: 2 | Status: DISCONTINUED | COMMUNITY
Start: 2021-10-15 | End: 2022-10-03

## 2022-10-03 RX ORDER — OXYCODONE 5 MG/1
5 TABLET ORAL
Qty: 28 | Refills: 0 | Status: DISCONTINUED | COMMUNITY
Start: 2021-10-22 | End: 2022-10-03

## 2022-10-03 RX ORDER — HYDROXYCHLOROQUINE SULFATE 200 MG/1
200 TABLET ORAL DAILY
Refills: 0 | Status: ACTIVE | COMMUNITY
Start: 2022-10-03

## 2022-10-03 RX ORDER — DOCUSATE SODIUM 100 MG/1
100 CAPSULE, LIQUID FILLED ORAL TWICE DAILY
Refills: 0 | Status: ACTIVE | COMMUNITY
Start: 2022-10-03

## 2022-10-04 ENCOUNTER — APPOINTMENT (OUTPATIENT)
Dept: NEUROLOGY | Facility: CLINIC | Age: 57
End: 2022-10-04

## 2022-10-04 VITALS
BODY MASS INDEX: 22.68 KG/M2 | TEMPERATURE: 97.9 F | SYSTOLIC BLOOD PRESSURE: 113 MMHG | DIASTOLIC BLOOD PRESSURE: 78 MMHG | WEIGHT: 128 LBS | HEIGHT: 63 IN | OXYGEN SATURATION: 99 % | HEART RATE: 71 BPM

## 2022-10-04 DIAGNOSIS — F32.A ANXIETY DISORDER, UNSPECIFIED: ICD-10-CM

## 2022-10-04 DIAGNOSIS — F41.9 ANXIETY DISORDER, UNSPECIFIED: ICD-10-CM

## 2022-10-04 DIAGNOSIS — R42 DIZZINESS AND GIDDINESS: ICD-10-CM

## 2022-10-04 PROCEDURE — 99214 OFFICE O/P EST MOD 30 MIN: CPT

## 2022-10-04 RX ORDER — METHYLPREDNISOLONE 4 MG/1
4 TABLET ORAL
Qty: 1 | Refills: 0 | Status: DISCONTINUED | COMMUNITY
Start: 2021-11-02 | End: 2022-10-04

## 2022-10-04 NOTE — ASSESSMENT
[FreeTextEntry1] : HCM: Papsmear and HPV testing negative 05/2022. amenable to influenza Vx. inititiated Shingrix vaccination 08/2022 (would like to receive subsequent doses at local pharmacy). PCV 13 2017. Tdap 2014. mammo 05/2022 BIRADS 2. colonoscopy 2019. declining COVID booster. \par - Pneumococcal 23 valent today\par - will likely need PNA 20 valent at a later date (>66 yo)\par - Influenza Vx today

## 2022-10-04 NOTE — REVIEW OF SYSTEMS
[Vision Problems] : vision problems [Shortness Of Breath] : shortness of breath [Abdominal Pain] : abdominal pain [Nausea] : nausea [Constipation] : constipation [Joint Pain] : joint pain [Muscle Pain] : muscle pain [Back Pain] : back pain [Headache] : headache [Dizziness] : dizziness [Negative] : Cardiovascular [Earache] : no earache [Sore Throat] : no sore throat [Wheezing] : no wheezing [Cough] : no cough [Vomiting] : no vomiting [Heartburn] : no heartburn [Melena] : no melena [Dysuria] : no dysuria [Incontinence] : no incontinence [Joint Swelling] : no joint swelling [Skin Rash] : no skin rash [Fainting] : no fainting [Memory Loss] : no memory loss [Unsteady Walking] : no ataxia

## 2022-10-04 NOTE — HISTORY OF PRESENT ILLNESS
[FreeTextEntry1] : Follow up\par \par PMHx\par Pt was referred for evaluation of headaches. She was previously seen by Dr. Mead and Dr Amaya\par \par From Dr. Mead's notes:\par 54 yo W HA. hx of \par Her main complaint is pain throughout her body, although she also endorses weakness and numbness\par She reports history of pituitary tumor, fibromyalgia, carpal tunnel syndrome, migraine headaches. \par She is on cymbalta, prednisone, sertraline\par She has seen a neuro-ophthalmologist for visual problems, possibly related to plaquenil, but also states she cannot find why her vision is getting worse. \par ESR 6, , RF / CCP / SSA/B negative, B12/folate normal\par MRI brain 2/2017 unremarkable\par \par \par Pt states that headaches started more than 7 years ago. HAs are about 4 times a week. HAs described as sharp, frontal, pressure like (like "little needles"), at times associated with sensitivity to light and noise. Pt failed Topamax and Magnesium. Pt states that she needed to reduce the hours of work due to headaches. She states that she can not tolerate NSAIDs. She states that she is not taking any meds for acute headaches, but tries to lie down and use cold compresses. She reports Lactorrhea; the results of recent MRI are not available. 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 \par  \par Meds for Migraine\par TOP\par Celebrex\par Neurontin\par Imitrex\par \par Never took:\par \par Nortriptyline\par Propranolol\par Acido Valproico\par \par HPI   10/4/2022\par \par Amitrip was increased to 50 mg in January 2022 and it did help a bit. w HA\par Vertigo in AM or almost qam. The room spins around aw nausea. \par \par Not sleeping well\par neck pain not better, \par Doing PT for neck\par Ophthal shows binasal visual defect by Dr. Earl \par \par \par \par

## 2022-10-04 NOTE — PHYSICAL EXAM
[Normal Sclera/Conjunctiva] : normal sclera/conjunctiva [PERRL] : pupils equal round and reactive to light [Normal Outer Ear/Nose] : the outer ears and nose were normal in appearance [No Lymphadenopathy] : no lymphadenopathy [Normal Rate] : normal rate  [Regular Rhythm] : with a regular rhythm [Normal S1, S2] : normal S1 and S2 [No Edema] : there was no peripheral edema [Normal] : soft, non-tender, non-distended, no masses palpated, no HSM and normal bowel sounds [No CVA Tenderness] : no CVA  tenderness [Grossly Normal Strength/Tone] : grossly normal strength/tone [No Focal Deficits] : no focal deficits [de-identified] : II/VI systolic murmur at LUSB

## 2022-10-04 NOTE — HEALTH RISK ASSESSMENT
[Patient reported mammogram was normal] : Patient reported mammogram was normal [Patient reported PAP Smear was normal] : Patient reported PAP Smear was normal [MammogramDate] : 05/22 [MammogramComments] : birads 2 [PapSmearDate] : 05/22 [PapSmearComments] : Normal

## 2022-10-04 NOTE — HISTORY OF PRESENT ILLNESS
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 Patient c/o continued migraines despite treatment with amitriptyline, continued depressive symptoms despite adherence to duloxetine regimen, nausea which patient attributes to new diagnosis of vertigo - accompanied by dizziness which usually occurs w/ changes in position lying to standing (now on meclizine), continued L shoulder pain despite PT 2x per week. Recent MRI of L shoulder displayed rotator cuff tendinosis with partial thickness articular side tear + bursal sided fraying of supraspinatus tendon. There is also moderate acromioclavicular joint arthrosis with mild subacromial subdeltoid busitis. Patient also c/o SOB with exertion after climbing 3 flights of stairs present for >1 year without any recent decreased exercise tolerance. \par \par Social History: Patient denies alcohol use. Never used tobacco. Lives in an elevator buul[place] with one pet (2lb dog). Adequately utilizes CPAP machine (nightly) per phone application review. \par \par HCM: \par - Papsmear and HPV testing negative 05/2022\par - amenable to influenza Vx\par - inititiated Shingrix vaccination 08/2022 (would like to receive subsequent doses at local pharmacy)\par - PCV 13 2017 \par - Tdap 2014\par - mammo 05/2022 BIRADS 2\par - colonoscopy 2019\par - declining COVID booster

## 2022-10-04 NOTE — END OF VISIT
[] : Resident [FreeTextEntry3] : 58 yo f w/ h/o c-spine fusion, depression/anxiety, anne on cpap, pituitary adenoma, questionable lupus,  migraine here for cpe\par \par #small rathke cyst- cont f/u neuro\par #migraine- cont f/u neuro\par #depression- on duloxetine- cont f/u psych\par #nausea- occurs in morning with abd pain- cont f/u gi- recent labs last week no anemia per her\par

## 2022-10-04 NOTE — PHYSICAL EXAM
[Person] : oriented to person [Place] : oriented to place [Time] : oriented to time [Short Term Intact] : short term memory intact [Span Intact] : the attention span was normal [Naming Objects] : no difficulty naming common objects [Fluency] : fluency intact [Current Events] : adequate knowledge of current events [Cranial Nerves Optic (II)] : visual acuity intact bilaterally,  visual fields full to confrontation, pupils equal round and reactive to light [Cranial Nerves Oculomotor (III)] : extraocular motion intact [Cranial Nerves Facial (VII)] : face symmetrical [Cranial Nerves Vestibulocochlear (VIII)] : hearing was intact bilaterally [Motor Tone] : muscle tone was normal in all four extremities [Motor Strength] : muscle strength was normal in all four extremities [Motor Handedness Right-Handed] : the patient is right hand dominant [1+] : Patella left 1+

## 2022-10-04 NOTE — ASSESSMENT
[FreeTextEntry1] : Yariel to 50 mg qhs\par \par Ref to Dr Wood for pain mx\par Ref to ENT for Vertigo

## 2022-10-05 ENCOUNTER — APPOINTMENT (OUTPATIENT)
Dept: INTERNAL MEDICINE | Facility: CLINIC | Age: 57
End: 2022-10-05

## 2022-10-06 ENCOUNTER — APPOINTMENT (OUTPATIENT)
Dept: OPHTHALMOLOGY | Facility: CLINIC | Age: 57
End: 2022-10-06

## 2022-10-06 ENCOUNTER — NON-APPOINTMENT (OUTPATIENT)
Age: 57
End: 2022-10-06

## 2022-10-06 PROCEDURE — 92133 CPTRZD OPH DX IMG PST SGM ON: CPT

## 2022-10-06 PROCEDURE — 92083 EXTENDED VISUAL FIELD XM: CPT

## 2022-10-06 PROCEDURE — 92014 COMPRE OPH EXAM EST PT 1/>: CPT

## 2022-10-10 ENCOUNTER — APPOINTMENT (OUTPATIENT)
Dept: NEPHROLOGY | Facility: CLINIC | Age: 57
End: 2022-10-10

## 2022-10-10 VITALS
SYSTOLIC BLOOD PRESSURE: 122 MMHG | WEIGHT: 128 LBS | BODY MASS INDEX: 22.68 KG/M2 | HEART RATE: 76 BPM | DIASTOLIC BLOOD PRESSURE: 80 MMHG | HEIGHT: 63 IN

## 2022-10-10 PROCEDURE — 99214 OFFICE O/P EST MOD 30 MIN: CPT

## 2022-10-10 NOTE — CONSULT LETTER
[Dear  ___] : Dear  [unfilled], [Consult Letter:] : I had the pleasure of evaluating your patient, [unfilled]. [Please see my note below.] : Please see my note below. [Consult Closing:] : Thank you very much for allowing me to participate in the care of this patient.  If you have any questions, please do not hesitate to contact me. [Sincerely,] : Sincerely, [FreeTextEntry3] : Sincerely, \par \par iMke Barksdale MD, FACP\par  [FreeTextEntry2] : Dr Deya Alan

## 2022-10-10 NOTE — ASSESSMENT
[FreeTextEntry1] : 57-year-old woman with 1+ proteinuria on recent urinalysis in the setting of longstanding SLE with normal renal function and no laboratory evidence of serologic activity, with normal BP.  I have asked her to repeat urinalysis as well as a urine microalbumin.  If her U ACR is elevated, I would likely start an ARB.  I do not believe this is lupus nephritis and would not consider renal biopsy, particularly since even if it turned out to be very mild LN, I would still only treat with a RAAS inhibitor.

## 2022-10-10 NOTE — HISTORY OF PRESENT ILLNESS
[FreeTextEntry1] : 57-year-old woman referred nearly 2 years ago because of very modest proteinuria in the setting of SLE.  However, she had no proteinuria on urinalysis or urine microalbumin here.  She had received Plaquenil for nearly a decade.  Her renal function remains normal with a creatinine of 0.55, BUN 12, , serum albumin 4.2, K4.2.  She did have 1+ protein on urinalysis with a sed rate of 19, normal C3 and C4 complement normal double-stranded DNA, normal CRP.  Her rheumatologist is Dr. Deya Alan.

## 2022-10-12 LAB
APPEARANCE: CLEAR
BACTERIA: NEGATIVE
BILIRUBIN URINE: NEGATIVE
BLOOD URINE: NEGATIVE
COLOR: NORMAL
CREAT SPEC-SCNC: 96 MG/DL
GLUCOSE QUALITATIVE U: NEGATIVE
HYALINE CASTS: 0 /LPF
KETONES URINE: NEGATIVE
LEUKOCYTE ESTERASE URINE: NEGATIVE
MICROALBUMIN 24H UR DL<=1MG/L-MCNC: 1.4 MG/DL
MICROALBUMIN/CREAT 24H UR-RTO: 15 MG/G
MICROSCOPIC-UA: NORMAL
NITRITE URINE: NEGATIVE
PH URINE: 6.5
PROTEIN URINE: NEGATIVE
RED BLOOD CELLS URINE: 1 /HPF
SPECIFIC GRAVITY URINE: 1.02
SQUAMOUS EPITHELIAL CELLS: 1 /HPF
UROBILINOGEN URINE: NORMAL
WHITE BLOOD CELLS URINE: 2 /HPF

## 2022-11-10 ENCOUNTER — APPOINTMENT (OUTPATIENT)
Dept: ORTHOPEDIC SURGERY | Facility: CLINIC | Age: 57
End: 2022-11-10

## 2022-11-10 PROCEDURE — 72050 X-RAY EXAM NECK SPINE 4/5VWS: CPT

## 2022-11-10 PROCEDURE — 99214 OFFICE O/P EST MOD 30 MIN: CPT

## 2022-11-10 NOTE — ASSESSMENT
[FreeTextEntry1] : 57 year old female followup s/p C4-C5 ACDF with iliac crest autograft (10/5/21) for cervical cord compression.  Since her last visit she has continued to have left shoulder pain and is scheduled for a left shoulder arthroscopy with Dr. Pacheco.  She denies radicular pain. She is neurologically intact.  She will proceed with shoulder surgery with Dr. Pacheco.  She can start PT for her neck.  She can continue tizanidine as needed. She will followup in 3 months. We discussed red flag symptoms that would require emergent evaluation. She knows to call with any questions or concerns or if her symptoms acutely worsen.

## 2022-11-10 NOTE — PHYSICAL EXAM
[de-identified] : General: No acute distress, conversant, well-nourished.\par Head: Normocephalic, atraumatic\par Neck: trachea midline, FROM\par Heart: normotensive and normal rate and rhythm\par Lungs: No labored breathing\par Skin: No abrasions, no rashes, no edema\par Psych: Alert and oriented to person, place and time\par Extremities: no peripheral edema or digital cyanosis\par Gait: Uses cane.\par Vascular: warm and well perfused distally, palpable distal pulses\par \par MSK:\par Cervical Spine: \par Incision well healed\par [place] site incision c/d/i, staples removed and steri-strips applied\par \par NEURO:\par Sensation \par  Left \par C5 2/2 \par C6 2/2 \par C7 2/2 \par C8 2/2 \par T1 2/2 \par \par  Right \par C5 2/2 \par C6 2/2 \par C7 2/2 \par C8 2/2 \par T1 2/2 \par \par Motor: \par  Left \par C5 (deltoid abduction) 5/5 \par C6 (biceps flexion) 5/5 \par C7 (triceps extension) 5/5 \par C8 (finger flexion) 5/5 \par T1 (interosseous) 5/5 \par \par  Right \par C5 (deltoid abduction) 5/5 \par C6 (biceps flexion) 5/5 \par C7 (triceps extension) 5/5 \par C8 (finger flexion) 5/5 \par T1 (interosseous) 5/5 \par \par Sensation \par Left L2 - 2/2 \par Left L3 - 2/2\par Left L4 - 2/2\par Left L5 - 2/2\par Left S1 - 2/2\par \par Right L2 - 2/2 \par Right L3 - 2/2\par Right L4 - 2/2\par Right L5 - 2/2\par Right S1 - 2/2\par \par Motor: \par Left L2 (hip flexion) 5/5 \par Left L3 (knee extension) 5/5 \par Left L4 (ankle dorsiflexion) 5/5 \par Left L5 (long toe extensor) 5/5 \par Left S1 (ankle plantar flexion) 5/5\par \par Right L2 (hip flexion) 5/5 \par Right L3 (knee extension) 5/5 \par Right L4 (ankle dorsiflexion) 5/5 \par Right L5 (long toe extensor) 5/5 \par Right S1 (ankle plantar flexion) 5/5.  [de-identified] : I ordered radiographs to evaluate the patient's symptoms.\par \par Cervical 4 view radiographs obtained in the office today show no fracture or dislocation. s/p ACDF C4-C5 with hardware in appropriate alignment. No instability on dynamic series.   No change from marycarmen radiographs.

## 2022-11-10 NOTE — HISTORY OF PRESENT ILLNESS
[de-identified] : 57 year old female followup s/p C4-C5 ACDF with iliac crest autograft (10/5/21) for cervical cord compression.  Since her last visit she has continued to have left shoulder pain and is scheduled for a left shoulder arthroscopy with Dr. Pacheco.  She denies radicular pain, difficulty breathing, recent illness, fevers, saddle anesthesia, urinary retention or fecal incontinence.  She reports neck fatigue when reading.  She also feels generalized fatigue with activity.  She has been unable to do PT for her neck due to her shoulder pain.

## 2022-11-17 ENCOUNTER — APPOINTMENT (OUTPATIENT)
Dept: PULMONOLOGY | Facility: CLINIC | Age: 57
End: 2022-11-17

## 2022-11-17 PROCEDURE — 99202 OFFICE O/P NEW SF 15 MIN: CPT

## 2022-11-18 NOTE — HISTORY OF PRESENT ILLNESS
[TextBox_4] : patient of dr. dumont who has dyspnea and restrictive lung disease but no parenchymal disease\par \par dx with lupus but not clear how this dx was made\par \par seen for emergency clearance for shoulder surgery\par \par her hx is complicated but there is nothing her that precludes shoulder surgery\par \par not clear why this clearance was requested on an emergency basis

## 2022-11-18 NOTE — REVIEW OF SYSTEMS
[Fatigue] : fatigue [Dyspnea] : dyspnea [Negative] : Psychiatric [TextBox_30] : waxing and waning dyspnea, may be anxiety related [TextBox_94] : generalized aches/ pains [TextBox_137] : reportedly with depression / insomnia/ sleep apnea

## 2022-11-18 NOTE — DISCUSSION/SUMMARY
[FreeTextEntry1] : there is no contraindication to the planned surgery\par \par mild sleep apnea, no special issues arise from this\par \par cleared

## 2022-12-12 ENCOUNTER — APPOINTMENT (OUTPATIENT)
Dept: INTERNAL MEDICINE | Facility: CLINIC | Age: 57
End: 2022-12-12

## 2022-12-12 ENCOUNTER — OUTPATIENT (OUTPATIENT)
Dept: OUTPATIENT SERVICES | Facility: HOSPITAL | Age: 57
LOS: 1 days | End: 2022-12-12
Payer: COMMERCIAL

## 2022-12-12 ENCOUNTER — LABORATORY RESULT (OUTPATIENT)
Age: 57
End: 2022-12-12

## 2022-12-12 VITALS
HEIGHT: 63 IN | WEIGHT: 126 LBS | HEART RATE: 69 BPM | OXYGEN SATURATION: 97 % | TEMPERATURE: 97.2 F | BODY MASS INDEX: 22.32 KG/M2 | DIASTOLIC BLOOD PRESSURE: 83 MMHG | SYSTOLIC BLOOD PRESSURE: 117 MMHG

## 2022-12-12 DIAGNOSIS — R06.09 OTHER FORMS OF DYSPNEA: ICD-10-CM

## 2022-12-12 DIAGNOSIS — M21.612 BUNION OF LEFT FOOT: ICD-10-CM

## 2022-12-12 DIAGNOSIS — Z01.818 ENCOUNTER FOR OTHER PREPROCEDURAL EXAMINATION: ICD-10-CM

## 2022-12-12 PROCEDURE — 93000 ELECTROCARDIOGRAM COMPLETE: CPT

## 2022-12-12 PROCEDURE — 71045 X-RAY EXAM CHEST 1 VIEW: CPT | Mod: 26

## 2022-12-12 PROCEDURE — 71045 X-RAY EXAM CHEST 1 VIEW: CPT

## 2022-12-12 PROCEDURE — 99214 OFFICE O/P EST MOD 30 MIN: CPT | Mod: 25,GC

## 2022-12-15 ENCOUNTER — APPOINTMENT (OUTPATIENT)
Dept: PAIN MANAGEMENT | Facility: CLINIC | Age: 57
End: 2022-12-15

## 2022-12-15 VITALS
BODY MASS INDEX: 22.15 KG/M2 | HEART RATE: 65 BPM | DIASTOLIC BLOOD PRESSURE: 82 MMHG | SYSTOLIC BLOOD PRESSURE: 115 MMHG | WEIGHT: 125 LBS | HEIGHT: 63 IN | RESPIRATION RATE: 14 BRPM

## 2022-12-15 PROCEDURE — 99205 OFFICE O/P NEW HI 60 MIN: CPT

## 2022-12-16 LAB
ALBUMIN SERPL ELPH-MCNC: 4.3 G/DL
ALP BLD-CCNC: 69 U/L
ALT SERPL-CCNC: 6 U/L
ANION GAP SERPL CALC-SCNC: 13 MMOL/L
APPEARANCE: CLEAR
APTT BLD: 34.9 SEC
AST SERPL-CCNC: 14 U/L
BASOPHILS # BLD AUTO: 0.03 K/UL
BASOPHILS NFR BLD AUTO: 0.5 %
BILIRUB SERPL-MCNC: 0.3 MG/DL
BILIRUBIN URINE: NEGATIVE
BLOOD URINE: NEGATIVE
BUN SERPL-MCNC: 13 MG/DL
CALCIUM SERPL-MCNC: 9.7 MG/DL
CHLORIDE SERPL-SCNC: 102 MMOL/L
CO2 SERPL-SCNC: 25 MMOL/L
COLOR: NORMAL
CREAT SERPL-MCNC: 0.61 MG/DL
EGFR: 104 ML/MIN/1.73M2
EOSINOPHIL # BLD AUTO: 0.05 K/UL
EOSINOPHIL NFR BLD AUTO: 0.9 %
GLUCOSE QUALITATIVE U: NEGATIVE
GLUCOSE SERPL-MCNC: 82 MG/DL
HCT VFR BLD CALC: 36.6 %
HGB BLD-MCNC: 10.9 G/DL
IMM GRANULOCYTES NFR BLD AUTO: 0.2 %
INR PPP: 1.04 RATIO
KETONES URINE: NEGATIVE
LEUKOCYTE ESTERASE URINE: ABNORMAL
LYMPHOCYTES # BLD AUTO: 1.88 K/UL
LYMPHOCYTES NFR BLD AUTO: 34.1 %
MAN DIFF?: NORMAL
MCHC RBC-ENTMCNC: 29.8 GM/DL
MCHC RBC-ENTMCNC: 30.4 PG
MCV RBC AUTO: 102.2 FL
MONOCYTES # BLD AUTO: 0.54 K/UL
MONOCYTES NFR BLD AUTO: 9.8 %
NEUTROPHILS # BLD AUTO: 3.01 K/UL
NEUTROPHILS NFR BLD AUTO: 54.5 %
NITRITE URINE: NEGATIVE
PH URINE: 6.5
PLATELET # BLD AUTO: 297 K/UL
POTASSIUM SERPL-SCNC: 4.3 MMOL/L
PROT SERPL-MCNC: 6.9 G/DL
PROTEIN URINE: NEGATIVE
PT BLD: 12.2 SEC
RBC # BLD: 3.58 M/UL
RBC # FLD: 13.2 %
SODIUM SERPL-SCNC: 140 MMOL/L
SPECIFIC GRAVITY URINE: 1.01
UROBILINOGEN URINE: NORMAL
WBC # FLD AUTO: 5.52 K/UL

## 2022-12-17 NOTE — ASSESSMENT
[FreeTextEntry1] : Patient currently does not want any interventions (Botox, trigger points).\par Patient also on cymbalta and amitriptyline which we educated patient of potential drug interactions. .\par Patient to increase Magnesium to 500mg \par Patient to add B2 400mg\par Will consider botox in future.- however patient refuses to have "needles in her face".

## 2022-12-17 NOTE — HISTORY OF PRESENT ILLNESS
[FreeTextEntry1] : This is a case of a  57  -year-old right-handed Female with a past medical history of  SLE, NEVIN,  who presents with a chief complaint of HEADACHES  that started in 2014. Patient states she had headaches in childhood but never was bad until 2014. Patient states she has aura (flashing lights) then headaches come and are sharp shooting pain in a band from forehead to back of head. Pt also has photophobia and phonophobia. Patient states pain she gets headaches 22/30 days a month. Patient states headaches last anywhere from 1-2 hours. Patient denies any fever, night sweats. Of note patient had shoulder surgery for arthritis 2 weeks prior November 22' and is still recovering.\par \par Location: forehead to back of head\par Description: sharp shooting pain\par Associated symptoms: nausea\par Triggers: coffee\par Comorbidities: SLE, NEVIN\par Other pain conditions: None\par Imaging: MRI 2022\par Medications: amitriptyline, celebrex, neurontin, imitrex, topiramate, magnesium, cymbalta\par Interventions: Trigger point (helped October 2022)\par Family history: Noncontributory\par Allergies: NKA\par \par

## 2022-12-17 NOTE — PHYSICAL EXAM
[FreeTextEntry1] : Constitutional: No signs of distress or signs of toxicity. \par Mental Status: Alert and well oriented. Speech fluent. No aphasia. Fund of knowledge intact. \par Psychiatric: Mood stable.\par Cranial Nerve: PERRLA: No papilledema; No VFC: No Steffany. V1-3 intact. No facial asymmetry, hearing grossly intact; palate elevates symmetrically, tongue midline\par Motor:No involuntary movements noted.  Adequate bulk, tone throughout, 5/5 strength of all muscle groups \par DTR: present and symmetrical; no clonus, plantars  downgoing\par Sensory: intact to primary and secondary modalities; neg Romberg\par Cerebellar: adequate finger to nose and heel to shin bilaterally.\par Gait: non antalgic or ataxic.\par Eyes: no redness or swelling\par HEENT: intact; no signs of trauma.\par Neck: No masses noted\par Pulmonary: no respiratory distress\par Vascular: no temperature, color change or sudomotor changes.; no edema\par Musculoskeletal: examination of the cervical spine reveals no midline tenderness, decreased range of motion upon flexion, extension and lateral rotation. Negative facet tenderness, Negative Spurlings bilaterally. examination of the lumbar spine reveals + paraspinal tenderness; Range of motion decreased upon flexion, extension and lateral rotation; negative facet loading, No tenderness of sciatic notch, No tenderness of bilateral greater trochanters, Negative JENAE, negative SLRT bilaterally,\par Skin: No rash.\par

## 2023-01-03 ENCOUNTER — APPOINTMENT (OUTPATIENT)
Dept: NEPHROLOGY | Facility: CLINIC | Age: 58
End: 2023-01-03
Payer: MEDICAID

## 2023-01-03 VITALS
WEIGHT: 125 LBS | HEART RATE: 68 BPM | DIASTOLIC BLOOD PRESSURE: 79 MMHG | SYSTOLIC BLOOD PRESSURE: 118 MMHG | BODY MASS INDEX: 22.15 KG/M2 | HEIGHT: 63 IN

## 2023-01-03 DIAGNOSIS — D64.9 ANEMIA, UNSPECIFIED: ICD-10-CM

## 2023-01-03 PROCEDURE — 99214 OFFICE O/P EST MOD 30 MIN: CPT

## 2023-01-03 NOTE — CONSULT LETTER
[Dear  ___] : Dear  [unfilled], [Consult Letter:] : I had the pleasure of evaluating your patient, [unfilled]. [Please see my note below.] : Please see my note below. [Consult Closing:] : Thank you very much for allowing me to participate in the care of this patient.  If you have any questions, please do not hesitate to contact me. [Sincerely,] : Sincerely, [FreeTextEntry2] : Dr Deya Alan [FreeTextEntry3] : Sincerely, \par \par Mike Barksdale MD, FACP\par

## 2023-01-03 NOTE — ASSESSMENT
[FreeTextEntry1] : 57-year-old woman with a long history of SLE, but normal renal function with no microalbuminuria, normal BP.  I have reassured her that she is doing well from our viewpoint and does not need a follow-up appointment unless something changes, such as a finding of proteinuria, rising creatinine, elevated BP, etc.  She will follow-up with Keegan Lilly, Waqas, etc

## 2023-01-17 NOTE — ED PROVIDER NOTE - IV ALTEPLASE EXCL ABS HIDDEN
show Glycopyrrolate Pregnancy And Lactation Text: This medication is Pregnancy Category B and is considered safe during pregnancy. It is unknown if it is excreted breast milk.

## 2023-02-02 ENCOUNTER — APPOINTMENT (OUTPATIENT)
Dept: ORTHOPEDIC SURGERY | Facility: CLINIC | Age: 58
End: 2023-02-02
Payer: OTHER MISCELLANEOUS

## 2023-02-02 ENCOUNTER — FORM ENCOUNTER (OUTPATIENT)
Age: 58
End: 2023-02-02

## 2023-02-02 PROCEDURE — 99214 OFFICE O/P EST MOD 30 MIN: CPT

## 2023-02-02 PROCEDURE — 72050 X-RAY EXAM NECK SPINE 4/5VWS: CPT

## 2023-02-02 NOTE — PHYSICAL EXAM
[de-identified] : General: No acute distress, conversant, well-nourished.\par Head: Normocephalic, atraumatic\par Neck: trachea midline, FROM\par Heart: normotensive and normal rate and rhythm\par Lungs: No labored breathing\par Skin: No abrasions, no rashes, no edema\par Psych: Alert and oriented to person, place and time\par Extremities: no peripheral edema or digital cyanosis\par Gait: Uses cane.\par Vascular: warm and well perfused distally, palpable distal pulses\par \par MSK:\par Cervical Spine: \par Incision well healed\par [place] site incision c/d/i, staples removed and steri-strips applied\par \par NEURO:\par Sensation \par  Left \par C5 2/2 \par C6 2/2 \par C7 2/2 \par C8 2/2 \par T1 2/2 \par \par  Right \par C5 2/2 \par C6 2/2 \par C7 2/2 \par C8 2/2 \par T1 2/2 \par \par Motor: \par  Left \par C5 (deltoid abduction) 5/5 \par C6 (biceps flexion) 5/5 \par C7 (triceps extension) 5/5 \par C8 (finger flexion) 5/5 \par T1 (interosseous) 5/5 \par \par  Right \par C5 (deltoid abduction) 5/5 \par C6 (biceps flexion) 5/5 \par C7 (triceps extension) 5/5 \par C8 (finger flexion) 5/5 \par T1 (interosseous) 5/5 \par \par Sensation \par Left L2 - 2/2 \par Left L3 - 2/2\par Left L4 - 2/2\par Left L5 - 2/2\par Left S1 - 2/2\par \par Right L2 - 2/2 \par Right L3 - 2/2\par Right L4 - 2/2\par Right L5 - 2/2\par Right S1 - 2/2\par \par Motor: \par Left L2 (hip flexion) 5/5 \par Left L3 (knee extension) 5/5 \par Left L4 (ankle dorsiflexion) 5/5 \par Left L5 (long toe extensor) 5/5 \par Left S1 (ankle plantar flexion) 5/5\par \par Right L2 (hip flexion) 5/5 \par Right L3 (knee extension) 5/5 \par Right L4 (ankle dorsiflexion) 5/5 \par Right L5 (long toe extensor) 5/5 \par Right S1 (ankle plantar flexion) 5/5.  [de-identified] : I ordered radiographs to evaluate the patient's symptoms.\par \par Cervical 4 view radiographs obtained in the office today show no fracture or dislocation. s/p ACDF C4-C5 with hardware in appropriate alignment. No instability on dynamic series.   No change from marycarmen radiographs.

## 2023-02-02 NOTE — HISTORY OF PRESENT ILLNESS
[de-identified] : 57 year old female followup s/p C4-C5 ACDF with iliac crest autograft (10/5/21) for cervical cord compression. She denies radicular pain, difficulty breathing, recent illness, fevers, saddle anesthesia, urinary retention or fecal incontinence. She continues to have generalized fatigue with activity.  She had her left shoulder surgery with Dr. Pacheco. She reports worsened left shoulder pain since the surgery. She is doing PT for her shoulder. She had a RFA for her neck without relief.  She saw a neurologist regarding her headaches but deferred botox injections.

## 2023-02-02 NOTE — ASSESSMENT
[FreeTextEntry1] : 57 year old female followup s/p C4-C5 ACDF with iliac crest autograft (10/5/21) for cervical cord compression.  She has no radicular pain. She is at her neurologic baseline.  She is recovering from left shoulder surgery. She is doing PT for her left shoulder.  She can do PT for her neck when able. The patient was given a referral for physical therapy.  The patient was given a referral for consideration for additional spinal injections. She can take tizanidine as needed. She will followup in 3 months. We discussed red flag symptoms that would require emergent evaluation. She knows to call with any questions or concerns or if her symptoms acutely worsen.

## 2023-02-08 ENCOUNTER — FORM ENCOUNTER (OUTPATIENT)
Age: 58
End: 2023-02-08

## 2023-02-22 ENCOUNTER — APPOINTMENT (OUTPATIENT)
Dept: ORTHOPEDIC SURGERY | Facility: CLINIC | Age: 58
End: 2023-02-22
Payer: OTHER MISCELLANEOUS

## 2023-02-22 PROCEDURE — 99214 OFFICE O/P EST MOD 30 MIN: CPT

## 2023-02-22 PROCEDURE — 72050 X-RAY EXAM NECK SPINE 4/5VWS: CPT

## 2023-02-22 PROCEDURE — 99072 ADDL SUPL MATRL&STAF TM PHE: CPT

## 2023-03-16 ENCOUNTER — APPOINTMENT (OUTPATIENT)
Dept: NEUROLOGY | Facility: CLINIC | Age: 58
End: 2023-03-16
Payer: MEDICAID

## 2023-03-16 VITALS
SYSTOLIC BLOOD PRESSURE: 112 MMHG | BODY MASS INDEX: 22.15 KG/M2 | TEMPERATURE: 98 F | WEIGHT: 125 LBS | OXYGEN SATURATION: 97 % | HEART RATE: 82 BPM | HEIGHT: 63 IN | DIASTOLIC BLOOD PRESSURE: 80 MMHG

## 2023-03-16 PROCEDURE — 99214 OFFICE O/P EST MOD 30 MIN: CPT

## 2023-03-16 NOTE — PHYSICAL EXAM
[FreeTextEntry1] : General:\par Constitutional: Sitting comfortably in NAD.\par Psychiatric: well-groomed, appropriate affect\par Ears, Nose, Throat: no abnormalities, mucus membranes moist\par Neck: supple\par Extremities: no edema, clubbing or cyanosis\par Skin: no rash or neuro-cutaneous signs\par \par Cognitive:\par Orientation, language, memory and knowledge screens intact.\par \par Cranial Nerves:\par II: BROWN. III/IV/VI: EOM Full. VII: Face appears symmetric. VIII: Normal to screening. IX/X: Normal phonation. XI: Trapezius Symmetric. XII: Tongue midline. \par Motor: No tremor. L shoulder pain limited \par Power: No pronator drift.\par \par Normal gait.\par \par DTR 2+ bilateral upper extremities, 2+ right lower extremity, absent left lower extremity \par

## 2023-03-16 NOTE — DISCUSSION/SUMMARY
[FreeTextEntry1] : 58 y/o w migraines, not interested in recommended Botox treatments per Dr. Wood.\par Pituitary Adenoma, stable\par Pain syndrome, taking Cymbalta 60mg BID.\par

## 2023-03-16 NOTE — HISTORY OF PRESENT ILLNESS
[FreeTextEntry1] : 3/16/23 HPI:\par \par Judit is a 57 year old female presenting for follow up.\par \par HA somewhat improved. Saw Dr. Wood but isn't interested in following up because he recommended Botox, which pt is not interested in. Currently taking Amitriptyline 50mg and Magnesium for headaches. \par \par Recently saw rheumatologist, attributes pain to chronic pain rather than autoimmune causes. Referred to pain management. Recently had DEXA scan, showed worsening osteopenia. Rheumatologist recommending medical management but pt is apprehensive. Routine labs showed elevated parathyroid hormone, scheduled to see endocrinologist this week.\par \par Following with ENT for vertigo\par \par \par Background:\par PMHx\par Pt was referred for evaluation of headaches. She was previously seen by Dr. Mead and Dr Amaya\par \par From Dr. Mead's notes:\par 54 yo W HA. hx of \par Her main complaint is pain throughout her body, although she also endorses weakness and numbness\par She reports history of pituitary tumor, fibromyalgia, carpal tunnel syndrome, migraine headaches. \par She is on cymbalta, prednisone, sertraline\par She has seen a neuro-ophthalmologist for visual problems, possibly related to plaquenil, but also states she cannot find why her vision is getting worse. \par ESR 6, , RF / CCP / SSA/B negative, B12/folate normal\par MRI brain 2/2017 unremarkable\par \par \par Pt states that headaches started more than 7 years ago. HAs are about 4 times a week. HAs described as sharp, frontal, pressure like (like "little needles"), at times associated with sensitivity to light and noise. Pt failed Topamax and Magnesium. Pt states that she needed to reduce the hours of work due to headaches. She states that she can not tolerate NSAIDs. She states that she is not taking any meds for acute headaches, but tries to lie down and use cold compresses. She reports Lactorrhea; the results of recent MRI are not available. 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 \par  \par Meds for Migraine\par TOP\par Celebrex\par Neurontin\par Imitrex\par \par Never took:\par \par Nortriptyline\par Propranolol\par Acido Valproico\par \par HPI 10/4/2022\par \par Amitrip was increased to 50 mg in January 2022 and it did help a bit. w HA\par Vertigo in AM or almost qam. The room spins around aw nausea. \par \par Not sleeping well\par neck pain not better, \par Doing PT for neck\par Ophthal shows binasal visual defect by Dr. Earl \par

## 2023-03-16 NOTE — ASSESSMENT
[FreeTextEntry1] : 1) Follow up with pain specialist \par 2) Follow up with rheumatology for osteopenia\par 3) Follow up with endocrinology for elevated parathyroid hormone \par 4) Continue Amitriptyline and Magnesium for headaches

## 2023-03-17 ENCOUNTER — APPOINTMENT (OUTPATIENT)
Dept: ENDOCRINOLOGY | Facility: CLINIC | Age: 58
End: 2023-03-17
Payer: MEDICAID

## 2023-03-17 VITALS
DIASTOLIC BLOOD PRESSURE: 81 MMHG | BODY MASS INDEX: 22.15 KG/M2 | SYSTOLIC BLOOD PRESSURE: 119 MMHG | TEMPERATURE: 97.3 F | WEIGHT: 125 LBS | OXYGEN SATURATION: 96 % | HEART RATE: 85 BPM | HEIGHT: 63 IN

## 2023-03-17 PROCEDURE — 99214 OFFICE O/P EST MOD 30 MIN: CPT

## 2023-03-17 RX ORDER — METHYLPREDNISOLONE 4 MG/1
4 TABLET ORAL
Qty: 1 | Refills: 0 | Status: DISCONTINUED | COMMUNITY
Start: 2023-02-22 | End: 2023-03-17

## 2023-03-17 NOTE — PHYSICAL EXAM
[Alert] : alert [No Acute Distress] : no acute distress [Thyroid Not Enlarged] : the thyroid was not enlarged [No Respiratory Distress] : no respiratory distress [Clear to Auscultation] : lungs were clear to auscultation bilaterally [Normal Rate] : heart rate was normal [Normal Appearance] : normal in appearance [No Nipple Discharge] : no nipple discharge [Normal Bowel Sounds] : normal bowel sounds [Soft] : abdomen soft [Normal Gait] : normal gait [Normal Affect] : the affect was normal [Normal Insight/Judgement] : insight and judgment were intact [Normal Mood] : the mood was normal

## 2023-03-17 NOTE — HISTORY OF PRESENT ILLNESS
[FreeTextEntry1] : Patient is a 58 yo woman referred back here for elevated PTH\par \par Patient saw her rheumatologist and had several symptoms.  After cervical surgery she complained of some difficulty, muscle aches, etc.  Her rheumatologist "checked everything" and PTH was elevated.  Osteoporosis was being treated by rheumatology with alendronate.  She was on it from [id number] and rheumatology discontinued it as bone were looking improved. \par No history of fractures. No history of kidney stones\par Drinks lots of water and has lots of neck pain/aches/etc which she is seeing her rheumatologist for.\par March 2023\par Calcium 9.9\par \par Vitamin D 24.8\par Phosphorus 3.0\par .1

## 2023-03-17 NOTE — ASSESSMENT
[FreeTextEntry1] : Patient is a 56 yo woman here for elevated PTH and hx of Rathke's cleft cyst\par \par 1. Normocalcemic hyperparathyroidism\par -the patient has normal calcium levels with elevated PTH of 125.  She did not have paper copies and significant time spent getting access to her phone results.  Calcium is 9.9 with elevated PTH and sufficient vitamin D\par -she has no history of fractures or kidney stones\par -was being treated for osteoporosis by rheumatology but stopped after 1 year on alendronate.  Surveillance DXA consistent with T scores in osteopenic ranges while there is no 1/3 radius T score\par -the patient had cervical neck surgery and has subsequent pain/aches that she communicates. \par -at this time, to complete the work up, check 24 hour urine calcium levels.  Urine collection jars provided today\par -patient advised to stay  hydrated with water\par -indications for surgical management of primary hyperparathyroidism including calcium > 1 mg/dl above ULN, GFR < 60, age, presence of symptoms, osteoporosis and kidney stones\par -the future potential surgical management was discussed but patient strongly declined surgery especially if it's going to be near her neck region. At this time, there are no indications for surgery\par -recent bone density from March 2023 reviewed on phone and results recorded above\par \par 2. Rathke's cleft cyst\par -seen on MRI from April 2022, no pituitary adenoma\par -no galactorrhea on exam\par \par Follow up in six months\par

## 2023-03-17 NOTE — REVIEW OF SYSTEMS
[Neck Pain] : neck pain [As Noted in HPI] : as noted in HPI [Back Pain] : back pain [Shortness Of Breath] : no shortness of breath [Cough] : no cough [Nausea] : no nausea [Constipation] : no constipation [Vomiting] : no vomiting [Diarrhea] : no diarrhea [FreeTextEntry9] : neck pain

## 2023-03-22 ENCOUNTER — NON-APPOINTMENT (OUTPATIENT)
Age: 58
End: 2023-03-22

## 2023-03-22 LAB
CAU: 15 MG/DL
CREAT 24H UR-MCNC: 0.8 G/24 H
CREAT ?TM UR-MCNC: 72 MG/DL
PROT ?TM UR-MCNC: 24 HR
SPECIMEN VOL 24H UR: 1150 ML
SPECIMEN VOL 24H UR: 172 MG/24 H

## 2023-04-04 ENCOUNTER — APPOINTMENT (OUTPATIENT)
Dept: ORTHOPEDIC SURGERY | Facility: CLINIC | Age: 58
End: 2023-04-04
Payer: OTHER MISCELLANEOUS

## 2023-04-04 PROCEDURE — 72050 X-RAY EXAM NECK SPINE 4/5VWS: CPT

## 2023-04-04 PROCEDURE — 99214 OFFICE O/P EST MOD 30 MIN: CPT

## 2023-04-05 NOTE — ASSESSMENT
[FreeTextEntry1] : 57 year old female followup s/p C4-C5 ACDF with iliac crest autograft (10/5/21) for cervical cord compression.  She has no radicular pain. She is at her neurologic baseline.  She is recovering from left shoulder surgery. Since her last visit she had severe pain in her neck after PT.  We reviewed her recent cervical radiographs and MRI.  Stable degenerative changes, no hardware complication and no compression of the neural elements. No indication for spine surgery. She was give Toradol injection in the office today. She was given a steroid dose pack. If pain fails to improve recommend going to ED.  She can resume PT when pain improves.  She will followup in 1-2 weeks. We discussed red flag symptoms that would require emergent evaluation. She knows to call with any questions or concerns or if her symptoms acutely worsen.

## 2023-04-05 NOTE — PHYSICAL EXAM
[de-identified] : General: No acute distress, conversant, well-nourished.\par Head: Normocephalic, atraumatic\par Neck: trachea midline, FROM\par Heart: normotensive and normal rate and rhythm\par Lungs: No labored breathing\par Skin: No abrasions, no rashes, no edema\par Psych: Alert and oriented to person, place and time\par Extremities: no peripheral edema or digital cyanosis\par Gait: Uses cane.\par Vascular: warm and well perfused distally, palpable distal pulses\par \par MSK:\par Cervical Spine: \par Incision well healed\par [place] site incision c/d/i, staples removed and steri-strips applied\par \par NEURO:\par Sensation \par  Left \par C5 2/2 \par C6 2/2 \par C7 2/2 \par C8 2/2 \par T1 2/2 \par \par  Right \par C5 2/2 \par C6 2/2 \par C7 2/2 \par C8 2/2 \par T1 2/2 \par \par Motor: \par  Left \par C5 (deltoid abduction) 5/5 \par C6 (biceps flexion) 5/5 \par C7 (triceps extension) 5/5 \par C8 (finger flexion) 5/5 \par T1 (interosseous) 5/5 \par \par  Right \par C5 (deltoid abduction) 5/5 \par C6 (biceps flexion) 5/5 \par C7 (triceps extension) 5/5 \par C8 (finger flexion) 5/5 \par T1 (interosseous) 5/5 \par \par Sensation \par Left L2 - 2/2 \par Left L3 - 2/2\par Left L4 - 2/2\par Left L5 - 2/2\par Left S1 - 2/2\par \par Right L2 - 2/2 \par Right L3 - 2/2\par Right L4 - 2/2\par Right L5 - 2/2\par Right S1 - 2/2\par \par Motor: \par Left L2 (hip flexion) 5/5 \par Left L3 (knee extension) 5/5 \par Left L4 (ankle dorsiflexion) 5/5 \par Left L5 (long toe extensor) 5/5 \par Left S1 (ankle plantar flexion) 5/5\par \par Right L2 (hip flexion) 5/5 \par Right L3 (knee extension) 5/5 \par Right L4 (ankle dorsiflexion) 5/5 \par Right L5 (long toe extensor) 5/5 \par Right S1 (ankle plantar flexion) 5/5.  [de-identified] : I ordered radiographs to evaluate the patient's symptoms.\par \par Cervical 4 view radiographs obtained in the office today show no fracture or dislocation. s/p ACDF C4-C5 with hardware in appropriate alignment. No instability on dynamic series.   No change from marycarmen radiographs.\par \par Cervical MRI (2/1/23): 1. Status post ACDF at C4-C5, in anatomic alignment, with the canal and neural foramina well decompressed at the surgical level\par 2. At C3-C4, C5-C6 and C6-C7 there is marked disc space narrowing with posterior disc-osteophyte complexes deforming the thecal sac associated with left foraminal narrowing at C5-C6 and C6-C7\par 3. Reversal of the upper cervical lordosis

## 2023-04-05 NOTE — HISTORY OF PRESENT ILLNESS
[de-identified] : 57 year old female followup s/p C4-C5 ACDF with iliac crest autograft (10/5/21) for cervical cord compression. She denies radicular pain, difficulty breathing, recent illness, fevers, saddle anesthesia, urinary retention or fecal incontinence.  She had her left shoulder surgery with Dr. Pacheco. She reports worsened left shoulder pain since the surgery. She sees a neurologist for headaches.  SInce her last visit she had a PT session and she had severe neck and shoulder pain afterwards.

## 2023-04-06 ENCOUNTER — APPOINTMENT (OUTPATIENT)
Dept: OPHTHALMOLOGY | Facility: CLINIC | Age: 58
End: 2023-04-06
Payer: MEDICAID

## 2023-04-06 ENCOUNTER — NON-APPOINTMENT (OUTPATIENT)
Age: 58
End: 2023-04-06

## 2023-04-06 PROCEDURE — 92083 EXTENDED VISUAL FIELD XM: CPT

## 2023-04-06 PROCEDURE — 92133 CPTRZD OPH DX IMG PST SGM ON: CPT

## 2023-04-06 PROCEDURE — 92014 COMPRE OPH EXAM EST PT 1/>: CPT

## 2023-04-12 ENCOUNTER — APPOINTMENT (OUTPATIENT)
Dept: PULMONOLOGY | Facility: CLINIC | Age: 58
End: 2023-04-12
Payer: MEDICAID

## 2023-04-12 VITALS
BODY MASS INDEX: 22.15 KG/M2 | HEIGHT: 63 IN | OXYGEN SATURATION: 98 % | SYSTOLIC BLOOD PRESSURE: 110 MMHG | DIASTOLIC BLOOD PRESSURE: 80 MMHG | WEIGHT: 125 LBS | TEMPERATURE: 96.6 F | HEART RATE: 67 BPM

## 2023-04-12 DIAGNOSIS — J98.4 OTHER DISORDERS OF LUNG: ICD-10-CM

## 2023-04-12 PROCEDURE — 94727 GAS DIL/WSHOT DETER LNG VOL: CPT

## 2023-04-12 PROCEDURE — 94729 DIFFUSING CAPACITY: CPT

## 2023-04-12 PROCEDURE — 94060 EVALUATION OF WHEEZING: CPT

## 2023-04-12 PROCEDURE — 99214 OFFICE O/P EST MOD 30 MIN: CPT | Mod: 25

## 2023-04-12 RX ORDER — FLUTICASONE PROPIONATE 50 UG/1
50 SPRAY, METERED NASAL DAILY
Qty: 1 | Refills: 1 | Status: ACTIVE | COMMUNITY
Start: 2023-04-12 | End: 1900-01-01

## 2023-04-12 NOTE — CONSULT LETTER
[Dear  ___] : Dear  [unfilled], [Courtesy Letter:] : I had the pleasure of seeing your patient, [unfilled], in my office today. [Please see my note below.] : Please see my note below. [Consult Closing:] : Thank you very much for allowing me to participate in the care of this patient.  If you have any questions, please do not hesitate to contact me. [Sincerely,] : Sincerely, [FreeTextEntry2] : Anton Kc MD\par [address] \[place] [address]\par  [FreeTextEntry3] : Esme Rodriguez MD, FCCP\par

## 2023-04-12 NOTE — ASSESSMENT
[FreeTextEntry1] : Data reviewed:\par \par Echo 3/31/2023 [facility] reviewed on phone - normal ventricular function\par \par PSG 5/2019: AHI 5.9 / titrated to 9 cm H2O / compliance report showed median pressure 6.3\par HST 5/2021: AHI 16, no hypoxia\par \par PA/lat CXR R 6/2020 : clear lungs\par CT chest [facility] 10/21/20 : unremarkable\par CT chest [facility] 1/2021 : clear lungs\par CT shoulder [facility] 1/2/22 : calcified granuloma LLL\par CXR [facility] 12/2022 personally reviewed : clear lungs\par \par PFT [facility] 10/9/20: FVC 1.95L (63%), FEV1 1.66L (67%), TLC 3.01L (66%), DLCO 13.2 (62%) (62in)\par PFT [facility] 1/14/21: FVC 1.77L (60%), FEV1 1.52L (64%), TLC 2.91L (66%), DLCO 13.2 (64%) (61in)\par PFT [facility] 4/13/21: FVC 1.93L (61%), FEV1 1.66L (66%), TLC 3.18L (67%), DLCO 12.76 (59%) (63in)\par PFT [facility] 8/12/21: FVC 1.95L (66%), FEV1 1.63L (70%), TLC 3.14L (71%), DLCO 14.83 (72%) (61in)\par PFT [facility] 4/12/22: FVC 1.99L (63%), FEV1 1.61L (64%), TLC 3.14L (66%), DLCO 13.02 (61%) (63in)\par PFT 85 4/12/23: FVC 2.01L (64%), FEV1 1.69L (68%), TLC 3.43L (72%), DLCO 12.7 (66%, adj Hgb 62%) (63in)\par \par Impression:\par Dyspnea on exertion w SLE, restricted PFT, and normal lung parenchyma - PFT stable\par Mod NEVIN on CPAP\par \par Plan:\par Very many complaints, confounded by anxiety and depression.\par Biggest practical issue seems to be tolerating CPAP, sense of increased mucus, try Flonase.\par Her PFT is stable.\par Her rheumatologist inquired about CXR for the upper back pain - CXR in Dec 2022 was clear.\par Orthopnea - had normal echo, seeing cardiology at [facility].\par Return one year w PFT.

## 2023-04-12 NOTE — HISTORY OF PRESENT ILLNESS
[TextBox_4] : My patient since 8/2020 w dyspnea, restrictive physiology without parenchymal lung disease, carries dx of SLE, has mild NEVIN and insomnia, has significant depression relating to  dying in 2014 and only son dying in the  in 2018. PMD is Dr Kc, sees rheum, psychiatry. Had Covid late 2020. On CPAP.\par \par 4/12/2023: Last seen by me one year ago and did also see Dr Belle in 11/2022 for clearance for shoulder surgery. In the interim had both shoulder surgery and bunion surgery. Has hypercalcemia due to hyperparathyroidism, and vitamin D deficiency. Struggling w her chronic pain. Using CPAP but since Nov she feels like it makes her saliva thick. Her CPAP is humidified. She stopped it 3 days ago because of this saliva. Having back pain. She is on HCQ. She does have reflux and takes famotidine. Has vertigo. Orthopnea.\par \par

## 2023-04-20 ENCOUNTER — APPOINTMENT (OUTPATIENT)
Dept: INTERNAL MEDICINE | Facility: CLINIC | Age: 58
End: 2023-04-20

## 2023-05-03 ENCOUNTER — APPOINTMENT (OUTPATIENT)
Dept: NEPHROLOGY | Facility: CLINIC | Age: 58
End: 2023-05-03
Payer: MEDICAID

## 2023-05-03 VITALS
DIASTOLIC BLOOD PRESSURE: 78 MMHG | BODY MASS INDEX: 22.15 KG/M2 | WEIGHT: 125 LBS | SYSTOLIC BLOOD PRESSURE: 122 MMHG | HEART RATE: 72 BPM | HEIGHT: 63 IN

## 2023-05-03 DIAGNOSIS — R10.9 UNSPECIFIED ABDOMINAL PAIN: ICD-10-CM

## 2023-05-03 PROCEDURE — 99214 OFFICE O/P EST MOD 30 MIN: CPT

## 2023-05-03 NOTE — PHYSICAL EXAM
[General Appearance - Alert] : alert [General Appearance - In No Acute Distress] : in no acute distress [Sclera] : the sclera and conjunctiva were normal [PERRL With Normal Accommodation] : pupils were equal in size, round, and reactive to light [Outer Ear] : the ears and nose were normal in appearance [Neck Appearance] : the appearance of the neck was normal [Neck Cervical Mass (___cm)] : no neck mass was observed [Jugular Venous Distention Increased] : there was no jugular-venous distention [Auscultation Breath Sounds / Voice Sounds] : lungs were clear to auscultation bilaterally [Heart Rate And Rhythm] : heart rate was normal and rhythm regular [Heart Sounds] : normal S1 and S2 [Murmurs] : no murmurs [Heart Sounds Gallop] : no gallops [Heart Sounds Pericardial Friction Rub] : no pericardial rub [Full Pulse] : the pedal pulses are present [Edema] : there was no peripheral edema [Cervical Lymph Nodes Enlarged Posterior Bilaterally] : posterior cervical [Cervical Lymph Nodes Enlarged Anterior Bilaterally] : anterior cervical [Supraclavicular Lymph Nodes Enlarged Bilaterally] : supraclavicular [No Spinal Tenderness] : no spinal tenderness [FreeTextEntry1] : rt cva [Skin Color & Pigmentation] : normal skin color and pigmentation [Skin Turgor] : normal skin turgor [] : no rash [Deep Tendon Reflexes (DTR)] : deep tendon reflexes were 2+ and symmetric [No Focal Deficits] : no focal deficits [Oriented To Time, Place, And Person] : oriented to person, place, and time [Impaired Insight] : insight and judgment were intact [Affect] : the affect was normal

## 2023-05-03 NOTE — ASSESSMENT
[FreeTextEntry1] : 58-year-old woman with right flank pain, but no red cells on urinalysis recently.  She does have an unexplained elevation of PTH without evidence of either primary or secondary hyperparathyroidism.  I have ordered a renal ultrasound and a KUB to look carefully for kidney stone.  She did have some right CVA tenderness, but somewhat hard to interpret in view of her chronic pain and tenderness almost everywhere.

## 2023-05-03 NOTE — HISTORY OF PRESENT ILLNESS
[FreeTextEntry1] : 58-year-old woman initially referred because of a finding of 1+ protein on urinalysis in the setting of SLE.  However urine microalbumin was normal and renal function was also normal.  Urinalysis does not show red cells.  However she complains of right flank pain and her rheumatologist, Dr. Deya Koenig suggested that she has a kidney stone.  Her BP has been consistently normal.  It was 112/80 on a March 16 visit, 119/81 on a March 17 visit, and 110/80 on a pulmonary visit in April 12.  Her PTH is elevated at 125, but calcium is only 9.9 with a phosphorus of 3.0.  She has chronic pain syndrome.

## 2023-05-07 NOTE — PHYSICAL EXAM
[de-identified] : General: No acute distress, conversant, well-nourished.\par Head: Normocephalic, atraumatic\par Neck: trachea midline, FROM\par Heart: normotensive and normal rate and rhythm\par Lungs: No labored breathing\par Skin: No abrasions, no rashes, no edema\par Psych: Alert and oriented to person, place and time\par Extremities: no peripheral edema or digital cyanosis\par Gait: Uses cane.\par Vascular: warm and well perfused distally, palpable distal pulses\par \par MSK:\par Cervical Spine: \par Incision well healed\par [place] site incision c/d/i, staples removed and steri-strips applied\par \par NEURO:\par Sensation \par  Left \par C5 2/2 \par C6 2/2 \par C7 2/2 \par C8 2/2 \par T1 2/2 \par \par  Right \par C5 2/2 \par C6 2/2 \par C7 2/2 \par C8 2/2 \par T1 2/2 \par \par Motor: \par  Left \par C5 (deltoid abduction) 5/5 \par C6 (biceps flexion) 5/5 \par C7 (triceps extension) 5/5 \par C8 (finger flexion) 5/5 \par T1 (interosseous) 5/5 \par \par  Right \par C5 (deltoid abduction) 5/5 \par C6 (biceps flexion) 5/5 \par C7 (triceps extension) 5/5 \par C8 (finger flexion) 5/5 \par T1 (interosseous) 5/5 \par \par Sensation \par Left L2 - 2/2 \par Left L3 - 2/2\par Left L4 - 2/2\par Left L5 - 2/2\par Left S1 - 2/2\par \par Right L2 - 2/2 \par Right L3 - 2/2\par Right L4 - 2/2\par Right L5 - 2/2\par Right S1 - 2/2\par \par Motor: \par Left L2 (hip flexion) 5/5 \par Left L3 (knee extension) 5/5 \par Left L4 (ankle dorsiflexion) 5/5 \par Left L5 (long toe extensor) 5/5 \par Left S1 (ankle plantar flexion) 5/5\par \par Right L2 (hip flexion) 5/5 \par Right L3 (knee extension) 5/5 \par Right L4 (ankle dorsiflexion) 5/5 \par Right L5 (long toe extensor) 5/5 \par Right S1 (ankle plantar flexion) 5/5.  [de-identified] : I ordered radiographs to evaluate the patient's symptoms.\par \par Cervical 4 view radiographs obtained in the office today show no fracture or dislocation. s/p ACDF C4-C5 with hardware in appropriate alignment. No instability on dynamic series.   No change from marycarmen radiographs.\par \par Cervical MRI (2/1/23): 1. Status post ACDF at C4-C5, in anatomic alignment, with the canal and neural foramina well decompressed at the surgical level\par 2. At C3-C4, C5-C6 and C6-C7 there is marked disc space narrowing with posterior disc-osteophyte complexes deforming the thecal sac associated with left foraminal narrowing at C5-C6 and C6-C7\par 3. Reversal of the upper cervical lordosis

## 2023-05-07 NOTE — HISTORY OF PRESENT ILLNESS
[de-identified] : 58 year old female followup s/p C4-C5 ACDF with iliac crest autograft (10/5/21) for cervical cord compression. She denies radicular pain, difficulty breathing, recent illness, fevers, saddle anesthesia, urinary retention or fecal incontinence.  She had her left shoulder surgery with Dr. Pacheco. She reports worsened left shoulder pain since the surgery. She sees a neurologist for headaches.  Her symptoms have improved after a steroid dose pack.

## 2023-05-07 NOTE — ASSESSMENT
[FreeTextEntry1] : 58 year old female followup s/p C4-C5 ACDF with iliac crest autograft (10/5/21) for cervical cord compression.  She has no radicular pain. She is at her neurologic baseline.  She is recovering from left shoulder surgery.  Her symptoms have improved with a steroid dose pack.  She can resume PT. She can consider spinal injections.  She can take tizanidine as needed. She will followup in 3 months. We discussed red flag symptoms that would require emergent evaluation. She knows to call with any questions or concerns or if her symptoms acutely worsen. 88

## 2023-05-08 ENCOUNTER — APPOINTMENT (OUTPATIENT)
Dept: ENDOCRINOLOGY | Facility: CLINIC | Age: 58
End: 2023-05-08

## 2023-07-05 ENCOUNTER — APPOINTMENT (OUTPATIENT)
Dept: ORTHOPEDIC SURGERY | Facility: CLINIC | Age: 58
End: 2023-07-05
Payer: OTHER MISCELLANEOUS

## 2023-07-05 PROCEDURE — 99214 OFFICE O/P EST MOD 30 MIN: CPT

## 2023-07-05 PROCEDURE — 72050 X-RAY EXAM NECK SPINE 4/5VWS: CPT

## 2023-07-05 NOTE — HISTORY OF PRESENT ILLNESS
[de-identified] : 58 year old female followup s/p C4-C5 ACDF with iliac crest autograft (10/5/21) for cervical cord compression. She denies radicular pain, difficulty breathing, recent illness, fevers, saddle anesthesia, urinary retention or fecal incontinence.  She had her left shoulder surgery with Dr. Pacheco. She reports worsened left shoulder pain since the surgery. She sees a neurologist for headaches.  She has continued posterior neck pain.  She is seeing pain management.

## 2023-07-05 NOTE — ASSESSMENT
[FreeTextEntry1] : 58 year old female followup s/p C4-C5 ACDF with iliac crest autograft (10/5/21) for cervical cord compression.  She has no radicular pain. She is at her neurologic baseline.  She is recovering from left shoulder surgery.  She continues to have posterior neck pain.  She can resume PT. She would benefit from medial branch blocks and RFA.  She will followup with pain management..  She can take tizanidine as needed. She will followup in 3 months. We discussed red flag symptoms that would require emergent evaluation. She knows to call with any questions or concerns or if her symptoms acutely worsen.

## 2023-07-05 NOTE — PHYSICAL EXAM
[de-identified] : General: No acute distress, conversant, well-nourished.\par Head: Normocephalic, atraumatic\par Neck: trachea midline, FROM\par Heart: normotensive and normal rate and rhythm\par Lungs: No labored breathing\par Skin: No abrasions, no rashes, no edema\par Psych: Alert and oriented to person, place and time\par Extremities: no peripheral edema or digital cyanosis\par Gait: Uses cane.\par Vascular: warm and well perfused distally, palpable distal pulses\par \par MSK:\par Cervical Spine: \par Incision well healed\par [place] site incision c/d/i, staples removed and steri-strips applied\par \par NEURO:\par Sensation \par  Left \par C5 2/2 \par C6 2/2 \par C7 2/2 \par C8 2/2 \par T1 2/2 \par \par  Right \par C5 2/2 \par C6 2/2 \par C7 2/2 \par C8 2/2 \par T1 2/2 \par \par Motor: \par  Left \par C5 (deltoid abduction) 5/5 \par C6 (biceps flexion) 5/5 \par C7 (triceps extension) 5/5 \par C8 (finger flexion) 5/5 \par T1 (interosseous) 5/5 \par \par  Right \par C5 (deltoid abduction) 5/5 \par C6 (biceps flexion) 5/5 \par C7 (triceps extension) 5/5 \par C8 (finger flexion) 5/5 \par T1 (interosseous) 5/5 \par \par Sensation \par Left L2 - 2/2 \par Left L3 - 2/2\par Left L4 - 2/2\par Left L5 - 2/2\par Left S1 - 2/2\par \par Right L2 - 2/2 \par Right L3 - 2/2\par Right L4 - 2/2\par Right L5 - 2/2\par Right S1 - 2/2\par \par Motor: \par Left L2 (hip flexion) 5/5 \par Left L3 (knee extension) 5/5 \par Left L4 (ankle dorsiflexion) 5/5 \par Left L5 (long toe extensor) 5/5 \par Left S1 (ankle plantar flexion) 5/5\par \par Right L2 (hip flexion) 5/5 \par Right L3 (knee extension) 5/5 \par Right L4 (ankle dorsiflexion) 5/5 \par Right L5 (long toe extensor) 5/5 \par Right S1 (ankle plantar flexion) 5/5.  [de-identified] : I ordered radiographs to evaluate the patient's symptoms.\par \par Cervical 4 view radiographs obtained in the office today show no fracture or dislocation. s/p ACDF C4-C5 with hardware in appropriate alignment. No instability on dynamic series.   No change from marycarmen radiographs.\par \par Cervical MRI (2/1/23): 1. Status post ACDF at C4-C5, in anatomic alignment, with the canal and neural foramina well decompressed at the surgical level\par 2. At C3-C4, C5-C6 and C6-C7 there is marked disc space narrowing with posterior disc-osteophyte complexes deforming the thecal sac associated with left foraminal narrowing at C5-C6 and C6-C7\par 3. Reversal of the upper cervical lordosis

## 2023-08-10 NOTE — ED ADULT TRIAGE NOTE - WEIGHT IN KG
Quality 226: Preventive Care And Screening: Tobacco Use: Screening And Cessation Intervention: Tobacco Screening not Performed
Detail Level: Detailed
Quality 130: Documentation Of Current Medications In The Medical Record: Current Medications Documented
Quality 110: Preventive Care And Screening: Influenza Immunization: Influenza immunization was not ordered or administered, reason not given
56.7

## 2023-08-15 NOTE — ED PROVIDER NOTE - RATE
74 Albendazole Counseling:  I discussed with the patient the risks of albendazole including but not limited to cytopenia, kidney damage, nausea/vomiting and severe allergy.  The patient understands that this medication is being used in an off-label manner.

## 2023-09-06 NOTE — OCCUPATIONAL THERAPY INITIAL EVALUATION ADULT - GROOMING, PREVIOUS LEVEL OF FUNCTION, OT EVAL
Writer reviewed discharge instructions, medications, and follow-up with Neurology; patient verbalized understanding. Patient's IV removed with no complications with dressing in place. Patient stable, ambulatory with steady gait, GCS 15, no signs/ symptoms of acute distress, respirations unlabored, and with friend. Patient discharged with belongings.       Murray Toribio RN  09/05/23 2044
independent

## 2023-09-08 ENCOUNTER — APPOINTMENT (OUTPATIENT)
Dept: ENDOCRINOLOGY | Facility: CLINIC | Age: 58
End: 2023-09-08
Payer: MEDICAID

## 2023-09-08 VITALS
HEIGHT: 63 IN | SYSTOLIC BLOOD PRESSURE: 117 MMHG | TEMPERATURE: 97.5 F | HEART RATE: 66 BPM | OXYGEN SATURATION: 98 % | WEIGHT: 130 LBS | BODY MASS INDEX: 23.04 KG/M2 | DIASTOLIC BLOOD PRESSURE: 79 MMHG

## 2023-09-08 DIAGNOSIS — E23.6 OTHER DISORDERS OF PITUITARY GLAND: ICD-10-CM

## 2023-09-08 DIAGNOSIS — E34.9 ENDOCRINE DISORDER, UNSPECIFIED: ICD-10-CM

## 2023-09-08 PROCEDURE — 99214 OFFICE O/P EST MOD 30 MIN: CPT

## 2023-09-08 NOTE — REASON FOR VISIT
[Follow - Up] : a follow-up visit [Pituitary Evaluation/ Disorder] : pituitary evaluation/disorder [Hyperparathyroidism] : hyperparathyroidism

## 2023-09-08 NOTE — CONSULT LETTER
[Dear  ___] : Dear  [unfilled], [Courtesy Letter:] : I had the pleasure of seeing your patient, [unfilled], in my office today. [Please see my note below.] : Please see my note below. [Consult Closing:] : Thank you very much for allowing me to participate in the care of this patient.  If you have any questions, please do not hesitate to contact me. [Sincerely,] : Sincerely, [FreeTextEntry3] : Marta Bergman MD

## 2023-09-08 NOTE — REVIEW OF SYSTEMS
[Fatigue] : no fatigue [Chest Pain] : no chest pain [Palpitations] : no palpitations [Shortness Of Breath] : no shortness of breath [Nausea] : no nausea [Vomiting] : no vomiting

## 2023-09-08 NOTE — PHYSICAL EXAM
[Alert] : alert [Well Nourished] : well nourished [No Acute Distress] : no acute distress [EOMI] : extra ocular movement intact [Normal Hearing] : hearing was normal [No Respiratory Distress] : no respiratory distress [Clear to Auscultation] : lungs were clear to auscultation bilaterally [Normal S1, S2] : normal S1 and S2 [Normal Rate] : heart rate was normal [No Edema] : no peripheral edema [Normal Bowel Sounds] : normal bowel sounds [Soft] : abdomen soft [No Motor Deficits] : the motor exam was normal [Normal Affect] : the affect was normal [Normal Insight/Judgement] : insight and judgment were intact [Normal Mood] : the mood was normal

## 2023-09-08 NOTE — HISTORY OF PRESENT ILLNESS
[FreeTextEntry1] : Patient is a 57 yo woman following up for hyperparathyroidism Patient saw her rheumatologist and had several symptoms. After cervical surgery she complained of some difficulty and muscle aches. Her rheumatologist "checked everything" and PTH was elevated. Osteoporosis was being treated by rheumatology with alendronate. She was on it from [id number] and rheumatology discontinued it as bone were looking improved. No history of fractures. No history of kidney stones Drinks lots of water and has lots of neck pain/aches which she is seeing her rheumatologist for. On endocrine work up, she had normocalcemic hyperparathyroidism  Patient with known Rathke's cleft cyst, imaging done April 2022

## 2023-09-08 NOTE — ASSESSMENT
[FreeTextEntry1] : Patient is a 57 yo woman with hyperparathyroidism and Rahke's cleft cyst here for follow u  1. Normocalcemic hyperparathyroidism -the patient has normal calcium levels with elevated PTH of 125.  -she has no history of fractures or kidney stones -was being treated for osteoporosis by rheumatology but stopped after 1 year on alendronate. Surveillance DXA consistent with T scores in osteopenic ranges; recent bone density from March 2023 reviewed on phone and results recorded above -the patient had cervical neck surgery and has subsequent pain/aches that she communicates.  Patient adamant that "no one touches my neck." States her cervical surgery affected her quality of life and she declines any surgical intervention for elevated PTH at this time -indications for surgical management of primary hyperparathyroidism including calcium > 1 mg/dl above ULN, GFR < 60, age, presence of symptoms, osteoporosis and kidney stones -the future potential surgical management was discussed but patient strongly declined surgery especially if it's going to be near her neck region. -repeat D, PTH, calcium levels today  2. Rathke's cleft cyst -seen on MRI from April 2022, no pituitary adenoma -no galactorrhea on exam -repeat MRI now then after this year can consider imaging in 3,5 years (will defer to neurology for imaging frequency) -referral for MRI given today. She has an appointment with neuro and will discuss at next week's visit -check prolactin, cortisol, free T4 levels today -these tests were checked and normal in the past.  If normal this year, we will monitor clinically  Follow up in 1 year

## 2023-09-11 LAB
24R-OH-CALCIDIOL SERPL-MCNC: 91.5 PG/ML
25(OH)D3 SERPL-MCNC: 52 NG/ML
ACTH STIM CORTISOL BASELINE: 5.7 UG/DL
ALBUMIN SERPL ELPH-MCNC: 4.5 G/DL
ALP BLD-CCNC: 68 U/L
ALT SERPL-CCNC: 8 U/L
ANION GAP SERPL CALC-SCNC: 11 MMOL/L
AST SERPL-CCNC: 18 U/L
BILIRUB SERPL-MCNC: 0.4 MG/DL
BUN SERPL-MCNC: 8 MG/DL
CALCIUM SERPL-MCNC: 9.7 MG/DL
CALCIUM SERPL-MCNC: 9.7 MG/DL
CHLORIDE SERPL-SCNC: 106 MMOL/L
CO2 SERPL-SCNC: 26 MMOL/L
CORTIS SERPL-MCNC: 5.9 UG/DL
CREAT SERPL-MCNC: 0.59 MG/DL
EGFR: 104 ML/MIN/1.73M2
GLUCOSE SERPL-MCNC: 85 MG/DL
MAGNESIUM SERPL-MCNC: 2 MG/DL
PARATHYROID HORMONE INTACT: 34 PG/ML
PHOSPHATE SERPL-MCNC: 4.1 MG/DL
POTASSIUM SERPL-SCNC: 4.2 MMOL/L
PROLACTIN SERPL-MCNC: 5.9 NG/ML
PROT SERPL-MCNC: 7.3 G/DL
SODIUM SERPL-SCNC: 143 MMOL/L
T4 FREE SERPL-MCNC: 0.9 NG/DL
TSH SERPL-ACNC: 1.26 UIU/ML

## 2023-09-12 ENCOUNTER — RESULT REVIEW (OUTPATIENT)
Age: 58
End: 2023-09-12

## 2023-09-12 ENCOUNTER — APPOINTMENT (OUTPATIENT)
Dept: NEUROLOGY | Facility: CLINIC | Age: 58
End: 2023-09-12
Payer: MEDICAID

## 2023-09-12 ENCOUNTER — NON-APPOINTMENT (OUTPATIENT)
Age: 58
End: 2023-09-12

## 2023-09-12 VITALS
HEART RATE: 70 BPM | SYSTOLIC BLOOD PRESSURE: 113 MMHG | TEMPERATURE: 97.3 F | DIASTOLIC BLOOD PRESSURE: 80 MMHG | BODY MASS INDEX: 23.04 KG/M2 | WEIGHT: 130 LBS | HEIGHT: 63 IN | OXYGEN SATURATION: 98 %

## 2023-09-12 PROCEDURE — 99214 OFFICE O/P EST MOD 30 MIN: CPT

## 2023-09-12 RX ORDER — ROSUVASTATIN CALCIUM 5 MG/1
5 TABLET, FILM COATED ORAL DAILY
Refills: 0 | Status: ACTIVE | COMMUNITY

## 2023-09-12 RX ORDER — TIZANIDINE 2 MG/1
2 TABLET ORAL
Qty: 40 | Refills: 1 | Status: DISCONTINUED | COMMUNITY
Start: 2022-08-12 | End: 2023-09-12

## 2023-09-14 ENCOUNTER — OUTPATIENT (OUTPATIENT)
Dept: OUTPATIENT SERVICES | Facility: HOSPITAL | Age: 58
LOS: 1 days | End: 2023-09-14
Payer: COMMERCIAL

## 2023-09-14 ENCOUNTER — APPOINTMENT (OUTPATIENT)
Dept: MRI IMAGING | Facility: HOSPITAL | Age: 58
End: 2023-09-14

## 2023-09-14 PROCEDURE — A9585: CPT

## 2023-09-14 PROCEDURE — 70553 MRI BRAIN STEM W/O & W/DYE: CPT

## 2023-09-14 PROCEDURE — 70553 MRI BRAIN STEM W/O & W/DYE: CPT | Mod: 26

## 2023-09-18 LAB
MONOMERIC PROLACTIN (ICMA)*: 5.84 NG/ML
PERCENT MACROPROLACTIN: 10 %
PROLACTIN, SERUM (ICMA)*: 6.46 NG/ML

## 2023-09-22 ENCOUNTER — RX RENEWAL (OUTPATIENT)
Age: 58
End: 2023-09-22

## 2023-10-06 ENCOUNTER — APPOINTMENT (OUTPATIENT)
Dept: OPHTHALMOLOGY | Facility: CLINIC | Age: 58
End: 2023-10-06
Payer: MEDICAID

## 2023-10-06 ENCOUNTER — NON-APPOINTMENT (OUTPATIENT)
Age: 58
End: 2023-10-06

## 2023-10-06 PROCEDURE — 92133 CPTRZD OPH DX IMG PST SGM ON: CPT

## 2023-10-06 PROCEDURE — 92083 EXTENDED VISUAL FIELD XM: CPT

## 2023-10-06 PROCEDURE — 92015 DETERMINE REFRACTIVE STATE: CPT | Mod: NC

## 2023-10-10 ENCOUNTER — APPOINTMENT (OUTPATIENT)
Dept: ORTHOPEDIC SURGERY | Facility: CLINIC | Age: 58
End: 2023-10-10
Payer: OTHER MISCELLANEOUS

## 2023-10-10 DIAGNOSIS — M54.2 CERVICALGIA: ICD-10-CM

## 2023-10-10 PROCEDURE — 99214 OFFICE O/P EST MOD 30 MIN: CPT

## 2023-10-10 PROCEDURE — 72050 X-RAY EXAM NECK SPINE 4/5VWS: CPT

## 2023-11-15 NOTE — ED PROVIDER NOTE - INTERPRETATION
The patient's goals for the shift include      The clinical goals for the shift include address pain level; update plans for discharge; reassess PT/OT needs; remain HDS    Over the shift, the patient did not make progress toward the following goals. Barriers to progression include . Recommendations to address these barriers include .     normal sinus rhythm

## 2023-11-24 NOTE — ED ADULT NURSE NOTE - CAS EDN DISCHARGE ASSESSMENT
Dialed 's number TIFF @ ([phone]) for patient to speak w/ him. patient informed  that she is going to be admitted to the hospital.   Alert and oriented to person, place and time

## 2024-01-02 ENCOUNTER — APPOINTMENT (OUTPATIENT)
Dept: NEPHROLOGY | Facility: CLINIC | Age: 59
End: 2024-01-02
Payer: MEDICAID

## 2024-01-02 VITALS
SYSTOLIC BLOOD PRESSURE: 112 MMHG | HEIGHT: 63 IN | DIASTOLIC BLOOD PRESSURE: 73 MMHG | BODY MASS INDEX: 22.68 KG/M2 | WEIGHT: 128 LBS

## 2024-01-02 DIAGNOSIS — G47.33 OBSTRUCTIVE SLEEP APNEA (ADULT) (PEDIATRIC): ICD-10-CM

## 2024-01-02 DIAGNOSIS — R80.9 PROTEINURIA, UNSPECIFIED: ICD-10-CM

## 2024-01-02 DIAGNOSIS — M32.9 SYSTEMIC LUPUS ERYTHEMATOSUS, UNSPECIFIED: ICD-10-CM

## 2024-01-02 LAB
ANION GAP SERPL CALC-SCNC: 13 MMOL/L
APPEARANCE: CLEAR
BACTERIA: NEGATIVE /HPF
BILIRUBIN URINE: NEGATIVE
BLOOD URINE: NEGATIVE
BUN SERPL-MCNC: 11 MG/DL
C3 SERPL-MCNC: 105 MG/DL
C4 SERPL-MCNC: 27 MG/DL
CALCIUM SERPL-MCNC: 9.2 MG/DL
CAST: 0 /LPF
CHLORIDE SERPL-SCNC: 107 MMOL/L
CO2 SERPL-SCNC: 24 MMOL/L
COLOR: YELLOW
CREAT SERPL-MCNC: 0.65 MG/DL
CREAT SPEC-SCNC: 78 MG/DL
CYSTATIN C SERPL-MCNC: 0.77 MG/L
EGFR: 101 ML/MIN/1.73M2
EPITHELIAL CELLS: 1 /HPF
ERYTHROCYTE [SEDIMENTATION RATE] IN BLOOD BY WESTERGREN METHOD: 6 MM/HR
GFR/BSA.PRED SERPLBLD CYS-BASED-ARV: 100 ML/MIN/1.73M2
GLUCOSE QUALITATIVE U: NEGATIVE MG/DL
GLUCOSE SERPL-MCNC: 84 MG/DL
KETONES URINE: NEGATIVE MG/DL
LEUKOCYTE ESTERASE URINE: ABNORMAL
MICROALBUMIN 24H UR DL<=1MG/L-MCNC: <1.2 MG/DL
MICROALBUMIN/CREAT 24H UR-RTO: NORMAL MG/G
MICROSCOPIC-UA: NORMAL
NITRITE URINE: NEGATIVE
PH URINE: 7
POTASSIUM SERPL-SCNC: 4.9 MMOL/L
PROTEIN URINE: NEGATIVE MG/DL
RED BLOOD CELLS URINE: 2 /HPF
SODIUM SERPL-SCNC: 143 MMOL/L
SPECIFIC GRAVITY URINE: 1.02
UROBILINOGEN URINE: 0.2 MG/DL
WHITE BLOOD CELLS URINE: 1 /HPF

## 2024-01-02 PROCEDURE — 99214 OFFICE O/P EST MOD 30 MIN: CPT

## 2024-01-02 NOTE — CONSULT LETTER
[Dear  ___] : Dear  [unfilled], [Consult Letter:] : I had the pleasure of evaluating your patient, [unfilled]. [Please see my note below.] : Please see my note below. [Consult Closing:] : Thank you very much for allowing me to participate in the care of this patient.  If you have any questions, please do not hesitate to contact me. [Sincerely,] : Sincerely, [FreeTextEntry2] : Dr Kc [DrAfua  ___] : Dr. DESOUZA

## 2024-01-02 NOTE — ASSESSMENT
[FreeTextEntry1] : 59-year-old woman with a history of SLE, but normal renal function, no microalbuminuria, normal BP, normal markers such as C3, C4, and sed rate.  In view of her excellent renal/hypertension status, I have suggested a revisit in 1 year unless something new arises.

## 2024-01-02 NOTE — HISTORY OF PRESENT ILLNESS
[FreeTextEntry1] : 58-year-old woman referred by Dr. Kc because of 1+ proteinuria on the urinalysis in the context of history of SLE.  However urine microalbumin was normal then and remains normal.  Renal function is also normal with a GFR of 100, creatinine 0.65, normal urinalysis, normal C3 and C4 complement, normal sed rate of 6, potassium 4.9.  Her BP has never been an issue.  When I last saw her in May, BP here was 122/78, and it was 135/85 when she saw Dr. ROBINS in late November.  Her rheumatologist is Dr. Deya Alan.  Her only complaints relate to arthralgias involving everything from her neck where she has had cervical spine surgery, to both hands and virtually all joints.  Her left thumb is particularly bothersome.  She will be seeing rheumatology and Dr Kc later this month.  I have reassured her that her kidney status is excellent and BP is normal.

## 2024-01-03 ENCOUNTER — APPOINTMENT (OUTPATIENT)
Dept: ORTHOPEDIC SURGERY | Facility: CLINIC | Age: 59
End: 2024-01-03
Payer: OTHER MISCELLANEOUS

## 2024-01-03 PROCEDURE — 72050 X-RAY EXAM NECK SPINE 4/5VWS: CPT

## 2024-01-03 PROCEDURE — 99214 OFFICE O/P EST MOD 30 MIN: CPT

## 2024-01-03 RX ORDER — TIZANIDINE 2 MG/1
2 TABLET ORAL
Qty: 80 | Refills: 0 | Status: ACTIVE | COMMUNITY
Start: 2024-01-03 | End: 1900-01-01

## 2024-01-03 NOTE — REASON FOR VISIT
[Follow-Up Visit] : a follow-up visit for [Workers' Comp: Date of Injury: _______] : This visit is related to worker's compensation. Date of Injury: [unfilled] [Neck Pain] : neck pain

## 2024-01-03 NOTE — PHYSICAL EXAM
[de-identified] : General: No acute distress, conversant, well-nourished. Head: Normocephalic, atraumatic Neck: trachea midline, FROM Heart: normotensive and normal rate and rhythm Lungs: No labored breathing Skin: No abrasions, no rashes, no edema Psych: Alert and oriented to person, place and time Extremities: no peripheral edema or digital cyanosis Gait: Uses cane. Vascular: warm and well perfused distally, palpable distal pulses  MSK: Cervical Spine:  Incision well healed [place] site incision c/d/i, staples removed and steri-strips applied  NEURO: Sensation   Left  C5 2/2  C6 2/2  C7 2/2  C8 2/2  T1 2/2    Right  C5 2/2  C6 2/2  C7 2/2  C8 2/2  T1 2/2   Motor:   Left  C5 (deltoid abduction) 5/5  C6 (biceps flexion) 5/5  C7 (triceps extension) 5/5  C8 (finger flexion) 5/5  T1 (interosseous) 5/5    Right  C5 (deltoid abduction) 5/5  C6 (biceps flexion) 5/5  C7 (triceps extension) 5/5  C8 (finger flexion) 5/5  T1 (interosseous) 5/5   Sensation  Left L2 - 2/2  Left L3 - 2/2 Left L4 - 2/2 Left L5 - 2/2 Left S1 - 2/2  Right L2 - 2/2  Right L3 - 2/2 Right L4 - 2/2 Right L5 - 2/2 Right S1 - 2/2  Motor:  Left L2 (hip flexion) 5/5  Left L3 (knee extension) 5/5  Left L4 (ankle dorsiflexion) 5/5  Left L5 (long toe extensor) 5/5  Left S1 (ankle plantar flexion) 5/5  Right L2 (hip flexion) 5/5  Right L3 (knee extension) 5/5  Right L4 (ankle dorsiflexion) 5/5  Right L5 (long toe extensor) 5/5  Right S1 (ankle plantar flexion) 5/5.  [de-identified] : I ordered radiographs to evaluate the patient's symptoms.  Cervical 4 view radiographs obtained in the office today show no fracture or dislocation. s/p ACDF C4-C5 with hardware in appropriate alignment. No instability on dynamic series.   No change from marycarmen radiographs.  Cervical MRI (2/1/23): 1. Status post ACDF at C4-C5, in anatomic alignment, with the canal and neural foramina well decompressed at the surgical level 2. At C3-C4, C5-C6 and C6-C7 there is marked disc space narrowing with posterior disc-osteophyte complexes deforming the thecal sac associated with left foraminal narrowing at C5-C6 and C6-C7 3. Reversal of the upper cervical lordosis

## 2024-01-03 NOTE — HISTORY OF PRESENT ILLNESS
[de-identified] : 58 year old female followup s/p C4-C5 ACDF with iliac crest autograft (10/5/21) for cervical cord compression. She denies radicular pain, difficulty breathing, recent illness, fevers, saddle anesthesia, urinary retention or fecal incontinence.  She had her left shoulder surgery with Dr. Pacheco. She reports worsened left shoulder pain since the surgery. She sees a neurologist for headaches. Since her last visit her pain has continued. She says the cold weather makes it worse. She is awaiting approval for a left shoulder replacement.

## 2024-01-03 NOTE — ASSESSMENT
[FreeTextEntry1] : 58 year old female followup s/p C4-C5 ACDF with iliac crest autograft (10/5/21) for cervical cord compression. She denies radicular pain, difficulty breathing, recent illness, fevers, saddle anesthesia, urinary retention or fecal incontinence.  She had her left shoulder surgery with Dr. Pacheco. She reports worsened left shoulder pain since the surgery. She sees a neurologist for headaches. Since her last visit her pain has continued. She says the cold weather makes it worse. She is awaiting approval for a left shoulder replacement.  She can resume PT for her neck. The patient was given a referral for physical therapy.  Tizanidine prn.  Can consider spinal injections. F/U 3 months. We discussed red flag symptoms that would require emergent evaluation. She knows to call with any questions or concerns or if her symptoms acutely worsen.

## 2024-01-23 ENCOUNTER — APPOINTMENT (OUTPATIENT)
Dept: NEUROLOGY | Facility: CLINIC | Age: 59
End: 2024-01-23

## 2024-02-05 NOTE — ED ADULT TRIAGE NOTE - MEANS OF ARRIVAL
Pt. Called the office and was speaking with PSR Kim and he stated he would not be signing the treatment consent if he could not make changes to the form. Kim transferred the call to me and I did inform the pt. That no changes can be made to the form and if a pt. Doesn't sign the treatment consent then the pt. Cannot be seen by the doctor. He stated he would be finding a new doctor and to let Dr. Cates know why he was leaving the office. I did inform Dr. Cates that pt. Would be leaving the office because he did not want to sign the treatment consent.    ambulatory

## 2024-02-07 ENCOUNTER — APPOINTMENT (OUTPATIENT)
Dept: NEUROLOGY | Facility: CLINIC | Age: 59
End: 2024-02-07
Payer: MEDICAID

## 2024-02-07 DIAGNOSIS — R42 DIZZINESS AND GIDDINESS: ICD-10-CM

## 2024-02-07 PROCEDURE — G2211 COMPLEX E/M VISIT ADD ON: CPT | Mod: NC,1L,95

## 2024-02-07 PROCEDURE — 99214 OFFICE O/P EST MOD 30 MIN: CPT | Mod: 95

## 2024-02-07 NOTE — HISTORY OF PRESENT ILLNESS
[FreeTextEntry1] : Follow up  PMHx Pt was referred for evaluation of headaches. She was previously seen by Dr. Mead and Dr Amaya  From Dr. Mead's notes: 54 yo W HA. hx of  Her main complaint is pain throughout her body, although she also endorses weakness and numbness She reports history of pituitary tumor, fibromyalgia, carpal tunnel syndrome, migraine headaches.  She is on cymbalta, prednisone, sertraline She has seen a neuro-ophthalmologist for visual problems, possibly related to plaquenil, but also states she cannot find why her vision is getting worse.  ESR 6, , RF / CCP / SSA/B negative, B12/folate normal MRI brain 2017 unremarkable   Pt states that headaches started more than 7 years ago. HAs are about 4 times a week. HAs described as sharp, frontal, pressure like (like "little needles"), at times associated with sensitivity to light and noise. Pt failed Topamax and Magnesium. Pt states that she needed to reduce the hours of work due to headaches. She states that she can not tolerate NSAIDs. She states that she is not taking any meds for acute headaches but tries to lie down and use cold compresses. She reports Lactorrhea; the results of recent MRI are not available. 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    Meds for Migraine TOP Celebrex Neurontin Imitrex  Never took:  Nortriptyline Propranolol Acido Valproico    HPI   10/4/2022  Amitriptyline was increased to 50 mg in 2022 and it did help a bit. w HA Vertigo in AM or almost qam. The room spins around aw nausea.   Not sleeping well neck pain not better,  Doing PT for neck Ophthal shows binasal visual defect by Dr. Earl   HPI    2024 Stress Father  a few months ago Vertigo same Nurtec works great but insurance doesnt pay for it.

## 2024-03-03 ENCOUNTER — EMERGENCY (EMERGENCY)
Facility: HOSPITAL | Age: 59
LOS: 1 days | Discharge: HOME | End: 2024-03-03
Attending: EMERGENCY MEDICINE | Admitting: EMERGENCY MEDICINE
Payer: COMMERCIAL

## 2024-03-03 VITALS
HEART RATE: 90 BPM | DIASTOLIC BLOOD PRESSURE: 76 MMHG | SYSTOLIC BLOOD PRESSURE: 134 MMHG | RESPIRATION RATE: 18 BRPM | OXYGEN SATURATION: 98 % | TEMPERATURE: 98 F

## 2024-03-03 VITALS
SYSTOLIC BLOOD PRESSURE: 124 MMHG | OXYGEN SATURATION: 98 % | RESPIRATION RATE: 18 BRPM | TEMPERATURE: 98 F | DIASTOLIC BLOOD PRESSURE: 76 MMHG | HEART RATE: 71 BPM

## 2024-03-03 LAB
ALBUMIN SERPL ELPH-MCNC: 4.4 G/DL — SIGNIFICANT CHANGE UP (ref 3.3–5)
ALP SERPL-CCNC: 64 U/L — SIGNIFICANT CHANGE UP (ref 40–120)
ALT FLD-CCNC: 10 U/L — SIGNIFICANT CHANGE UP (ref 10–45)
ANION GAP SERPL CALC-SCNC: 9 MMOL/L — SIGNIFICANT CHANGE UP (ref 5–17)
AST SERPL-CCNC: 19 U/L — SIGNIFICANT CHANGE UP (ref 10–40)
BASOPHILS # BLD AUTO: 0.02 K/UL — SIGNIFICANT CHANGE UP (ref 0–0.2)
BASOPHILS NFR BLD AUTO: 0.4 % — SIGNIFICANT CHANGE UP (ref 0–2)
BILIRUB SERPL-MCNC: 0.3 MG/DL — SIGNIFICANT CHANGE UP (ref 0.2–1.2)
BUN SERPL-MCNC: 12 MG/DL — SIGNIFICANT CHANGE UP (ref 7–23)
CALCIUM SERPL-MCNC: 9.3 MG/DL — SIGNIFICANT CHANGE UP (ref 8.4–10.5)
CHLORIDE SERPL-SCNC: 106 MMOL/L — SIGNIFICANT CHANGE UP (ref 96–108)
CO2 SERPL-SCNC: 29 MMOL/L — SIGNIFICANT CHANGE UP (ref 22–31)
CREAT SERPL-MCNC: 0.62 MG/DL — SIGNIFICANT CHANGE UP (ref 0.5–1.3)
D DIMER BLD IA.RAPID-MCNC: 313 NG/ML DDU — HIGH
EGFR: 103 ML/MIN/1.73M2 — SIGNIFICANT CHANGE UP
EOSINOPHIL # BLD AUTO: 0.08 K/UL — SIGNIFICANT CHANGE UP (ref 0–0.5)
EOSINOPHIL NFR BLD AUTO: 1.6 % — SIGNIFICANT CHANGE UP (ref 0–6)
GLUCOSE SERPL-MCNC: 91 MG/DL — SIGNIFICANT CHANGE UP (ref 70–99)
HCT VFR BLD CALC: 32.4 % — LOW (ref 34.5–45)
HGB BLD-MCNC: 10.4 G/DL — LOW (ref 11.5–15.5)
IMM GRANULOCYTES NFR BLD AUTO: 0.2 % — SIGNIFICANT CHANGE UP (ref 0–0.9)
LYMPHOCYTES # BLD AUTO: 2 K/UL — SIGNIFICANT CHANGE UP (ref 1–3.3)
LYMPHOCYTES # BLD AUTO: 39.7 % — SIGNIFICANT CHANGE UP (ref 13–44)
MAGNESIUM SERPL-MCNC: 2.1 MG/DL — SIGNIFICANT CHANGE UP (ref 1.6–2.6)
MCHC RBC-ENTMCNC: 30.2 PG — SIGNIFICANT CHANGE UP (ref 27–34)
MCHC RBC-ENTMCNC: 32.1 GM/DL — SIGNIFICANT CHANGE UP (ref 32–36)
MCV RBC AUTO: 94.2 FL — SIGNIFICANT CHANGE UP (ref 80–100)
MONOCYTES # BLD AUTO: 0.52 K/UL — SIGNIFICANT CHANGE UP (ref 0–0.9)
MONOCYTES NFR BLD AUTO: 10.3 % — SIGNIFICANT CHANGE UP (ref 2–14)
NEUTROPHILS # BLD AUTO: 2.41 K/UL — SIGNIFICANT CHANGE UP (ref 1.8–7.4)
NEUTROPHILS NFR BLD AUTO: 47.8 % — SIGNIFICANT CHANGE UP (ref 43–77)
NRBC # BLD: 0 /100 WBCS — SIGNIFICANT CHANGE UP (ref 0–0)
PLATELET # BLD AUTO: 255 K/UL — SIGNIFICANT CHANGE UP (ref 150–400)
POTASSIUM SERPL-MCNC: 4.3 MMOL/L — SIGNIFICANT CHANGE UP (ref 3.5–5.3)
POTASSIUM SERPL-SCNC: 4.3 MMOL/L — SIGNIFICANT CHANGE UP (ref 3.5–5.3)
PROT SERPL-MCNC: 7.2 G/DL — SIGNIFICANT CHANGE UP (ref 6–8.3)
RBC # BLD: 3.44 M/UL — LOW (ref 3.8–5.2)
RBC # FLD: 12.4 % — SIGNIFICANT CHANGE UP (ref 10.3–14.5)
SODIUM SERPL-SCNC: 144 MMOL/L — SIGNIFICANT CHANGE UP (ref 135–145)
TROPONIN T, HIGH SENSITIVITY RESULT: <6 NG/L — SIGNIFICANT CHANGE UP (ref 0–51)
WBC # BLD: 5.04 K/UL — SIGNIFICANT CHANGE UP (ref 3.8–10.5)
WBC # FLD AUTO: 5.04 K/UL — SIGNIFICANT CHANGE UP (ref 3.8–10.5)

## 2024-03-03 PROCEDURE — 80053 COMPREHEN METABOLIC PANEL: CPT

## 2024-03-03 PROCEDURE — 99285 EMERGENCY DEPT VISIT HI MDM: CPT

## 2024-03-03 PROCEDURE — 93005 ELECTROCARDIOGRAM TRACING: CPT

## 2024-03-03 PROCEDURE — 96375 TX/PRO/DX INJ NEW DRUG ADDON: CPT | Mod: XU

## 2024-03-03 PROCEDURE — 96374 THER/PROPH/DIAG INJ IV PUSH: CPT | Mod: XU

## 2024-03-03 PROCEDURE — 36415 COLL VENOUS BLD VENIPUNCTURE: CPT

## 2024-03-03 PROCEDURE — 71045 X-RAY EXAM CHEST 1 VIEW: CPT

## 2024-03-03 PROCEDURE — 99285 EMERGENCY DEPT VISIT HI MDM: CPT | Mod: 25

## 2024-03-03 PROCEDURE — 93010 ELECTROCARDIOGRAM REPORT: CPT

## 2024-03-03 PROCEDURE — 71275 CT ANGIOGRAPHY CHEST: CPT | Mod: MC

## 2024-03-03 PROCEDURE — 71045 X-RAY EXAM CHEST 1 VIEW: CPT | Mod: 26

## 2024-03-03 PROCEDURE — 84484 ASSAY OF TROPONIN QUANT: CPT

## 2024-03-03 PROCEDURE — 85379 FIBRIN DEGRADATION QUANT: CPT

## 2024-03-03 PROCEDURE — 85025 COMPLETE CBC W/AUTO DIFF WBC: CPT

## 2024-03-03 PROCEDURE — 83735 ASSAY OF MAGNESIUM: CPT

## 2024-03-03 PROCEDURE — 71275 CT ANGIOGRAPHY CHEST: CPT | Mod: 26,MC

## 2024-03-03 PROCEDURE — 76705 ECHO EXAM OF ABDOMEN: CPT

## 2024-03-03 PROCEDURE — 76705 ECHO EXAM OF ABDOMEN: CPT | Mod: 26

## 2024-03-03 RX ORDER — PANTOPRAZOLE SODIUM 20 MG/1
40 TABLET, DELAYED RELEASE ORAL ONCE
Refills: 0 | Status: COMPLETED | OUTPATIENT
Start: 2024-03-03 | End: 2024-03-03

## 2024-03-03 RX ORDER — PANTOPRAZOLE SODIUM 20 MG/1
1 TABLET, DELAYED RELEASE ORAL
Qty: 30 | Refills: 0
Start: 2024-03-03 | End: 2024-04-01

## 2024-03-03 RX ORDER — ONDANSETRON 8 MG/1
4 TABLET, FILM COATED ORAL ONCE
Refills: 0 | Status: COMPLETED | OUTPATIENT
Start: 2024-03-03 | End: 2024-03-03

## 2024-03-03 RX ORDER — ACETAMINOPHEN 500 MG
1000 TABLET ORAL ONCE
Refills: 0 | Status: COMPLETED | OUTPATIENT
Start: 2024-03-03 | End: 2024-03-03

## 2024-03-03 RX ADMIN — PANTOPRAZOLE SODIUM 40 MILLIGRAM(S): 20 TABLET, DELAYED RELEASE ORAL at 12:45

## 2024-03-03 RX ADMIN — ONDANSETRON 4 MILLIGRAM(S): 8 TABLET, FILM COATED ORAL at 12:46

## 2024-03-03 NOTE — ED PROVIDER NOTE - CLINICAL SUMMARY MEDICAL DECISION MAKING FREE TEXT BOX
57 yo F PMH noted above with epigastric pain and associated symptoms of nausea, sob, xiphoid tenderness.  Symptoms more likely GI in origin, but need to consider cardiac etiology as well.  Work up to include labs, sono of gallbladder, ekg, cxr.  Patient will receive medications to offer symptom relief.

## 2024-03-03 NOTE — ED ADULT TRIAGE NOTE - CHIEF COMPLAINT QUOTE
+ SOB with pain to mid-sternal, reports "lump" on sternum with tenderness to palp, nausea x 5 days  PMH lupus, fibromyalgia, sleep apnea

## 2024-03-03 NOTE — ED PROVIDER NOTE - NSFOLLOWUPINSTRUCTIONS_ED_ALL_ED_FT
YOU WERE EVALUATED FOR PAIN IN THE MID UPPER STOMACH AREA    YOUR TESTS DID NOT SHOW ANY PROBLEMS WITH THE HEART, LUNGS, KIDNEYS, LIVER OR GALLBLADDER.    YOU HAD LAB TESTS DONE, A SONOGRAM OF THE GALLBLADDER AND A CAT SCAN OF THE LUNGS AND THE BLOOD VESSELS IN THE LUNGS.  THERE WERE NO BLOOD CLOTS OR OTHER ISSUES WITH THE LUNGS.    THE PAIN MAY BE COMING FROM EXTRA ACID IN YOUR STOMACH    WE HAVE PRESCRIBED A MEDICINE FOR THIS FOR YOU TO TRY    FOR JOINT PAIN, YOU CAN TAKE TYLENOL ACCORDING TO THE BOTTLE INSTRUCTIONS    CALL YOUR DOCTOR'S OFFICE TOMORROW.  LET THEM KNOW THAT YOU WERE IN THE EMERGENCY ROOM AND THAT WE RECOMMEND THEY SEE YOU THIS WEEK FOR A FOLLOW UP APPOINTMENT    HOWEVER, IF YOU ARE NOT FEELING WELL, YOU SHOULD COME BACK TO THE EMERGENCY ROOM AND NOT WAIT FOR AN APPOINTMENT.    RETURN TO THE EMERGENCY ROOM IMMEDIATELY FOR:  FEVER  VOMITING  BLACK STOOL  BLOOD IN THE STOOL  TROUBLE BREATHING  CHEST PAIN  ANY NEW, WORSENING OR CONCERNING SYMPTOMS

## 2024-03-03 NOTE — ED PROVIDER NOTE - OBJECTIVE STATEMENT
57 yo F PMH lupus, RA, fibromyalgia, osteoporosis, sleep apnea, cervical spine surgery, vertigo with 5 days of epigastric pain, nausea, sob, pain to the area of the xiphoid process.  The epigastric pain radiates to the chest. No prior similar episodes.  She feels a lump in the mid chest-upon palpation this is her xiphoid process.  No redness or swelling to the skin.  No melena/brbpr, bloating, belching.

## 2024-03-03 NOTE — ED ADULT NURSE NOTE - OBJECTIVE STATEMENT
57 yo F PMH lupus, RA, fibromyalgia, osteoporosis, sleep apnea, cervical spine surgery, vertigo with 5 days of epigastric pain, nausea, sob, pain to the area of the xiphoid process.  The epigastric pain radiates to the chest. No prior similar episodes.  She feels a lump in the mid chest-upon palpation this is her xiphoid process.  No redness or swelling to the skin.  No melena/brbpr, bloating, belching. 59 y/o F c/o epigastric pain radiating to chest x5 days, intermittent nausea, SOB, and pain when pt palpates xiphoid process. Pt endorses feeling a lump in epigastric region,. No obvious deformities noted. PT A&Ox4, respirations even and unlabored, skin color WDL warm and dry, pt is ambulatory with a steady gait. No acute distress observed.

## 2024-03-03 NOTE — ED PROVIDER NOTE - PATIENT PORTAL LINK FT
You can access the FollowMyHealth Patient Portal offered by [facility] by registering at the following website: [email or web address]. By joining [employer]’s FollowMyHealth portal, you will also be able to view your health information using other applications (apps) compatible with our system.

## 2024-03-03 NOTE — ED PROVIDER NOTE - NSDCPRINTRESULTS_ED_ALL_ED
Patient requests all Lab, Cardiology, and Radiology Results on their Discharge Instructions DISCHARGE

## 2024-03-03 NOTE — ED PROVIDER NOTE - PHYSICAL EXAMINATION
General:  Well appearing, no distress  HEENT:  No conjunctival injection, neck supple, no congestion   Chest:  Tenderness to the xiphoid process. No sts, erythema.  Lungs:  Clear to auscultation bilaterally   Heart:  s1s2 normal, no murmur  Abdomen:  Patient will not lie flat due to her prior cervical spine surgery.  soft, epigastric tenderness, no guarding or rebound.  After palpation, patient reports nausea   :  Deferred  Rectal:  Deferred  Extremities: No edema, normal perfusion, no joint swelling or tenderness  Neuro:  Alert, conversant, motor/sensory grossly intact   Psychiatry:  Anxious, cooperative, no expression of suicidal or homicidal ideation

## 2024-03-03 NOTE — ED ADULT NURSE NOTE - CAS DISCH CONDITION
PT A&Ox4, respirations even and unlabored, skin color WDL warm and dry, pt is ambulatory with a steady gait. No acute distress observed. Pt denies SOB, dizziness at this time./Improved

## 2024-03-06 DIAGNOSIS — R11.0 NAUSEA: ICD-10-CM

## 2024-03-06 DIAGNOSIS — Z91.013 ALLERGY TO SEAFOOD: ICD-10-CM

## 2024-03-06 DIAGNOSIS — M32.9 SYSTEMIC LUPUS ERYTHEMATOSUS, UNSPECIFIED: ICD-10-CM

## 2024-03-06 DIAGNOSIS — R10.13 EPIGASTRIC PAIN: ICD-10-CM

## 2024-03-06 DIAGNOSIS — Z88.6 ALLERGY STATUS TO ANALGESIC AGENT: ICD-10-CM

## 2024-03-06 DIAGNOSIS — M81.0 AGE-RELATED OSTEOPOROSIS WITHOUT CURRENT PATHOLOGICAL FRACTURE: ICD-10-CM

## 2024-03-06 DIAGNOSIS — R07.9 CHEST PAIN, UNSPECIFIED: ICD-10-CM

## 2024-03-06 DIAGNOSIS — M06.9 RHEUMATOID ARTHRITIS, UNSPECIFIED: ICD-10-CM

## 2024-03-06 DIAGNOSIS — R06.02 SHORTNESS OF BREATH: ICD-10-CM

## 2024-03-06 DIAGNOSIS — Z88.0 ALLERGY STATUS TO PENICILLIN: ICD-10-CM

## 2024-03-06 DIAGNOSIS — M79.7 FIBROMYALGIA: ICD-10-CM

## 2024-04-02 ENCOUNTER — APPOINTMENT (OUTPATIENT)
Dept: PULMONOLOGY | Facility: CLINIC | Age: 59
End: 2024-04-02
Payer: MEDICARE

## 2024-04-02 VITALS
DIASTOLIC BLOOD PRESSURE: 68 MMHG | SYSTOLIC BLOOD PRESSURE: 102 MMHG | HEART RATE: 79 BPM | BODY MASS INDEX: 22.68 KG/M2 | HEIGHT: 63 IN | OXYGEN SATURATION: 98 % | TEMPERATURE: 98 F | WEIGHT: 128 LBS

## 2024-04-02 PROCEDURE — 94010 BREATHING CAPACITY TEST: CPT

## 2024-04-02 PROCEDURE — 94727 GAS DIL/WSHOT DETER LNG VOL: CPT

## 2024-04-02 PROCEDURE — 99214 OFFICE O/P EST MOD 30 MIN: CPT | Mod: 25

## 2024-04-02 PROCEDURE — 94729 DIFFUSING CAPACITY: CPT

## 2024-04-02 NOTE — CONSULT LETTER
[Dear  ___] : Dear  [unfilled], [Courtesy Letter:] : I had the pleasure of seeing your patient, [unfilled], in my office today. [Please see my note below.] : Please see my note below. [Consult Closing:] : Thank you very much for allowing me to participate in the care of this patient.  If you have any questions, please do not hesitate to contact me. [Sincerely,] : Sincerely, [FreeTextEntry3] : Esme Rodriguez MD, FCCP\par   [FreeTextEntry2] : Anton Kc MD\par  [address] \[place] [address]\par

## 2024-04-02 NOTE — ASSESSMENT
[FreeTextEntry1] : Data reviewed:  Echo 3/31/2023 [facility] reviewed on phone - normal ventricular function  PSG 5/2019: AHI 5.9 / titrated to 9 cm H2O / compliance report showed median pressure 6.3 HST 5/2021: AHI 16, no hypoxia  PA/lat CXR R 6/2020 : clear lungs CT chest [facility] 10/21/20 : [place] [facility] [place] : clear lungs CT shoulder [facility] 1/2/22 : calcified granuloma LLL CXR [facility] 12/2022 : clear lungs  PFT [facility] 10/9/20: FVC 1.95L (63%), FEV1 1.66L (67%), TLC 3.01L (66%), DLCO 13.2 (62%) (62in) PFT [facility] 1/14/21: FVC 1.77L (60%), FEV1 1.52L (64%), TLC 2.91L (66%), DLCO 13.2 (64%) (61in) PFT [facility] 4/13/21: FVC 1.93L (61%), FEV1 1.66L (66%), TLC 3.18L (67%), DLCO 12.76 (59%) (63in) PFT [facility] 8/12/21: FVC 1.95L (66%), FEV1 1.63L (70%), TLC 3.14L (71%), DLCO 14.83 (72%) (61in) PFT [facility] 4/12/22: FVC 1.99L (63%), FEV1 1.61L (64%), TLC 3.14L (66%), DLCO 13.02 (61%) (63in) PFT [facility] 4/12/23: FVC 2.01L (64%), FEV1 1.69L (68%), TLC 3.43L (72%), DLCO 12.7 (60%, adj Hgb 62%) (63in) PFT [facility] 4/2/24: FVC 2.11L (71%), FEV1 1.77L (75%), TLC 3.10L (67%), DLCO 10.86 (52%) (62in)  Impression: Dyspnea on exertion w SLE, restricted PFT, and normal lung parenchyma - stable Mod NEVIN on CPAP  Plan: Doing ok this year w stable dyspnea and tolerating CPAP. Return one year w PFT. Has had Covid and has had 2 vaccines, declines additional vaccines for no stated reason but somewhat agitated when I ask; don't need to keep discussing.

## 2024-04-02 NOTE — HISTORY OF PRESENT ILLNESS
[TextBox_4] : My patient since 2020 w dyspnea, restrictive physiology without parenchymal lung disease, carries dx of SLE, has mild NEVIN and insomnia, has significant depression relating to  dying in 2014 and only son dying in the  in 2018. PMD is Dr Kc, sees rheum, psychiatry. Had Covid late . On CPAP.  2023: Last seen by me one year ago and did also see Dr Belle in 2022 for clearance for shoulder surgery. In the interim had both shoulder surgery and bunion surgery. Has hypercalcemia due to hyperparathyroidism, and vitamin D deficiency. Struggling w her chronic pain. Using CPAP but since Nov she feels like it makes her saliva thick. Her CPAP is humidified. She stopped it 3 days ago because of this saliva. Having back pain. She is on HCQ. She does have reflux and takes famotidine. Has vertigo. Orthopnea.  2024: Needs L shoulder replacement per her rheumatologist (Dr Islas, [facility]); workers comp denying;  working on this. She does usually use her CPAP, is tolerating better. Sometimes gets some anxiety using mask, but overall sleeps better and feels better on it. Breathing ok, winded on stairs. Seeing [facility] for her abd pain - was seen in our ED for this. Father has  within past year after long illness w cancer.

## 2024-04-03 ENCOUNTER — APPOINTMENT (OUTPATIENT)
Dept: ORTHOPEDIC SURGERY | Facility: CLINIC | Age: 59
End: 2024-04-03
Payer: OTHER MISCELLANEOUS

## 2024-04-03 DIAGNOSIS — M54.12 RADICULOPATHY, CERVICAL REGION: ICD-10-CM

## 2024-04-03 PROCEDURE — 99214 OFFICE O/P EST MOD 30 MIN: CPT

## 2024-04-03 RX ORDER — TIZANIDINE 2 MG/1
2 TABLET ORAL
Qty: 80 | Refills: 1 | Status: ACTIVE | COMMUNITY
Start: 2024-04-03 | End: 1900-01-01

## 2024-04-03 NOTE — ASSESSMENT
[FreeTextEntry1] : 59 year old female followup s/p C4-C5 ACDF with iliac crest autograft (10/5/21) for cervical cord compression. She denies radicular pain, difficulty breathing, recent illness, fevers, saddle anesthesia, urinary retention or fecal incontinence.  She had her left shoulder surgery with Dr. Pacheco. She reports worsened left shoulder pain since the surgery. She sees a neurologist for headaches. Since her last visit her pain has continued. She says she needs a new shoulder surgeon for her shoulder replacement since Dr. Pacheco is leaving. She was given a referral to see Dr. Arellano for her shoulder.  She can resume PT for her neck. The patient was given a referral for physical therapy.  Continue Tizanidine prn.  F/U 3 months. We discussed red flag symptoms that would require emergent evaluation. She knows to call with any questions or concerns or if her symptoms acutely worsen.

## 2024-04-03 NOTE — PHYSICAL EXAM
[de-identified] : General: No acute distress, conversant, well-nourished. Head: Normocephalic, atraumatic Neck: trachea midline, FROM Heart: normotensive and normal rate and rhythm Lungs: No labored breathing Skin: No abrasions, no rashes, no edema Psych: Alert and oriented to person, place and time Extremities: no peripheral edema or digital cyanosis Gait: Uses cane. Vascular: warm and well perfused distally, palpable distal pulses  MSK: Cervical Spine:  Incision well healed [place] site incision c/d/i, staples removed and steri-strips applied  NEURO: Sensation   Left  C5 2/2  C6 2/2  C7 2/2  C8 2/2  T1 2/2    Right  C5 2/2  C6 2/2  C7 2/2  C8 2/2  T1 2/2   Motor:   Left  C5 (deltoid abduction) 5/5  C6 (biceps flexion) 5/5  C7 (triceps extension) 5/5  C8 (finger flexion) 5/5  T1 (interosseous) 5/5    Right  C5 (deltoid abduction) 5/5  C6 (biceps flexion) 5/5  C7 (triceps extension) 5/5  C8 (finger flexion) 5/5  T1 (interosseous) 5/5   Sensation  Left L2 - 2/2  Left L3 - 2/2 Left L4 - 2/2 Left L5 - 2/2 Left S1 - 2/2  Right L2 - 2/2  Right L3 - 2/2 Right L4 - 2/2 Right L5 - 2/2 Right S1 - 2/2  Motor:  Left L2 (hip flexion) 5/5  Left L3 (knee extension) 5/5  Left L4 (ankle dorsiflexion) 5/5  Left L5 (long toe extensor) 5/5  Left S1 (ankle plantar flexion) 5/5  Right L2 (hip flexion) 5/5  Right L3 (knee extension) 5/5  Right L4 (ankle dorsiflexion) 5/5  Right L5 (long toe extensor) 5/5  Right S1 (ankle plantar flexion) 5/5.  [de-identified] : Cervical MRI (2/1/23): 1. Status post ACDF at C4-C5, in anatomic alignment, with the canal and neural foramina well decompressed at the surgical level 2. At C3-C4, C5-C6 and C6-C7 there is marked disc space narrowing with posterior disc-osteophyte complexes deforming the thecal sac associated with left foraminal narrowing at C5-C6 and C6-C7 3. Reversal of the upper cervical lordosis

## 2024-04-03 NOTE — HISTORY OF PRESENT ILLNESS
[de-identified] : 59 year old female followup s/p C4-C5 ACDF with iliac crest autograft (10/5/21) for cervical cord compression. She denies radicular pain, difficulty breathing, recent illness, fevers, saddle anesthesia, urinary retention or fecal incontinence.  She had her left shoulder surgery with Dr. Pacheco. She reports worsened left shoulder pain since the surgery. She sees a neurologist for headaches. Since her last visit her pain has continued. She says she needs a new shoulder surgeon for her shoulder replacement since Dr. Pacheco is leaving.

## 2024-04-04 ENCOUNTER — NON-APPOINTMENT (OUTPATIENT)
Age: 59
End: 2024-04-04

## 2024-04-04 ENCOUNTER — APPOINTMENT (OUTPATIENT)
Dept: OPHTHALMOLOGY | Facility: CLINIC | Age: 59
End: 2024-04-04
Payer: MEDICARE

## 2024-04-04 PROCEDURE — 92083 EXTENDED VISUAL FIELD XM: CPT

## 2024-04-04 PROCEDURE — 92014 COMPRE OPH EXAM EST PT 1/>: CPT | Mod: 25

## 2024-04-04 PROCEDURE — 92133 CPTRZD OPH DX IMG PST SGM ON: CPT

## 2024-04-11 ENCOUNTER — APPOINTMENT (OUTPATIENT)
Dept: NEUROLOGY | Facility: CLINIC | Age: 59
End: 2024-04-11

## 2024-04-19 RX ORDER — RIMEGEPANT SULFATE 75 MG/75MG
75 TABLET, ORALLY DISINTEGRATING ORAL DAILY
Qty: 8 | Refills: 3 | Status: ACTIVE | COMMUNITY
Start: 2024-04-17 | End: 1900-01-01

## 2024-04-25 ENCOUNTER — APPOINTMENT (OUTPATIENT)
Dept: ORTHOPEDIC SURGERY | Facility: CLINIC | Age: 59
End: 2024-04-25
Payer: OTHER MISCELLANEOUS

## 2024-04-25 PROCEDURE — 73030 X-RAY EXAM OF SHOULDER: CPT | Mod: LT

## 2024-04-25 PROCEDURE — 99204 OFFICE O/P NEW MOD 45 MIN: CPT

## 2024-04-25 NOTE — PHYSICAL EXAM
[de-identified] : PHYSICAL EXAM  RIGHT  SHOULDER  TENDER LEVATOR  MODERATE  SCAPULAR PROTRACTION AROM 80  / 80 / 80 / 0 TENDER: GH JOINT   SPECIAL TESTING : ZUNIGA - POSITIVE  RIVAS - POSITIVE  SPEED TEST - POSITIVE  RADER - NEGATIVE  APPREHENSION AND SUPPRESSION - NEGATIVE   RC STRENGTH TESTING  SS:  5/5 SUB 5/5 IS     5/5 BICEPS  5/5  SENSATION  - GROSSLY INTACT   [de-identified] : LEFT SHOULDER XRAY (2 VIEWS - AP AND OUTLET) -   NO OBVIOUS FRACTURE , SEPARATION OR DISLOCATION  GRADE 2  OSTEOARTHRITIS,  TYPE 1B ACROMION SP ACROMIOPLASTY CSA= 34.5

## 2024-04-25 NOTE — DISCUSSION/SUMMARY
[de-identified] : PATIENT HAS COMPLICATED HISTORY OF LEFT SHOULDER PAIN AND  LIMITED USE STATUS POST WORK-RELATED INJURY SEPTEMBER 26, 2021.  HISTORY OF INJURY NOTED IN HPI  PATIENT HAD ARTHROSCOPY LEFT SHOULDER MARCH 25, 2022 REPORTED NO SIGNIFICANT RELIEF FROM THIS PROCEDURE  MULTIPLE INJECTIONS AND PT HAVE NOT PROVIDED RELIEF   EXAM TODAY REVEALS VERY LIMITED AROM WITH PAIN IN SHOULDER RADIATING TO BASE OF NECK AND DOWN ARM   PATIENT PRESENTS TODAY WITH MRI REVEALING PARTIAL ROTATOR CUFF TEAR AND GLENOHUMERAL OSTEOARTHRITIS  X-RAYS TODAY REVEAL AT LEAST GRADE 2 OSTEOARTHRITIS IN THE GLENOHUMERAL JOINT  PATIENT WAS AUTHORIZED FOR SHOULDER REPLACEMENT WITH ANOTHER DOCTOR PRESENTS TODAY TO CONSIDER SURGERY WITH ME  I HAVE ORDERED CT SCAN LEFT SHOULDER WITH SPECIFICATIONS SENT FOR 3D RECONSTRUCTIONS AND PREOPERATIVE PLANNING  PATIENT WILL RETURN AFTER CT SCAN IS DONE, TO DISCUSS TREATMENT OPTIONS AND SCHEDULING

## 2024-04-25 NOTE — HISTORY OF PRESENT ILLNESS
[FreeTextEntry1] : WORKERS COMPENSATION  LOCATION:LEFT SHOULDER INJURY -RHD  DATE OF INJURY: SEPTEMBER 26, 2021- WAS LIFTING A PATIENT UP AT WORK AND HEARD A CRACKING NOISE RADIATING PAIN UP NECK AND DOWN  CONSTANT PAIN 9/10  -  INTERMITTENT 9/10 WITH USE  PAIN LEVEL:9/10  TREATMENTS: PT HAD MORE THAN 5 INJECTIONS IN THE LEFT SHOULDER- LAST INJ WAS JANUARY 2024 (FROM PAIN MANAGEMENT DOCTOR) AGGRAVATING FACTORS: OVER HEAD LIFTING, REACHING BEHIND, AT NIGHT SLEEPING  ASSOCIATED SYMPTOMS: NUMBNESS IN LEFT FINGERS  ASSOCIATED CLICKING/LOCKING/POPPING/GRINDING PATIENT HAD LEFT SHURGERY IN THE PAST 11/21/2022 WORKERS COMP APPROVED SURGERY FOR SHOULDER REPLACEMENT PRIOR STUDIES: MRI FROM [place] 10/04/2023  PREVIOUS ARTHROSCOPY  3/25/2022 - LEFT SHOULDER ARTHROSCOPY DEBRIDMENT AND CHONDROPLASTY  GH JOINT, BRITANY + DISTAL CLAVICLE RESECTION , BICEPS TENOTOMY, TED AND PRP INJECTION  - NO RELIEF

## 2024-05-30 ENCOUNTER — APPOINTMENT (OUTPATIENT)
Dept: ORTHOPEDIC SURGERY | Facility: CLINIC | Age: 59
End: 2024-05-30
Payer: OTHER MISCELLANEOUS

## 2024-05-30 DIAGNOSIS — M75.112 INCOMPLETE ROTATOR CUFF TEAR OR RUPTURE OF LEFT SHOULDER, NOT SPECIFIED AS TRAUMATIC: ICD-10-CM

## 2024-05-30 DIAGNOSIS — M19.012 PRIMARY OSTEOARTHRITIS, LEFT SHOULDER: ICD-10-CM

## 2024-05-30 PROCEDURE — 99213 OFFICE O/P EST LOW 20 MIN: CPT

## 2024-05-31 NOTE — ASSESSMENT
[Physical Disability Temporary Total] : temporarily total disabled [Can the patient return to usual work activities as indicated? If yes, indicate date___] : The patient cannot return to usual work activities as indicated. [FreeTextEntry7] : PLANNING FOR ARTHROPLASTY END OF SUMMER / FALL

## 2024-05-31 NOTE — DISCUSSION/SUMMARY
[de-identified] : PATIENT HAS COMPLICATED HISTORY OF LEFT SHOULDER PAIN AND  LIMITED USE STATUS POST WORK-RELATED INJURY SEPTEMBER 26, 2021.  HISTORY OF INJURY NOTED IN HPI  PATIENT HAD ARTHROSCOPY LEFT SHOULDER MARCH 25, 2022 REPORTED NO SIGNIFICANT RELIEF FROM THIS PROCEDURE  CT SCAN PERFORMED FOR PREOPERATIVE PLANNING  I HAVE OFFERED PATIENT SHOULDER ARTHROPLASTY AS WE HAVE DISCUSSED ON PREVIOUS VISIT BUT SHE IS NOT READY TO SCHEDULE YET  CURRENTLY THINKS POTENTIALLY SHE WILL CONSIDER SCHEDULING FOR OCTOBER  I HAVE RECOMMENDED AND INSTRUCTED PATIENT TO RETURN 6 WEEKS PRIOR TO DESIRED SURGERY DATE SO WE CAN OBTAIN NEW MRI, AND START PRESURGICAL PLANNING AUTHORIZATION AND SCHEDULING  SHOULD SHE DECIDE TO PROCEED SOONER SHE SHOULD RETURN 6 WEEKS PRIOR TO DESIRED SURGERY DATE

## 2024-05-31 NOTE — HISTORY OF PRESENT ILLNESS
[FreeTextEntry1] : WORKERS COMPENSATION  LOCATION:LEFT SHOULDER INJURY -RHD  DATE OF INJURY: SEPTEMBER 26, 2021- WAS LIFTING A PATIENT UP AT WORK AND HEARD A CRACKING NOISE RADIATING PAIN UP NECK AND DOWN  CT RESULTS TODAY     CONSTANT PAIN 9/10  -  INTERMITTENT 9/10 WITH USE  PAIN LEVEL:9/10  TREATMENTS: PT HAD MORE THAN 5 INJECTIONS IN THE LEFT SHOULDER- LAST INJ WAS JANUARY 2024 (FROM PAIN MANAGEMENT DOCTOR) AGGRAVATING FACTORS: OVER HEAD LIFTING, REACHING BEHIND, AT NIGHT SLEEPING  ASSOCIATED SYMPTOMS: NUMBNESS IN LEFT FINGERS  ASSOCIATED CLICKING/LOCKING/POPPING/GRINDING PATIENT HAD LEFT SHURGERY IN THE PAST 11/21/2022 WORKERS COMP APPROVED SURGERY FOR SHOULDER REPLACEMENT PRIOR STUDIES: MRI FROM [place] 10/04/2023  PREVIOUS ARTHROSCOPY  3/25/2022 - LEFT SHOULDER ARTHROSCOPY DEBRIDMENT AND CHONDROPLASTY  GH JOINT, BRITANY + DISTAL CLAVICLE RESECTION , BICEPS TENOTOMY, TED AND PRP INJECTION  - NO RELIEF

## 2024-05-31 NOTE — PHYSICAL EXAM
[de-identified] : PHYSICAL EXAM LEFT  SHOULDER  NECK EXAM  FROM NONTENDER  SPURLING  RIGHT=NEG, LEFT=NEG  NORMAL POSTURE / SCAPULAR PROTRACTION AROM 65 / 65   TENDER: SA REGION / AC JOINT / GH JOINT / BICEPS GROOVE  SPECIAL TESTING : ZUNIGA - POSITIVE  RIVAS - POSITIVE  SPEED TEST - POSITIVE  RADER - NEGATIVE  APPREHENSION AND SUPPRESSION - NEGATIVE   RC STRENGTH TESTING  SS:  5/5 SUB 5/5 IS     5/5 BICEPS  5/5  SENSATION  - GROSSLY INTACT   [de-identified] : Date of Exam: 05-   EXAM:  CT LEFT SHOULDER WITHOUT CONTRAST    IMPRESSION:  1. Mild widening of the acromioclavicular joint, with mild AC joint arthrosis (?old trauma or surgery). 2. Laterally downsloping acromion, without significant subacromial enthesophyte formation on the acromial rim. 3. Mild to moderate glenohumeral arthrosis.

## 2024-06-04 ENCOUNTER — APPOINTMENT (OUTPATIENT)
Dept: NEUROLOGY | Facility: CLINIC | Age: 59
End: 2024-06-04
Payer: MEDICARE

## 2024-06-04 VITALS
BODY MASS INDEX: 22.5 KG/M2 | TEMPERATURE: 97.3 F | HEIGHT: 63 IN | DIASTOLIC BLOOD PRESSURE: 85 MMHG | WEIGHT: 127 LBS | OXYGEN SATURATION: 100 % | SYSTOLIC BLOOD PRESSURE: 124 MMHG | HEART RATE: 67 BPM

## 2024-06-04 DIAGNOSIS — M47.12 OTHER SPONDYLOSIS WITH MYELOPATHY, CERVICAL REGION: ICD-10-CM

## 2024-06-04 DIAGNOSIS — R52 PAIN, UNSPECIFIED: ICD-10-CM

## 2024-06-04 DIAGNOSIS — G43.009 MIGRAINE W/OUT AURA, NOT INTRACTABLE, W/OUT STATUS MIGRAINOSUS: ICD-10-CM

## 2024-06-04 PROCEDURE — 99214 OFFICE O/P EST MOD 30 MIN: CPT

## 2024-06-04 PROCEDURE — G2211 COMPLEX E/M VISIT ADD ON: CPT

## 2024-06-04 RX ORDER — AMITRIPTYLINE HYDROCHLORIDE 50 MG/1
50 TABLET, FILM COATED ORAL
Qty: 30 | Refills: 4 | Status: ACTIVE | COMMUNITY
Start: 2022-01-05 | End: 1900-01-01

## 2024-06-04 RX ORDER — MECLIZINE HYDROCHLORIDE 12.5 MG/1
12.5 TABLET ORAL 3 TIMES DAILY
Qty: 90 | Refills: 3 | Status: ACTIVE | COMMUNITY
Start: 2022-02-03 | End: 1900-01-01

## 2024-06-04 RX ORDER — ONDANSETRON 8 MG/1
8 TABLET ORAL
Qty: 12 | Refills: 0 | Status: DISCONTINUED | COMMUNITY
Start: 2023-03-16 | End: 2024-06-04

## 2024-06-04 NOTE — HISTORY OF PRESENT ILLNESS
[FreeTextEntry1] : Follow up  PMHx Pt was referred for evaluation of headaches. She was previously seen by Dr. Mead and Dr Amaya  From Dr. Mead's notes: 56 yo W HA. hx of  Her main complaint is pain throughout her body, although she also endorses weakness and numbness She reports history of pituitary tumor, fibromyalgia, carpal tunnel syndrome, migraine headaches.  She is on cymbalta, prednisone, sertraline She has seen a neuro-ophthalmologist for visual problems, possibly related to plaquenil, but also states she cannot find why her vision is getting worse.  ESR 6, , RF / CCP / SSA/B negative, B12/folate normal MRI brain 2017 unremarkable   Pt states that headaches started more than 7 years ago. HAs are about 4 times a week. HAs described as sharp, frontal, pressure like (like "little needles"), at times associated with sensitivity to light and noise. Pt failed Topamax and Magnesium. Pt states that she needed to reduce the hours of work due to headaches. She states that she can not tolerate NSAIDs. She states that she is not taking any meds for acute headaches but tries to lie down and use cold compresses. She reports Lactorrhea; the results of recent MRI are not available. 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    Meds for Migraine TOP Celebrex Neurontin Imitrex  Never took:  Nortriptyline Propranolol Acido Valproico    HPI   10/4/2022  Amitriptyline was increased to 50 mg in 2022 and it did help a bit. w HA Vertigo in AM or almost qam. The room spins around aw nausea.   Not sleeping well neck pain not better,  Doing PT for neck Ophthal shows binasal visual defect by Dr. Earl   HPI    2024 Stress Father  a few months ago Vertigo same Nurtec works great but insurance doesnt pay for it.   HPI    2024 Stress Vertigo same Nurtec 75 works great but insurance doesnt pay for it.  Insurance doest pay No other medical issues,  Lupus under control

## 2024-07-23 ENCOUNTER — APPOINTMENT (OUTPATIENT)
Dept: ORTHOPEDIC SURGERY | Facility: CLINIC | Age: 59
End: 2024-07-23
Payer: OTHER MISCELLANEOUS

## 2024-07-23 DIAGNOSIS — M54.2 CERVICALGIA: ICD-10-CM

## 2024-07-23 PROCEDURE — 72050 X-RAY EXAM NECK SPINE 4/5VWS: CPT

## 2024-07-23 PROCEDURE — 99214 OFFICE O/P EST MOD 30 MIN: CPT

## 2024-07-23 NOTE — ASSESSMENT
[FreeTextEntry1] : 59 year old female followup s/p C4-C5 ACDF with iliac crest autograft (10/5/21) for cervical cord compression. She denies radicular pain, difficulty breathing, recent illness, fevers, saddle anesthesia, urinary retention or fecal incontinence. She had her left shoulder surgery with Dr. Pacheco. She reports worsened left shoulder pain since the surgery. She sees a neurologist for headaches. Since her last visit, she saw Dr. Arellano and was recommended a left shoulder surgery. She is planning to schedule in a couple of months. She reports continued pain with ROM. The patient was given a referral for physical therapy.  Tizanidine prn. Given referral to hand specialist for left hand base of thumb pain (CMC).  F/U in 3 months. We discussed red flag symptoms that would require emergent evaluation. She knows to call with any questions or concerns or if her symptoms acutely worsen.

## 2024-07-23 NOTE — PHYSICAL EXAM
[de-identified] : General: No acute distress, conversant, well-nourished. Head: Normocephalic, atraumatic Neck: trachea midline, FROM Heart: normotensive and normal rate and rhythm Lungs: No labored breathing Skin: No abrasions, no rashes, no edema Psych: Alert and oriented to person, place and time Extremities: no peripheral edema or digital cyanosis Gait: Uses cane. Vascular: warm and well perfused distally, palpable distal pulses  MSK: Cervical Spine:  Incision well healed [place] site incision c/d/i, staples removed and steri-strips applied  NEURO: Sensation   Left  C5 2/2  C6 2/2  C7 2/2  C8 2/2  T1 2/2    Right  C5 2/2  C6 2/2  C7 2/2  C8 2/2  T1 2/2   Motor:   Left  C5 (deltoid abduction) 5/5  C6 (biceps flexion) 5/5  C7 (triceps extension) 5/5  C8 (finger flexion) 5/5  T1 (interosseous) 5/5    Right  C5 (deltoid abduction) 5/5  C6 (biceps flexion) 5/5  C7 (triceps extension) 5/5  C8 (finger flexion) 5/5  T1 (interosseous) 5/5   Sensation  Left L2 - 2/2  Left L3 - 2/2 Left L4 - 2/2 Left L5 - 2/2 Left S1 - 2/2  Right L2 - 2/2  Right L3 - 2/2 Right L4 - 2/2 Right L5 - 2/2 Right S1 - 2/2  Motor:  Left L2 (hip flexion) 5/5  Left L3 (knee extension) 5/5  Left L4 (ankle dorsiflexion) 5/5  Left L5 (long toe extensor) 5/5  Left S1 (ankle plantar flexion) 5/5  Right L2 (hip flexion) 5/5  Right L3 (knee extension) 5/5  Right L4 (ankle dorsiflexion) 5/5  Right L5 (long toe extensor) 5/5  Right S1 (ankle plantar flexion) 5/5.  [de-identified] : I ordered radiographs to evaluate the patient's symptoms. Cervical 4 view radiographs taken in the office today show no dislocation or fracture. Cervical spondylosis.  s/p ACDF C4-C5 with hardware in appropriate position and alignment.  No instability on dynamic series.  Cervical MRI (2/1/23): 1. Status post ACDF at C4-C5, in anatomic alignment, with the canal and neural foramina well decompressed at the surgical level 2. At C3-C4, C5-C6 and C6-C7 there is marked disc space narrowing with posterior disc-osteophyte complexes deforming the thecal sac associated with left foraminal narrowing at C5-C6 and C6-C7 3. Reversal of the upper cervical lordosis

## 2024-07-23 NOTE — HISTORY OF PRESENT ILLNESS
[de-identified] : 59 year old female followup s/p C4-C5 ACDF with iliac crest autograft (10/5/21) for cervical cord compression. She denies radicular pain, difficulty breathing, recent illness, fevers, saddle anesthesia, urinary retention or fecal incontinence. She had her left shoulder surgery with Dr. Pacheco. She reports worsened left shoulder pain since the surgery. She sees a neurologist for headaches. Since her last visit, she saw Dr. Arellano and was recommended a left shoulder surgery. She is planning to schedule in a couple of months. She reports continued pain with ROM.

## 2024-08-20 NOTE — ED PROVIDER NOTE - ATTESTATION, MLM
Problem: Discharge Planning  Goal: Discharge to home or other facility with appropriate resources  Outcome: Progressing  Flowsheets (Taken 8/20/2024 1238)  Discharge to home or other facility with appropriate resources: Identify barriers to discharge with patient and caregiver     Problem: Safety - Adult  Goal: Free from fall injury  Outcome: Progressing  Flowsheets (Taken 8/20/2024 1238)  Free From Fall Injury: Instruct family/caregiver on patient safety  Note: Patient in lowest position, bed breaks locked, bed alarm in place, call light within reach, and encouraged to call for assistance.      Problem: Pain  Goal: Verbalizes/displays adequate comfort level or baseline comfort level  Outcome: Progressing  Flowsheets (Taken 8/20/2024 1238)  Verbalizes/displays adequate comfort level or baseline comfort level:   Encourage patient to monitor pain and request assistance   Assess pain using appropriate pain scale     Problem: ABCDS Injury Assessment  Goal: Absence of physical injury  Outcome: Progressing  Flowsheets (Taken 8/20/2024 1238)  Absence of Physical Injury: Implement safety measures based on patient assessment     Problem: Genitourinary - Adult  Goal: Urinary catheter remains patent  Outcome: Progressing  Flowsheets (Taken 8/20/2024 1238)  Urinary catheter remains patent:   Assess patency of urinary catheter   Assess need for a larger catheter size or a 3-way catheter for continuous bladder irrigation  Note: Patient has 3 way martin catheter for continuous bladder irrigation. Plan for CYSTOSCOPY EVACUATION OF CLOTS later today     Problem: Skin/Tissue Integrity  Goal: Absence of new skin breakdown  Description: 1.  Monitor for areas of redness and/or skin breakdown  2.  Assess vascular access sites hourly  3.  Every 4-6 hours minimum:  Change oxygen saturation probe site  4.  Every 4-6 hours:  If on nasal continuous positive airway pressure, respiratory therapy assess nares and determine need for appliance  change or resting period.  Outcome: Progressing  Note: Patient turns self. Patient encouraged to reposition appropriately.       I have reviewed and confirmed nurses' notes for patient's medications, allergies, medical history, and surgical history.

## 2024-08-28 ENCOUNTER — APPOINTMENT (OUTPATIENT)
Dept: ORTHOPEDIC SURGERY | Facility: CLINIC | Age: 59
End: 2024-08-28

## 2024-08-28 DIAGNOSIS — M19.049 PRIMARY OSTEOARTHRITIS, UNSPECIFIED HAND: ICD-10-CM

## 2024-08-28 PROCEDURE — 20550 NJX 1 TENDON SHEATH/LIGAMENT: CPT | Mod: 59,RT

## 2024-08-28 PROCEDURE — 99204 OFFICE O/P NEW MOD 45 MIN: CPT | Mod: 25

## 2024-08-28 PROCEDURE — 20605 DRAIN/INJ JOINT/BURSA W/O US: CPT | Mod: LT

## 2024-08-28 PROCEDURE — 73130 X-RAY EXAM OF HAND: CPT | Mod: LT,RT

## 2024-08-28 NOTE — PHYSICAL EXAM
[de-identified] : Physical exam demonstrates the patient to be alert and oriented x 3 and capable of ambulation. The patient is well-developed and well-nourished in no apparent respiratory distress. The majority of the skin is intact bilaterally in the upper extremities without any bilateral elbow lymphadenopathy.  Evaluation of both elbows reveals full symmetric range of motion from full extension to 140 of flexion with full pronation and full supination.   The wrists have symmetric range of motion bilaterally. There is tenderness to palpation over the right and left thumb CMC joint, more symptomatic on the left side. There is no tenderness over the scaphoid, scapholunate, or lunotriquetral ligaments bilaterally. There is a negative Payan's test bilaterally. There is no tenderness over the distal radial ulnar joint or TFCC and no evidence of instability bilaterally. There is no tenderness over the pisotriquetral joint, hamate hook, or CMC joints bilaterally. The patient is nontender over both scaphoids and anatomic snuffbox is bilaterally. There is no clubbing cyanosis or edema.  There is diminished active and passive range of motion secondary to discomfort. Tender to palpation over the right long finger and more mild tenderness over the left index finger A1 pulley, locking can be appreciated.  Nontender over the MCP joint/collateral ligament.  No collateral ligament instability or extensor tendon subluxation.  Flexor and extensor tendons intact.   There is good capillary refill of the digits bilaterally.  Sensation is intact to light touch bilaterally. [de-identified] : PA, lateral and oblique X-Rays of bilateral wrists were obtained to assess for bony injury, masses or lesions. There is presence of bilateral thumb CMC arthritis in addition to mild joint space narrowing in all digits, bilaterally.

## 2024-08-28 NOTE — PROCEDURE
[FreeTextEntry1] : My impression is that the patient has basal joint osteoarthritis. We discussed treatment options and she elected to undergo a left basal joint injection. The risks, benefits, and alternatives were discussed with the patient. This included, but was not limited to nerve injury, infection, subcutaneous atrophy, skin depigmentation etc. Under informed consent and sterile conditions the basal joint was injected with a combination of 1/2 cc Kenalog-10 and 1/2 cc of 2% plain lidocaine. It is my hopes that this significantly alleviates the patients symptoms.  My impression is that the patient has stenosing tenosynovitis of the right long finger. We discussed treatment options and she elected to undergo a trigger finger injection. The risks, benefits, and alternatives were discussed with the patient. This included, but was not limited to nerve injury, infection, subcutaneous atrophy, skin depigmentation etc. Under informed consent and sterile conditions the flexor tendon sheath at the A1 pulley was injected with a combination of 1/2 cc Kenalog-10 and 1/2 cc of 2% plain lidocaine. It is my hopes that this significantly alleviates the patients symptoms.

## 2024-08-28 NOTE — ASSESSMENT
[FreeTextEntry1] : My impression is that the patient has a right long finger trigger finger. Initial treatment options were discussed shared decision-making was made to proceed with a corticosteroid injection into the flexor tendon sheath today. I explained that recurrence of symptoms is not uncommon. If this were to occur, consideration for another injection will be made. I did note that multiple, repeated injections become less efficacious over time and eventually, surgery should be considered. My impression is that the patient has also has bilateral, more symptomatic left thumb basal joint arthritis. Initial treatment options were discussed. I explained that the condition is not curative and that initial treatment his pain to help control her symptoms. I discussed activity modification, diminishing activities that exacerbate her symptoms (i.e. opening tight jars and bottle caps), anti-inflammatory medication/ointment, thumb spica splint wear and corticosteroid injection. The patient elected to undergo a steroid shot today and tolerated it well. I explained it may take up to one to 2 weeks for the steroid to take effect. I did note that recurrence of symptoms is not uncommon. If there is persistence of significant symptoms over a period of at least 6 months, despite conservative treatment, consideration for surgical intervention will be made. All questions were answered and she will follow up with me on an as-needed basis.

## 2024-08-28 NOTE — PHYSICAL EXAM
[de-identified] : Physical exam demonstrates the patient to be alert and oriented x 3 and capable of ambulation. The patient is well-developed and well-nourished in no apparent respiratory distress. The majority of the skin is intact bilaterally in the upper extremities without any bilateral elbow lymphadenopathy.  Evaluation of both elbows reveals full symmetric range of motion from full extension to 140 of flexion with full pronation and full supination.   The wrists have symmetric range of motion bilaterally. There is tenderness to palpation over the right and left thumb CMC joint, more symptomatic on the left side. There is no tenderness over the scaphoid, scapholunate, or lunotriquetral ligaments bilaterally. There is a negative Payan's test bilaterally. There is no tenderness over the distal radial ulnar joint or TFCC and no evidence of instability bilaterally. There is no tenderness over the pisotriquetral joint, hamate hook, or CMC joints bilaterally. The patient is nontender over both scaphoids and anatomic snuffbox is bilaterally. There is no clubbing cyanosis or edema.  There is diminished active and passive range of motion secondary to discomfort. Tender to palpation over the right long finger and more mild tenderness over the left index finger A1 pulley, locking can be appreciated.  Nontender over the MCP joint/collateral ligament.  No collateral ligament instability or extensor tendon subluxation.  Flexor and extensor tendons intact.   There is good capillary refill of the digits bilaterally.  Sensation is intact to light touch bilaterally. [de-identified] : PA, lateral and oblique X-Rays of bilateral wrists were obtained to assess for bony injury, masses or lesions. There is presence of bilateral thumb CMC arthritis in addition to mild joint space narrowing in all digits, bilaterally.

## 2024-08-28 NOTE — HISTORY OF PRESENT ILLNESS
[FreeTextEntry1] : 59-year-old female with past medical history of lupus, hyperparathyroidism, osteopenia, rheumatoid arthritis (on Plaquenil) presents for evaluation of bilateral hand stiffness and discomfort which started approximately 3 to 4 months ago.  The patient notes that she is unable to make a full fist due to her symptoms.  She notes that her symptoms are more prominent in the morning.

## 2024-09-09 ENCOUNTER — APPOINTMENT (OUTPATIENT)
Dept: ORTHOPEDIC SURGERY | Facility: CLINIC | Age: 59
End: 2024-09-09
Payer: MEDICARE

## 2024-09-09 DIAGNOSIS — M75.112 INCOMPLETE ROTATOR CUFF TEAR OR RUPTURE OF LEFT SHOULDER, NOT SPECIFIED AS TRAUMATIC: ICD-10-CM

## 2024-09-09 DIAGNOSIS — M19.012 PRIMARY OSTEOARTHRITIS, LEFT SHOULDER: ICD-10-CM

## 2024-09-09 PROCEDURE — 99213 OFFICE O/P EST LOW 20 MIN: CPT

## 2024-09-09 NOTE — DISCUSSION/SUMMARY
[de-identified] : PATIENT HAS COMPLICATED HISTORY OF LEFT SHOULDER PAIN AND LIMITED USE STATUS POST WORK-RELATED INJURY SEPTEMBER 26, 2021. HISTORY OF INJURY NOTED IN HPI  PATIENT HAD ARTHROSCOPY LEFT SHOULDER MARCH 25, 2022 REPORTED NO SIGNIFICANT RELIEF FROM THIS PROCEDURE  CT SCAN PERFORMED - ONLY GRADE 2 OSTEOARTHRITIS   I HAVE OFFERED PATIENT SHOULDER RC REPAIR VS  ARTHROPLASTY AS WE HAVE DISCUSSED ON PREVIOUS VISIT BUT SHE IS NOT READY TO SCHEDULE YET  CURRENTLY THINKS POTENTIALLY SHE WILL CONSIDER SCHEDULING FOR OCTOBER  I HAVE RECOMMENDED AND INSTRUCTED PATIENT TO RETURN 6 WEEKS PRIOR TO DESIRED SURGERY DATE SO WE CAN OBTAIN NEW MRI, AND START PRESURGICAL PLANNING AUTHORIZATION AND SCHEDULING  SHOULD SHE DECIDE TO PROCEED SOONER SHE SHOULD RETURN 6 WEEKS PRIOR TO DESIRED SURGERY DATE.

## 2024-09-09 NOTE — HISTORY OF PRESENT ILLNESS
[FreeTextEntry1] : WORKERS COMPENSATION  LOCATION:LEFT SHOULDER INJURY -RHD  DATE OF INJURY: SEPTEMBER 26, 2021- WAS LIFTING A PATIENT UP AT WORK AND HEARD A CRACKING NOISE RADIATING PAIN UP NECK AND DOWN  PT IS HERE TO SCHEDULE SURGERY FOR OCTOBER     CONSTANT PAIN 9/10  -  INTERMITTENT 9/10 WITH USE  PAIN LEVEL:9/10  TREATMENTS: PT HAD MORE THAN 5 INJECTIONS IN THE LEFT SHOULDER- LAST INJ WAS JANUARY 2024 (FROM PAIN MANAGEMENT DOCTOR) AGGRAVATING FACTORS: OVER HEAD LIFTING, REACHING BEHIND, AT NIGHT SLEEPING  ASSOCIATED SYMPTOMS: NUMBNESS IN LEFT FINGERS  ASSOCIATED CLICKING/LOCKING/POPPING/GRINDING PATIENT HAD LEFT SHURGERY IN THE PAST 11/21/2022 WORKERS COMP APPROVED SURGERY FOR SHOULDER REPLACEMENT PRIOR STUDIES: MRI FROM [place] 10/04/2023  PREVIOUS ARTHROSCOPY  3/25/2022 - LEFT SHOULDER ARTHROSCOPY DEBRIDMENT AND CHONDROPLASTY  GH JOINT, BRITANY + DISTAL CLAVICLE RESECTION , BICEPS TENOTOMY, TED AND PRP INJECTION  - NO RELIEF

## 2024-09-09 NOTE — PHYSICAL EXAM
[de-identified] : PHYSICAL EXAM LEFT  SHOULDER  NECK EXAM  FROM NONTENDER  SPURLING  RIGHT=NEG, LEFT=NEG  NORMAL POSTURE / SCAPULAR PROTRACTION AROM 100 / 100 / 60 / 0   TENDER: SA REGION AND   GH JOINT  SPECIAL TESTING : ZUNIGA - POSITIVE  RIVAS - POSITIVE  SPEED TEST - POSITIVE  RADER - NEGATIVE  APPREHENSION AND SUPPRESSION - NEGATIVE   RC STRENGTH TESTING  SS:  5/5 SUB 5/5 IS     5/5 BICEPS  5/5  SENSATION  - GROSSLY INTACT

## 2024-09-11 ENCOUNTER — APPOINTMENT (OUTPATIENT)
Dept: ENDOCRINOLOGY | Facility: CLINIC | Age: 59
End: 2024-09-11
Payer: MEDICARE

## 2024-09-11 VITALS
OXYGEN SATURATION: 96 % | DIASTOLIC BLOOD PRESSURE: 82 MMHG | TEMPERATURE: 97.1 F | HEART RATE: 70 BPM | WEIGHT: 127 LBS | HEIGHT: 63 IN | SYSTOLIC BLOOD PRESSURE: 122 MMHG | BODY MASS INDEX: 22.5 KG/M2

## 2024-09-11 DIAGNOSIS — E23.6 OTHER DISORDERS OF PITUITARY GLAND: ICD-10-CM

## 2024-09-11 DIAGNOSIS — R79.89 OTHER SPECIFIED ABNORMAL FINDINGS OF BLOOD CHEMISTRY: ICD-10-CM

## 2024-09-11 DIAGNOSIS — M81.0 AGE-RELATED OSTEOPOROSIS W/OUT CURRENT PATHOLOGICAL FRACTURE: ICD-10-CM

## 2024-09-11 PROCEDURE — G2211 COMPLEX E/M VISIT ADD ON: CPT

## 2024-09-11 PROCEDURE — 99204 OFFICE O/P NEW MOD 45 MIN: CPT

## 2024-09-11 NOTE — ASSESSMENT
[FreeTextEntry1] : Patient is a 58 yo woman with hyperparathyroidism and Rahke's cleft cyst here for follow u  1. Normocalcemic hyperparathyroidism -the patient has normal calcium levels with elevated PTH  -she has no history of fractures or kidney stones -was being treated for osteoporosis by rheumatology but stopped after 1 year on alendronate. Surveillance DXA consistent with T scores in osteopenic ranges; recent bone density from March 2023 reviewed on phone and results recorded above -the patient had cervical neck surgery and has subsequent pain/aches that she communicates. Patient adamant that "no one touches my neck." States her cervical surgery affected her quality of life and she declines any surgical intervention for elevated PTH at this time -indications for surgical management of primary hyperparathyroidism including calcium > 1 mg/dl above ULN, GFR < 60, age, presence of symptoms, osteoporosis and kidney stones -the future potential surgical management was discussed but patient strongly declined surgery especially if it's going to be near her neck region. -repeat D, PTH and calcium levels were reviewed on her phone from [facility] portal. Calcium is normal, D is good and PTH has decreased to 83 from >130 -requires DXA March 2025  2. 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 Will defer to neurology for imaging frequency. She has had stable findings since 2020 with no optic nerve or suprasellar involvement  Patient informed that I am leaving the practice. Information for [address] [place] [address] provided. This note sent to PCP Dr Kc

## 2024-09-11 NOTE — END OF VISIT
[Time Spent: ___ minutes] : I have spent [unfilled] minutes of time on the encounter which excludes teaching and separately reported services. Otezla Counseling: The side effects of Otezla were discussed with the patient, including but not limited to worsening or new depression, weight loss, diarrhea, nausea, upper respiratory tract infection, and headache. Patient instructed to call the office should any adverse effect occur.  The patient verbalized understanding of the proper use and possible adverse effects of Otezla.  All the patient's questions and concerns were addressed.

## 2024-09-11 NOTE — CONSULT LETTER
[Dear  ___] : Dear  [unfilled], [Courtesy Letter:] : I had the pleasure of seeing your patient, [unfilled], in my office today. [Please see my note below.] : Please see my note below. [Referral Closing:] : Thank you very much for seeing this patient.  If you have any questions, please do not hesitate to contact me. [Sincerely,] : Sincerely, [FreeTextEntry3] : Marta Bergman MD

## 2024-09-11 NOTE — REVIEW OF SYSTEMS
[Fatigue] : no fatigue [Dysphagia] : no dysphagia [Dysphonia] : no dysphonia [Chest Pain] : no chest pain [Slow Heart Rate] : heart rate is not slow [Palpitations] : no palpitations [Fast Heart Rate] : heart rate is not fast [Shortness Of Breath] : no shortness of breath [Nausea] : no nausea [Vomiting] : no vomiting [As Noted in HPI] : as noted in HPI [Joint Pain] : joint pain

## 2024-09-11 NOTE — HISTORY OF PRESENT ILLNESS
[FreeTextEntry1] : Patient is a 58 yo woman following up for hyperparathyroidism  Patient saw her rheumatologist and had several symptoms. After cervical surgery she complained of some difficulty and muscle aches. Her rheumatologist "checked everything" and PTH was elevated. Osteoporosis was being treated by rheumatology with alendronate. She was on it from [id number] and rheumatology discontinued it as bone were looking improved. No history of fractures. No history of kidney stones Drinks lots of water and has lots of neck pain/aches which she is seeing her rheumatologist for. On endocrine work up, she had normocalcemic hyperparathyroidism PTH checked by PCP Dr Kc July 2022 waws 83 (trending down from previous value of 133) In the interval, she states she needs shoulder surgery July 22, 2024 Vitamin D 56.6 TSH 1.345 Free T4  Calcium was 9.2  Patient with known Rathke's cleft cyst diagnosed in 2020 Surveillance imaging stable pituitary findings, unremarkable September 2023 Under neurologic care for headache Has seen neuro-ophthalmology

## 2024-09-11 NOTE — PHYSICAL EXAM
[Alert] : alert [No Acute Distress] : no acute distress [No Respiratory Distress] : no respiratory distress [No Accessory Muscle Use] : no accessory muscle use [Clear to Auscultation] : lungs were clear to auscultation bilaterally [Normal Rate] : heart rate was normal [Normal Hearing] : hearing was normal [Thyroid Not Enlarged] : the thyroid was not enlarged [Normal Bowel Sounds] : normal bowel sounds [Soft] : abdomen soft [Normal Gait] : normal gait [Normal Affect] : the affect was normal [Normal Mood] : the mood was normal

## 2024-09-30 ENCOUNTER — APPOINTMENT (OUTPATIENT)
Dept: ORTHOPEDIC SURGERY | Facility: CLINIC | Age: 59
End: 2024-09-30
Payer: OTHER MISCELLANEOUS

## 2024-09-30 VITALS — BODY MASS INDEX: 22.15 KG/M2 | HEIGHT: 63 IN | WEIGHT: 125 LBS

## 2024-09-30 DIAGNOSIS — S46.012D STRAIN OF MUSCLE(S) AND TENDON(S) OF THE ROTATOR CUFF OF LEFT SHOULDER, SUBSEQUENT ENCOUNTER: ICD-10-CM

## 2024-09-30 PROCEDURE — 99213 OFFICE O/P EST LOW 20 MIN: CPT

## 2024-09-30 NOTE — PHYSICAL EXAM
[de-identified] : PHYSICAL EXAM LEFT  SHOULDER   NORMAL POSTURE / SCAPULAR PROTRACTION AROM 100 / 100 / 60 / 0   TENDER: SA REGION AND   GH JOINT  SPECIAL TESTING : ZUNIGA - POSITIVE  RIVAS - POSITIVE  SPEED TEST - POSITIVE  RADER - NEGATIVE  APPREHENSION AND SUPPRESSION - NEGATIVE   RC STRENGTH TESTING  SS:  5/5 SUB 5/5 IS     5/5 BICEPS  5/5  SENSATION  - GROSSLY INTACT   [de-identified] : Date of Exam: 09-   EXAM:  MRI LEFT SHOULDER WITHOUT CONTRAST   IMPRESSION:  MRI of the left shoulder demonstrates:   1.  High-grade articular sided and bursal sided tear of the proximal central supraspinatus tendon just distal to the footprint measuring approximately 1.5 cm in the anterior-posterior dimension and up to 1.7 cm in the medial-lateral dimension 2.  Superior labral tear extending into the biceps tendon anchor. 3.  Mild glenohumeral and acromioclavicular osteoarthritis. 4.  Small glenohumeral joint effusion. 5.  Lateral downsloping of the acromion.

## 2024-09-30 NOTE — DISCUSSION/SUMMARY
[de-identified] : PREOP SHOULDER SURGERY DISCUSSION:  PROCEDURE DISCUSSED - QUESTIONS ANSWERED PATIENT WISHES TO PROCEED  POST OP CARE AND LIMITATIONS REVIEWED - HANDOUT PROVIDED   COLD PACKS RECOMMENDED ANALGESICS AND  ANTI NAUSEA MEDS PRESCRIBED  COLACE RECOMMENDED   THERE ARE NO GUARANTEES THAT ALL SYMPTOMS WILL BE ALLEVIATED   SHOULDER ARTHROSCOPY, ACROMIOPLASTY, DEBRIDEMENT,  RC REPAIR AND LABRUM REPAIRS- ON AVERAGE 75- 85% SATISFACTORY RESULTS FOR TEARS < 3CM AFTER 9-12 MONTHS HEALING AND REHABILITATION.   REPAIRS WILL REQUIRE STRICT SHOULDER IMMOBILIZER 4-6 WEEKS  RC TEARS 3CM OR LARGER MAY REQUIRE COLLAGEN PATCH AUGMENTATION GENERALLY HAVE LESS SATISFACTORY RESULTS  PHYSICAL THERAPY REQUIRED 2X WEEK FOR  MINIMUM 8-12 WEEKS FOR ALL PROCEDURES  CONTINUED HOME EXERCISES 6-9 MONTHS AFTER THAT REQUIRED FOR OPTIMAL OUTCOMES   ROUTINE SURGICAL AND ANESTHETIC RISKS INCLUDE RISK OF SURGICAL INFECTION, ANESTHETIC COMPLICATION OR ALLERGY, POSSIBLE RETEARS OR PROGRESSION OF TEAR, STIFFNESS OF SHOULDER AND UNSATISFACTORY OUTCOMES  PATIENT UNDERSTANDS AND WISHES TO PROCEED

## 2024-09-30 NOTE — HISTORY OF PRESENT ILLNESS
[FreeTextEntry1] : WORKERS COMPENSATION  LOCATION:LEFT SHOULDER INJURY -RHD  DATE OF INJURY: SEPTEMBER 26, 2021- WAS LIFTING A PATIENT UP AT WORK AND HEARD A CRACKING NOISE PAIN 8/10 RADIATING PAIN UP NECK AND DOWN  MRI RESULTS TODAY     CONSTANT PAIN 9/10  -  INTERMITTENT 9/10 WITH USE  PAIN LEVEL:9/10  TREATMENTS: PT HAD MORE THAN 5 INJECTIONS IN THE LEFT SHOULDER- LAST INJ WAS JANUARY 2024 (FROM PAIN MANAGEMENT DOCTOR) AGGRAVATING FACTORS: OVER HEAD LIFTING, REACHING BEHIND, AT NIGHT SLEEPING  ASSOCIATED SYMPTOMS: NUMBNESS IN LEFT FINGERS  ASSOCIATED CLICKING/LOCKING/POPPING/GRINDING PATIENT HAD LEFT SHURGERY IN THE PAST 11/21/2022 WORKERS COMP APPROVED SURGERY FOR SHOULDER REPLACEMENT PRIOR STUDIES: MRI FROM [facility] 10/04/2023  PREVIOUS ARTHROSCOPY  3/25/2022 - LEFT SHOULDER ARTHROSCOPY DEBRIDMENT AND CHONDROPLASTY  GH JOINT, BRITANY + DISTAL CLAVICLE RESECTION , BICEPS TENOTOMY, TED AND PRP INJECTION  - NO RELIEF

## 2024-10-03 ENCOUNTER — NON-APPOINTMENT (OUTPATIENT)
Age: 59
End: 2024-10-03

## 2024-10-03 ENCOUNTER — APPOINTMENT (OUTPATIENT)
Dept: OPHTHALMOLOGY | Facility: CLINIC | Age: 59
End: 2024-10-03
Payer: MEDICARE

## 2024-10-03 PROCEDURE — 92083 EXTENDED VISUAL FIELD XM: CPT

## 2024-10-03 PROCEDURE — 92133 CPTRZD OPH DX IMG PST SGM ON: CPT

## 2024-10-03 PROCEDURE — 92014 COMPRE OPH EXAM EST PT 1/>: CPT | Mod: 25

## 2024-10-16 ENCOUNTER — APPOINTMENT (OUTPATIENT)
Dept: PULMONOLOGY | Facility: CLINIC | Age: 59
End: 2024-10-16

## 2024-10-18 ENCOUNTER — APPOINTMENT (OUTPATIENT)
Dept: ORTHOPEDIC SURGERY | Facility: CLINIC | Age: 59
End: 2024-10-18
Payer: OTHER MISCELLANEOUS

## 2024-10-18 DIAGNOSIS — S46.012D STRAIN OF MUSCLE(S) AND TENDON(S) OF THE ROTATOR CUFF OF LEFT SHOULDER, SUBSEQUENT ENCOUNTER: ICD-10-CM

## 2024-10-18 PROCEDURE — 99213 OFFICE O/P EST LOW 20 MIN: CPT | Mod: 25

## 2024-10-18 PROCEDURE — 20611 DRAIN/INJ JOINT/BURSA W/US: CPT | Mod: LT

## 2024-11-05 ENCOUNTER — APPOINTMENT (OUTPATIENT)
Age: 59
End: 2024-11-05

## 2024-11-08 ENCOUNTER — APPOINTMENT (OUTPATIENT)
Dept: ORTHOPEDIC SURGERY | Facility: CLINIC | Age: 59
End: 2024-11-08

## 2024-12-05 NOTE — DISCHARGE NOTE PROVIDER - NSDCHHENCOUNTER_GEN_ALL_CORE
Patient ID: Shine Saravia is a 9 y.o. male who presents for Diarrhea, Cough, and Nasal Congestion.  Today  is accompanied by mother.       HPI  Onset of symptoms:   5 days  Abdominal pains/cramping and diarrhea, many episodes of stooling.  Starting to lessen in frequency 5 yesterday and today  3 mixed appearance BM's  Vomiting initial 12 hours  than resolved   Congestion/runny nose and loose cough yesterday  Drinking fluids, and trying to advance diet  No medication given    Pertinent Negatives:     Fever, cough      Review of Systems   ROS negative except what is noted in HPI      Exam:  /64   Pulse 71   Temp (!) 35.6 °C (96.1 °F)   Wt 47.1 kg   General: Vital signs reviewed, alert, no acute distress  Skin: warm, no rashes, no ecchymosis   Eyes:   Conjunctiva without erythema, no  discharge. PERRL, EOMI  Ears: Right TM: normal color and  landmarks   Left TM: normal color and  landmarks   Nose:   yes congestion   clear, no discharge  Throat: no lesion, tonsils  + 1  without erythema  Neck: Supple, no swollen nodes  Lungs: clear to auscultation  CV: RR, no murmur  Abdomen: soft, +BS,  mild diffuse discomfort to palpation,  no mass, no guarding      Diagnoses and all orders for this visit:  Gastroenteritis    May try OTC Imodium or Pepto Bismol for cramps/diarrhea relief    Advance diet as tolerated, but avoid excess dairy/greasy/spicy until stools normalize     Follow up if new or worsening symptoms, or if illness fails to subside by 4  days   
07-Oct-2021

## 2025-01-02 NOTE — ED ADULT TRIAGE NOTE - NS ED TRIAGE EKG
The patient has been examined and the H&P has been reviewed:    I concur with the findings and no changes have occurred since H&P was written.    Procedure risks, benefits and alternative options discussed and understood by patient/family      There are no hospital problems to display for this patient.     EKG completed

## 2025-01-06 ENCOUNTER — APPOINTMENT (OUTPATIENT)
Dept: ORTHOPEDIC SURGERY | Facility: CLINIC | Age: 60
End: 2025-01-06
Payer: OTHER MISCELLANEOUS

## 2025-01-06 DIAGNOSIS — S46.012D STRAIN OF MUSCLE(S) AND TENDON(S) OF THE ROTATOR CUFF OF LEFT SHOULDER, SUBSEQUENT ENCOUNTER: ICD-10-CM

## 2025-01-06 PROCEDURE — 99213 OFFICE O/P EST LOW 20 MIN: CPT

## 2025-01-08 ENCOUNTER — APPOINTMENT (OUTPATIENT)
Dept: NEPHROLOGY | Facility: CLINIC | Age: 60
End: 2025-01-08
Payer: MEDICARE

## 2025-01-08 VITALS
BODY MASS INDEX: 22.86 KG/M2 | SYSTOLIC BLOOD PRESSURE: 121 MMHG | DIASTOLIC BLOOD PRESSURE: 78 MMHG | WEIGHT: 129 LBS | HEIGHT: 63 IN

## 2025-01-08 DIAGNOSIS — R10.9 UNSPECIFIED ABDOMINAL PAIN: ICD-10-CM

## 2025-01-08 DIAGNOSIS — M32.9 SYSTEMIC LUPUS ERYTHEMATOSUS, UNSPECIFIED: ICD-10-CM

## 2025-01-08 DIAGNOSIS — R80.9 PROTEINURIA, UNSPECIFIED: ICD-10-CM

## 2025-01-08 PROCEDURE — G2211 COMPLEX E/M VISIT ADD ON: CPT

## 2025-01-08 PROCEDURE — 99204 OFFICE O/P NEW MOD 45 MIN: CPT

## 2025-01-09 ENCOUNTER — NON-APPOINTMENT (OUTPATIENT)
Age: 60
End: 2025-01-09

## 2025-01-23 ENCOUNTER — APPOINTMENT (OUTPATIENT)
Dept: ORTHOPEDIC SURGERY | Facility: CLINIC | Age: 60
End: 2025-01-23
Payer: OTHER MISCELLANEOUS

## 2025-01-23 DIAGNOSIS — M54.12 RADICULOPATHY, CERVICAL REGION: ICD-10-CM

## 2025-01-23 PROCEDURE — 99214 OFFICE O/P EST MOD 30 MIN: CPT

## 2025-01-23 PROCEDURE — 72050 X-RAY EXAM NECK SPINE 4/5VWS: CPT

## 2025-01-24 ENCOUNTER — APPOINTMENT (OUTPATIENT)
Dept: INTERNAL MEDICINE | Facility: CLINIC | Age: 60
End: 2025-01-24
Payer: MEDICARE

## 2025-01-24 ENCOUNTER — NON-APPOINTMENT (OUTPATIENT)
Age: 60
End: 2025-01-24

## 2025-01-24 VITALS
BODY MASS INDEX: 22.86 KG/M2 | HEIGHT: 63 IN | WEIGHT: 129 LBS | OXYGEN SATURATION: 95 % | DIASTOLIC BLOOD PRESSURE: 81 MMHG | SYSTOLIC BLOOD PRESSURE: 124 MMHG | TEMPERATURE: 97.2 F | RESPIRATION RATE: 14 BRPM | HEART RATE: 75 BPM

## 2025-01-24 DIAGNOSIS — F32.A ANXIETY DISORDER, UNSPECIFIED: ICD-10-CM

## 2025-01-24 DIAGNOSIS — J98.4 OTHER DISORDERS OF LUNG: ICD-10-CM

## 2025-01-24 DIAGNOSIS — M32.9 SYSTEMIC LUPUS ERYTHEMATOSUS, UNSPECIFIED: ICD-10-CM

## 2025-01-24 DIAGNOSIS — D64.9 ANEMIA, UNSPECIFIED: ICD-10-CM

## 2025-01-24 DIAGNOSIS — G47.33 OBSTRUCTIVE SLEEP APNEA (ADULT) (PEDIATRIC): ICD-10-CM

## 2025-01-24 DIAGNOSIS — F41.9 ANXIETY DISORDER, UNSPECIFIED: ICD-10-CM

## 2025-01-24 PROCEDURE — 93000 ELECTROCARDIOGRAM COMPLETE: CPT

## 2025-01-24 PROCEDURE — 99204 OFFICE O/P NEW MOD 45 MIN: CPT | Mod: 25

## 2025-01-24 RX ORDER — MAGNESIUM OXIDE/MAG AA CHELATE 300 MG
CAPSULE ORAL
Refills: 0 | Status: ACTIVE | COMMUNITY

## 2025-01-25 LAB
ALBUMIN SERPL ELPH-MCNC: 4.5 G/DL
ALP BLD-CCNC: 81 U/L
ALT SERPL-CCNC: 8 U/L
ANION GAP SERPL CALC-SCNC: 11 MMOL/L
APPEARANCE: CLEAR
APTT BLD: 35.5 SEC
AST SERPL-CCNC: 15 U/L
BASOPHILS # BLD AUTO: 0.02 K/UL
BASOPHILS NFR BLD AUTO: 0.4 %
BILIRUB SERPL-MCNC: 0.3 MG/DL
BILIRUBIN URINE: NEGATIVE
BLOOD URINE: NEGATIVE
BUN SERPL-MCNC: 15 MG/DL
CALCIUM SERPL-MCNC: 9.6 MG/DL
CHLORIDE SERPL-SCNC: 104 MMOL/L
CO2 SERPL-SCNC: 26 MMOL/L
COLOR: YELLOW
CREAT SERPL-MCNC: 0.6 MG/DL
EGFR: 103 ML/MIN/1.73M2
EOSINOPHIL # BLD AUTO: 0.05 K/UL
EOSINOPHIL NFR BLD AUTO: 1 %
GLUCOSE QUALITATIVE U: NEGATIVE MG/DL
GLUCOSE SERPL-MCNC: 88 MG/DL
HCT VFR BLD CALC: 37.4 %
HGB BLD-MCNC: 11.5 G/DL
IMM GRANULOCYTES NFR BLD AUTO: 0.2 %
INR PPP: 0.96 RATIO
KETONES URINE: NEGATIVE MG/DL
LEUKOCYTE ESTERASE URINE: NEGATIVE
LYMPHOCYTES # BLD AUTO: 1.68 K/UL
LYMPHOCYTES NFR BLD AUTO: 33.5 %
MAN DIFF?: NORMAL
MCHC RBC-ENTMCNC: 30.7 G/DL
MCHC RBC-ENTMCNC: 30.8 PG
MCV RBC AUTO: 100.3 FL
MONOCYTES # BLD AUTO: 0.49 K/UL
MONOCYTES NFR BLD AUTO: 9.8 %
NEUTROPHILS # BLD AUTO: 2.76 K/UL
NEUTROPHILS NFR BLD AUTO: 55.1 %
NITRITE URINE: NEGATIVE
PH URINE: 5.5
PLATELET # BLD AUTO: 254 K/UL
POTASSIUM SERPL-SCNC: 4.4 MMOL/L
PROT SERPL-MCNC: 7.4 G/DL
PROTEIN URINE: NEGATIVE MG/DL
PT BLD: 11.4 SEC
RBC # BLD: 3.73 M/UL
RBC # FLD: 12.6 %
SODIUM SERPL-SCNC: 142 MMOL/L
SPECIFIC GRAVITY URINE: 1.02
UROBILINOGEN URINE: 0.2 MG/DL
WBC # FLD AUTO: 5.01 K/UL

## 2025-01-27 ENCOUNTER — APPOINTMENT (OUTPATIENT)
Dept: ORTHOPEDIC SURGERY | Facility: CLINIC | Age: 60
End: 2025-01-27
Payer: OTHER MISCELLANEOUS

## 2025-01-27 DIAGNOSIS — M75.102 UNSPECIFIED ROTATOR CUFF TEAR OR RUPTURE OF LEFT SHOULDER, NOT SPECIFIED AS TRAUMATIC: ICD-10-CM

## 2025-01-27 PROCEDURE — 99213 OFFICE O/P EST LOW 20 MIN: CPT

## 2025-01-27 RX ORDER — ONDANSETRON 8 MG/1
8 TABLET, ORALLY DISINTEGRATING ORAL 3 TIMES DAILY
Qty: 15 | Refills: 4 | Status: ACTIVE | COMMUNITY
Start: 2025-01-27 | End: 1900-01-01

## 2025-01-27 RX ORDER — OXYCODONE AND ACETAMINOPHEN 5; 325 MG/1; MG/1
5-325 TABLET ORAL
Qty: 28 | Refills: 0 | Status: ACTIVE | COMMUNITY
Start: 2025-01-27 | End: 1900-01-01

## 2025-01-28 ENCOUNTER — APPOINTMENT (OUTPATIENT)
Dept: NEUROLOGY | Facility: CLINIC | Age: 60
End: 2025-01-28
Payer: MEDICARE

## 2025-01-28 VITALS
HEIGHT: 63 IN | SYSTOLIC BLOOD PRESSURE: 131 MMHG | BODY MASS INDEX: 22.86 KG/M2 | DIASTOLIC BLOOD PRESSURE: 87 MMHG | WEIGHT: 129 LBS | TEMPERATURE: 97.4 F | HEART RATE: 70 BPM | OXYGEN SATURATION: 100 %

## 2025-01-28 DIAGNOSIS — G43.009 MIGRAINE W/OUT AURA, NOT INTRACTABLE, W/OUT STATUS MIGRAINOSUS: ICD-10-CM

## 2025-01-28 PROCEDURE — 99214 OFFICE O/P EST MOD 30 MIN: CPT

## 2025-01-28 PROCEDURE — G2211 COMPLEX E/M VISIT ADD ON: CPT

## 2025-02-03 ENCOUNTER — APPOINTMENT (OUTPATIENT)
Dept: PULMONOLOGY | Facility: CLINIC | Age: 60
End: 2025-02-03
Payer: MEDICARE

## 2025-02-03 VITALS
OXYGEN SATURATION: 98 % | SYSTOLIC BLOOD PRESSURE: 130 MMHG | HEART RATE: 79 BPM | WEIGHT: 129 LBS | BODY MASS INDEX: 22.86 KG/M2 | DIASTOLIC BLOOD PRESSURE: 80 MMHG | HEIGHT: 63 IN | TEMPERATURE: 98.2 F

## 2025-02-03 DIAGNOSIS — J98.4 OTHER DISORDERS OF LUNG: ICD-10-CM

## 2025-02-03 DIAGNOSIS — G47.33 OBSTRUCTIVE SLEEP APNEA (ADULT) (PEDIATRIC): ICD-10-CM

## 2025-02-03 PROCEDURE — 99214 OFFICE O/P EST MOD 30 MIN: CPT | Mod: 25

## 2025-02-03 PROCEDURE — 94010 BREATHING CAPACITY TEST: CPT

## 2025-02-04 ENCOUNTER — APPOINTMENT (OUTPATIENT)
Age: 60
End: 2025-02-04

## 2025-02-04 PROCEDURE — 29820 SHO ARTHRS SRG PRTL SYNVCT: CPT | Mod: LT,59

## 2025-02-04 PROCEDURE — 29827 SHO ARTHRS SRG RT8TR CUF RPR: CPT | Mod: LT

## 2025-02-04 PROCEDURE — 29826 SHO ARTHRS SRG DECOMPRESSION: CPT | Mod: LT,59

## 2025-02-04 PROCEDURE — 29823 SHO ARTHRS SRG XTNSV DBRDMT: CPT | Mod: LT,59

## 2025-02-07 ENCOUNTER — APPOINTMENT (OUTPATIENT)
Dept: ORTHOPEDIC SURGERY | Facility: CLINIC | Age: 60
End: 2025-02-07
Payer: OTHER MISCELLANEOUS

## 2025-02-07 DIAGNOSIS — M75.122 COMPLETE ROTATOR CUFF TEAR OR RUPTURE OF LEFT SHOULDER, NOT SPECIFIED AS TRAUMATIC: ICD-10-CM

## 2025-02-07 DIAGNOSIS — M19.012 PRIMARY OSTEOARTHRITIS, LEFT SHOULDER: ICD-10-CM

## 2025-02-07 PROCEDURE — 99024 POSTOP FOLLOW-UP VISIT: CPT

## 2025-02-07 PROCEDURE — 73030 X-RAY EXAM OF SHOULDER: CPT | Mod: LT

## 2025-03-07 ENCOUNTER — APPOINTMENT (OUTPATIENT)
Dept: ORTHOPEDIC SURGERY | Facility: CLINIC | Age: 60
End: 2025-03-07
Payer: OTHER MISCELLANEOUS

## 2025-03-07 DIAGNOSIS — M75.122 COMPLETE ROTATOR CUFF TEAR OR RUPTURE OF LEFT SHOULDER, NOT SPECIFIED AS TRAUMATIC: ICD-10-CM

## 2025-03-07 PROCEDURE — 99024 POSTOP FOLLOW-UP VISIT: CPT

## 2025-03-14 ENCOUNTER — APPOINTMENT (OUTPATIENT)
Dept: ENDOCRINOLOGY | Facility: CLINIC | Age: 60
End: 2025-03-14
Payer: MEDICARE

## 2025-03-14 VITALS
HEART RATE: 84 BPM | SYSTOLIC BLOOD PRESSURE: 90 MMHG | BODY MASS INDEX: 22.85 KG/M2 | DIASTOLIC BLOOD PRESSURE: 52 MMHG | WEIGHT: 129 LBS

## 2025-03-14 DIAGNOSIS — E21.3 HYPERPARATHYROIDISM, UNSPECIFIED: ICD-10-CM

## 2025-03-14 DIAGNOSIS — M85.852 OTHER SPECIFIED DISORDERS OF BONE DENSITY AND STRUCTURE, LEFT THIGH: ICD-10-CM

## 2025-03-14 PROCEDURE — 99215 OFFICE O/P EST HI 40 MIN: CPT

## 2025-03-14 PROCEDURE — G2211 COMPLEX E/M VISIT ADD ON: CPT

## 2025-03-14 RX ORDER — PREDNISONE 5 MG/1
5 TABLET ORAL
Refills: 0 | Status: ACTIVE | COMMUNITY

## 2025-03-14 RX ORDER — LEFLUNOMIDE 20 MG/1
TABLET, FILM COATED ORAL
Refills: 0 | Status: ACTIVE | COMMUNITY

## 2025-03-14 RX ORDER — PNV NO.95/FERROUS FUM/FOLIC AC 28MG-0.8MG
TABLET ORAL
Refills: 0 | Status: ACTIVE | COMMUNITY

## 2025-03-16 LAB
ALBUMIN SERPL ELPH-MCNC: 4.6 G/DL
ALP BLD-CCNC: 82 U/L
ALT SERPL-CCNC: 10 U/L
ANION GAP SERPL CALC-SCNC: 12 MMOL/L
AST SERPL-CCNC: 18 U/L
BILIRUB SERPL-MCNC: 0.3 MG/DL
BUN SERPL-MCNC: 12 MG/DL
CALCIUM SERPL-MCNC: 9.8 MG/DL
CALCIUM SERPL-MCNC: 9.8 MG/DL
CHLORIDE SERPL-SCNC: 106 MMOL/L
CO2 SERPL-SCNC: 27 MMOL/L
CREAT SERPL-MCNC: 0.61 MG/DL
EGFRCR SERPLBLD CKD-EPI 2021: 102 ML/MIN/1.73M2
GLUCOSE SERPL-MCNC: 91 MG/DL
MAGNESIUM SERPL-MCNC: 2 MG/DL
PARATHYROID HORMONE INTACT: 39 PG/ML
PHOSPHATE SERPL-MCNC: 3.3 MG/DL
POTASSIUM SERPL-SCNC: 4.9 MMOL/L
PROT SERPL-MCNC: 7.6 G/DL
SODIUM SERPL-SCNC: 144 MMOL/L

## 2025-03-18 LAB
ALP BONE SERPL-MCNC: 13.7 UG/L
TTG IGA SER IA-ACNC: <0.5 U/ML
TTG IGA SER-ACNC: NEGATIVE
TTG IGG SER IA-ACNC: <0.8 U/ML
TTG IGG SER IA-ACNC: NEGATIVE

## 2025-03-31 ENCOUNTER — APPOINTMENT (OUTPATIENT)
Dept: ORTHOPEDIC SURGERY | Facility: CLINIC | Age: 60
End: 2025-03-31

## 2025-03-31 DIAGNOSIS — M25.512 PAIN IN LEFT SHOULDER: ICD-10-CM

## 2025-03-31 DIAGNOSIS — M75.122 COMPLETE ROTATOR CUFF TEAR OR RUPTURE OF LEFT SHOULDER, NOT SPECIFIED AS TRAUMATIC: ICD-10-CM

## 2025-03-31 DIAGNOSIS — M54.2 CERVICALGIA: ICD-10-CM

## 2025-03-31 PROCEDURE — 20553 NJX 1/MLT TRIGGER POINTS 3/>: CPT | Mod: 78

## 2025-03-31 PROCEDURE — 99213 OFFICE O/P EST LOW 20 MIN: CPT | Mod: 25

## 2025-03-31 RX ORDER — GABAPENTIN 100 MG/1
100 CAPSULE ORAL
Qty: 90 | Refills: 2 | Status: ACTIVE | COMMUNITY
Start: 2025-03-31 | End: 1900-01-01

## 2025-04-01 ENCOUNTER — APPOINTMENT (OUTPATIENT)
Dept: ORTHOPEDIC SURGERY | Facility: CLINIC | Age: 60
End: 2025-04-01
Payer: OTHER MISCELLANEOUS

## 2025-04-01 DIAGNOSIS — M54.12 RADICULOPATHY, CERVICAL REGION: ICD-10-CM

## 2025-04-01 DIAGNOSIS — M47.12 OTHER SPONDYLOSIS WITH MYELOPATHY, CERVICAL REGION: ICD-10-CM

## 2025-04-01 PROCEDURE — 72050 X-RAY EXAM NECK SPINE 4/5VWS: CPT

## 2025-04-01 PROCEDURE — 99214 OFFICE O/P EST MOD 30 MIN: CPT | Mod: 24

## 2025-04-02 ENCOUNTER — APPOINTMENT (OUTPATIENT)
Dept: PULMONOLOGY | Facility: CLINIC | Age: 60
End: 2025-04-02
Payer: MEDICARE

## 2025-04-02 VITALS
BODY MASS INDEX: 22.86 KG/M2 | OXYGEN SATURATION: 98 % | HEART RATE: 78 BPM | DIASTOLIC BLOOD PRESSURE: 78 MMHG | TEMPERATURE: 98 F | WEIGHT: 129 LBS | SYSTOLIC BLOOD PRESSURE: 112 MMHG | HEIGHT: 63 IN

## 2025-04-02 DIAGNOSIS — J98.4 OTHER DISORDERS OF LUNG: ICD-10-CM

## 2025-04-02 DIAGNOSIS — G47.33 OBSTRUCTIVE SLEEP APNEA (ADULT) (PEDIATRIC): ICD-10-CM

## 2025-04-02 PROCEDURE — 99214 OFFICE O/P EST MOD 30 MIN: CPT | Mod: 25

## 2025-04-02 PROCEDURE — 94010 BREATHING CAPACITY TEST: CPT

## 2025-04-03 ENCOUNTER — OUTPATIENT (OUTPATIENT)
Dept: OUTPATIENT SERVICES | Facility: HOSPITAL | Age: 60
LOS: 1 days | End: 2025-04-03
Payer: MEDICARE

## 2025-04-03 ENCOUNTER — APPOINTMENT (OUTPATIENT)
Dept: INFUSION THERAPY | Facility: CLINIC | Age: 60
End: 2025-04-03

## 2025-04-03 VITALS
WEIGHT: 130.07 LBS | HEIGHT: 61 IN | DIASTOLIC BLOOD PRESSURE: 78 MMHG | TEMPERATURE: 98 F | RESPIRATION RATE: 18 BRPM | SYSTOLIC BLOOD PRESSURE: 112 MMHG | OXYGEN SATURATION: 99 % | HEART RATE: 66 BPM

## 2025-04-03 DIAGNOSIS — M81.0 AGE-RELATED OSTEOPOROSIS WITHOUT CURRENT PATHOLOGICAL FRACTURE: ICD-10-CM

## 2025-04-03 PROCEDURE — 96365 THER/PROPH/DIAG IV INF INIT: CPT

## 2025-04-03 RX ORDER — ZOLEDRONIC ACID 0.05 MG/ML
5 INJECTION, SOLUTION INTRAVENOUS ONCE
Refills: 0 | Status: COMPLETED | OUTPATIENT
Start: 2025-04-03 | End: 2025-04-03

## 2025-04-03 RX ADMIN — ZOLEDRONIC ACID 200 MILLIGRAM(S): 0.05 INJECTION, SOLUTION INTRAVENOUS at 15:01

## 2025-04-03 RX ADMIN — ZOLEDRONIC ACID 5 MILLIGRAM(S): 0.05 INJECTION, SOLUTION INTRAVENOUS at 16:00

## 2025-04-16 ENCOUNTER — APPOINTMENT (OUTPATIENT)
Dept: ORTHOPEDIC SURGERY | Facility: CLINIC | Age: 60
End: 2025-04-16
Payer: OTHER MISCELLANEOUS

## 2025-04-16 DIAGNOSIS — M75.122 COMPLETE ROTATOR CUFF TEAR OR RUPTURE OF LEFT SHOULDER, NOT SPECIFIED AS TRAUMATIC: ICD-10-CM

## 2025-04-16 DIAGNOSIS — M70.22 OLECRANON BURSITIS, LEFT ELBOW: ICD-10-CM

## 2025-04-16 PROCEDURE — 99214 OFFICE O/P EST MOD 30 MIN: CPT | Mod: 25

## 2025-04-16 PROCEDURE — 20606 DRAIN/INJ JOINT/BURSA W/US: CPT

## 2025-04-16 PROCEDURE — 20552 NJX 1/MLT TRIGGER POINT 1/2: CPT | Mod: 59,78

## 2025-05-21 ENCOUNTER — APPOINTMENT (OUTPATIENT)
Dept: ORTHOPEDIC SURGERY | Facility: CLINIC | Age: 60
End: 2025-05-21
Payer: OTHER MISCELLANEOUS

## 2025-05-21 DIAGNOSIS — M25.512 PAIN IN LEFT SHOULDER: ICD-10-CM

## 2025-05-21 DIAGNOSIS — M54.2 CERVICALGIA: ICD-10-CM

## 2025-05-21 DIAGNOSIS — S46.012D STRAIN OF MUSCLE(S) AND TENDON(S) OF THE ROTATOR CUFF OF LEFT SHOULDER, SUBSEQUENT ENCOUNTER: ICD-10-CM

## 2025-05-21 PROCEDURE — 20553 NJX 1/MLT TRIGGER POINTS 3/>: CPT

## 2025-05-21 PROCEDURE — 73030 X-RAY EXAM OF SHOULDER: CPT | Mod: LT

## 2025-05-21 PROCEDURE — 99214 OFFICE O/P EST MOD 30 MIN: CPT | Mod: 25

## 2025-06-04 ENCOUNTER — RX RENEWAL (OUTPATIENT)
Age: 60
End: 2025-06-04

## 2025-06-04 ENCOUNTER — APPOINTMENT (OUTPATIENT)
Dept: ORTHOPEDIC SURGERY | Facility: CLINIC | Age: 60
End: 2025-06-04
Payer: OTHER MISCELLANEOUS

## 2025-06-04 DIAGNOSIS — M54.2 CERVICALGIA: ICD-10-CM

## 2025-06-04 DIAGNOSIS — M75.122 COMPLETE ROTATOR CUFF TEAR OR RUPTURE OF LEFT SHOULDER, NOT SPECIFIED AS TRAUMATIC: ICD-10-CM

## 2025-06-04 DIAGNOSIS — M25.512 PAIN IN LEFT SHOULDER: ICD-10-CM

## 2025-06-04 PROCEDURE — 99213 OFFICE O/P EST LOW 20 MIN: CPT

## 2025-06-06 NOTE — PATIENT PROFILE ADULT - FUNCTIONAL SCREEN CURRENT LEVEL: SWALLOWING (IF SCORE 2 OR MORE FOR ANY ITEM, CONSULT REHAB SERVICES), MLM)
"    [facility] Adult Mental Health Outpatient Programs        PATIENT'S NAME: Archie Wallace  PREFERRED NAME: Archie  PRONOUNS:  he/him  MRN: [id number]  : 1975  ADDRESS: [address] [place] [id number]  ACCT. NUMBER:  [id number]  DATE OF SERVICE: 25  START TIME: 2:30 pm  END TIME: 3:25 pm  Total time: 55 minutes  PREFERRED PHONE: [phone]  May we leave a program related message: Yes  EMERGENCY CONTACT: was obtained [phone] (colleen Moreland).  SERVICE MODALITY:  Video Visit:      Provider verified identity through the following two step process.  Patient provided:  Patient     Telemedicine Visit: The patient's condition can be safely assessed and treated via synchronous audio and visual telemedicine encounter.      Reason for Telemedicine Visit: Patient convenience (e.g. access to timely appointments / distance to available provider)    Originating Site (Patient Location): Patient's place of employment    Distant Site (Provider Location): Provider Remote Setting- Home Office    Consent:  The patient/guardian has verbally consented to: the potential risks and benefits of telemedicine (video visit) versus in person care; bill my insurance or make self-payment for services provided; and responsibility for payment of non-covered services.     Patient would like the video invitation sent by:  My Chart    Mode of Communication:  Video Conference via AmRoomActually    Distant Location (Provider):  Off-site    As the provider I attest to compliance with applicable laws and regulations related to telemedicine.    UNIVERSAL ADULT Mental Health DIAGNOSTIC ASSESSMENT    Identifying Information:  Patient is a 49 year old,  and Black individual.  Patient was referred for an assessment by [facility].   Patient attended the session alone.    Chief Complaint:   The reason for seeking services at this time is: \"weight loss\". He reported he lost " "over 100 lbs, but gained the weight back when he became depressed after his granddaughter passed away. The problem(s) began \"roughly 15 years ago.\"     Patient has attempted to resolve these concerns in the past through changing eating habits. He reported he will lose weight and then gain it back.     Social/Family History:  Patient reported they grew up in [place].  They were raised by biological mother; stepfather; grandmother.  Parents  / .  Patient reported that their childhood was overall good. He stated he grew up with 6 siblings and cousins at home.  Patient described their current relationships with family of origin as \"good.\"     The patient describes their cultural background as other.  Cultural influences and impact on patient's life structure, values, norms, and healthcare: Discrimination.  He reported medical discrimination. He stated doctors have not allowed him pain medication because \"they say I'll abuse them.\" He stated he is waiting to hear back from disability assistance.    Contextual influences on patient's health include: Health- Seeking Factors history of surgeries, chronic pain.    These factors will be addressed in the Preliminary Treatment plan. Patient identified their preferred language to be English. Patient reported they does not need the assistance of an  or other support involved in therapy.     Patient reported had no significant delays in developmental tasks.   Patient's highest education level was some high school but no degree  .  Patient identified the following learning problems: none reported.  Modifications will not be used to assist communication in therapy. Patient reports they are  able to understand written materials.    Patient reported the following relationship history: he was  for 26 years. He was  in 2019. Patient's current relationship status is with fiance for past 6 years.  He reported they are thinking about getting " " next year. Patient identified their sexual orientation as heterosexual.  Patient reported having 4 child(mayo). Patient identified partner; friends; other as part of their support system.  Patient identified the quality of these relationships as good.    Patient's current living/housing situation involves staying with someone.  The immediate members of family and household include mother-in-law, Merly cates, 44,john  and they report that housing is mostly stable. He reported colleen is taking care of the bills right now. He reported he doesn't like to be dependent on someone else.    Patient is currently unemployed.  Patient reports their finances are obtained through family; county assistance; partner. Patient does identify finances as a current stressor.  He reported he was a \"\" previously.    Patient reported that they have not been involved with the legal system. Patient does not report being under probation/ parole/ jurisdiction.    Patient's Strengths and Limitations:  Patient identified the following strengths or resources that will help them succeed in treatment: being handy, easy to please sense of humor and strong social skills. Things that may interfere with the patient's success in treatment include: physical health concerns.     Assessments:  The following assessments were completed by patient for this visit:  PHQ9:       1/16/2025     9:59 AM 1/31/2025     2:04 PM 2/26/2025     1:57 PM 3/21/2025    11:33 AM 5/21/2025     9:01 AM 5/21/2025     9:11 AM 5/30/2025    12:50 PM   PHQ-9 SCORE   PHQ-9 Total Score MyChart 15 (Moderately severe depression)   16 (Moderately severe depression) Incomplete  9 (Mild depression)   PHQ-9 Total Score 15  14 24 16   16 9        Patient-reported     GAD7:       10/10/2016     6:00 PM 9/22/2017    11:40 AM 6/4/2021     7:21 AM 12/29/2023     8:14 AM 2/26/2025     1:55 PM 2/26/2025     1:58 PM 5/21/2025    10:38 AM   MEGAN-7 SCORE   Total Score  11 " (moderate anxiety)   13 (moderate anxiety) Incomplete  8 (mild anxiety)   Total Score 14  11 16 13  18 8        Patient-reported    Proxy-reported    Data saved with a previous flowsheet row definition     CAGE-AID:       5/21/2025    11:13 AM   CAGE-AID Total Score   Total Score 0    Total Score MyChart 0 (A total score of 2 or greater is considered clinically significant)       Patient-reported     PROMIS 10-Global Health (only subscores and total score):       6/17/2021    11:00 AM 8/31/2021     1:00 PM 10/12/2021    10:00 AM 5/5/2022     2:00 PM 1/16/2025    10:19 AM 5/21/2025    11:13 AM 5/30/2025    12:55 PM   PROMIS-10 Scores Only   Global Mental Health Score 13 11 10 5 7  6  8    Global Physical Health Score 8 8 9 6 6  7  6    PROMIS TOTAL - SUBSCORES 21 19 19 11 13  13  14        Patient-reported     Moody Suicide Severity Rating Scale (Lifetime/Recent)      4/29/2020     2:11 PM 5/21/2025    12:05 PM   Moody Suicide Severity Rating (Lifetime/Recent)   Q1 Wish to be Dead (Lifetime) Yes     Comments About 1 year ago after a break up.     Q2 Non-Specific Active Suicidal Thoughts (Lifetime) Yes     Non-Specific Active Suicidal Thought Description (Lifetime) About 1 year ago after a break up. He took some pills.     Most Severe Ideation Rating (Lifetime) 4     Most Severe Ideation Description (Lifetime) About 1 year ago after a break up. He took pills 2 times and they didn't work. He didn't see areason to live.     Frequency (Lifetime) 1     Duration (Lifetime) 2     Controllability (Lifetime) 2     Protective Factors  (Lifetime) 4     Reasons for Ideation (Lifetime) 3     RETIRED: 1. Wish to be Dead (Recent) No     RETIRED: 2. Non-Specific Active Suicidal Thoughts (Recent) No     RETIRED: 3. Active Suicidal Ideation with any Methods (Not Plan) Without Intent to Act (Recent) No     RETIRE: 4. Active Suicidal Ideation with Some Intent to Act, Without Specific Plan (Lifetime) Yes     RETIRE: Active Suicidal  "Ideation with Some Intent to Act, Without Specific Plan Description (Lifetime) About 1 year ago after a break up. He took pills 2 times and they didn't work.     4. Active Suicidal Ideation with Some Intent to Act, Without Specific Plan (Recent) No     RETIRE: 5. Active Suicidal Ideation with Specific Plan and Intent (Lifetime) Yes     RETIRE: Active Suicidal Ideation with Specific Plan and Intent Description (Lifetime) About 1 year ago after a break up. He took pills 2 times and they didn't work.    RETIRED: 5. Active Suicidal Ideation with Specific Plan and Intent (Recent) No     Most Severe Ideation Rating (Past Month) NA     Frequency (Past Month) NA     Duration (Past Month) NA     Controllability (Past Month) NA     Protective Factors (Past Month) NA     Reasons for Ideation (Past Month) 0     Actual Attempt (Lifetime) Yes     Actual Attempt Description (Lifetime) About 1 year ago after a break up. He \"took pills 2 times and they didn't work.\"     Total Number of Actual Attempts (Lifetime) 2     Actual Attempt (Past 3 Months) No     Has subject engaged in non-suicidal self-injurious behavior? (Lifetime) No     Has subject engaged in non-suicidal self-injurious behavior? (Past 3 Months) No     Interrupted Attempts (Lifetime) No     Interrupted Attempts (Past 3 Months) No    Aborted or Self-Interrupted Attempt (Lifetime) No    Aborted or Self-Interrupted Attempt (Past 3 Months) No     Preparatory Acts or Behavior (Lifetime) No     Preparatory Acts or Behavior (Past 3 Months) No     Most Recent Attempt Actual Lethality Code 0     Most Recent Attempt Potential Lethality Code 0     Most Lethal Attempt Actual Lethality Code 0     Most Lethal Attemplt Potential Lethality Code 0     Initial/First Attempt Actual Lethality Code 0     Initial/First Attempt Potential Lethality Code 0     1. Wish to be Dead (Lifetime)  Y   1. Wish to be Dead (Past 1 Month)  N   2. Non-Specific Active Suicidal Thoughts (Lifetime)  Y   2. " 0 = swallows foods/liquids without difficulty "Non-Specific Active Suicidal Thoughts (Past 1 Month)  N   3. Active Suicidal Ideation with any Methods (Not Plan) Without Intent to Act (Lifetime)  Y   3. Active Suicidal Ideation with any Methods (Not Plan) Without Intent to Act (Past 1 Month)  N   4. Active Suicidal Ideation with Some Intent to Act, Without Specific Plan (Lifetime)  Y   4. Active Suicidal Ideation with Some Intent to Act, Without Specific Plan (Past 1 Month)  N   5. Active Suicidal Ideation with Specific Plan and Intent (Lifetime)  Y   5. Active Suicidal Ideation with Specific Plan and Intent (Past 1 Month)  N   Most Severe Ideation Rating (Lifetime)  5   Frequency (Lifetime)  3   Duration (Lifetime)  3   Controllability (Lifetime)  4   Deterrents (Lifetime)  3   Reasons for Ideation (Lifetime)  5   Reasons for Ideation (Past 1 Month)  0   Actual Attempt (Lifetime)  Y   Total Number of Actual Attempts (Lifetime)  2   Actual Attempt Description (Lifetime)  \"One time in high school I tried to overdose on pills because my girlfriend's mom woulnd't let us see each other and I couldn't go on without her.  It didn't work and I just woke up and threw up a lot.  Another time when I was 16 I had a toothache and I couldn't go on with the pain anymore so I tried to shoot myself but the gun wouldn't work.  That's the last time I've thought of killing myself or hurting myself.  I would never do that now.  I think I was just a kid going through a phase.\"   Actual Attempt (Past 3 Months)  N   Has subject engaged in non-suicidal self-injurious behavior? (Lifetime)  N   Calculated C-SSRS Risk Score (Lifetime/Recent)  Moderate Risk       Data saved with a previous flowsheet row definition     [place] Suicide Severity Rating Scale (Short Version)      7/16/2021     6:48 AM   [place] Suicide Severity Rating (Short Version)   Over the past 2 weeks have you felt down, depressed, or hopeless? no   Over the past 2 weeks have you had thoughts of killing yourself? no " "  Have you ever attempted to kill yourself? no       Personal and Family Medical History:  Patient does not report a family history of mental health concerns.  Patient reports family history includes Diabetes in his father, mother, and sister; Glaucoma in his mother and another family member; Hypertension in his mother..     Patient does not report Mental Health Diagnosis or Treatment.      Patient has had a physical exam to rule out medical causes for current symptoms.  Date of last physical exam was within the past year. Client was encouraged to follow up with PCP if symptoms were to develop. The patient has a [facility] Provider, who is named Agustin Parekh.  Patient reports the following current medical concerns: chronic pain, had half of his thyroid removed (which he reported led his metabolism to slow). Patient reports pain concerns including \"my whole left side goes numb,\" radiating pain from neck down arm.  Patient may want help addressing pain concerns. He reported he had neck fusion surgery and thyroid surgery at the same time in 2021. He stated he has had chronic pain since then. He stated Dr. De Leon, neurologist is his pain provider. He reported he doesn't feel anything has helped much with pain. There are significant appetite / nutritional concerns / weight changes.   He reported past instances of binge eating several years ago. He denied any eating issues now. Patient does not report a history of head injury / trauma / cognitive impairment.  He reported he gets about 4 hours of sleep each night. He reported sleep difficulty due to sleep apnea and chronic pain. He reported having narcolepsy.     Patient reports current meds as:   Current Outpatient Medications   Medication Sig Dispense Refill    albuterol (PROAIR HFA) 108 (90 Base) MCG/ACT inhaler Inhale 2 puffs into the lungs every 6 hours as needed for cough. 18 g 1    amLODIPine (NORVASC) 10 MG tablet Take 1 tablet (10 mg) by mouth " daily. 90 tablet 2    aspirin 81 MG EC tablet Take 1 tablet (81 mg) by mouth daily 90 tablet 3    augmented betamethasone dipropionate (DIPROLENE AF) 0.05 % external cream Apply topically 2 times daily as needed (flares of hand rash. maximum 1 month consistent use). 45 g 0    augmented betamethasone dipropionate (DIPROLENE-AF) 0.05 % external ointment Apply topically 2 times daily. Apply to affected areas on feet for 2-3 weeks, then as needed for flares. 45 g 3    cloNIDine (CATAPRES) 0.2 MG tablet Take 1 tablet (0.2 mg) by mouth 2 times daily. 180 tablet 1    gabapentin (NEURONTIN) 300 MG capsule 300mg po at bedtime x 3 days, then twice daily x 3 days, then three times daily ongoing 90 capsule 2    hydrocortisone 2.5 % cream Apply topically 2 times daily. When rash in skin folds is flared only then stop 30 g 1    Insulin Pen Needle (PEN NEEDLES) 32G X 4 MM MISC 1 each daily 100 each 0    ketoconazole (NIZORAL) 2 % external cream Apply to rash twice daily to rash in abdominal fold until rash resolves. Then use daily to prevent flares. 60 g 5    labetalol (NORMODYNE) 200 MG tablet Take 1 tablet (200 mg) by mouth 2 times daily. 60 tablet 1    losartan (COZAAR) 100 MG tablet Take 1 tablet (100 mg) by mouth daily. 90 tablet 2    mometasone-formoterol (DULERA) 100-5 MCG/ACT inhaler Inhale 2 puffs into the lungs 2 times daily. 13 g 2    naproxen (NAPROSYN) 500 MG tablet Take 1 tablet (500 mg) by mouth 2 times daily (with meals). 60 tablet 0    predniSONE (DELTASONE) 20 MG tablet Take 3 tabs by mouth daily x 3 days, then 2 tabs daily x 3 days, then 1 tab daily x 3 days, then 1/2 tab daily x 3 days. 20 tablet 0    spironolactone (ALDACTONE) 25 MG tablet Take 1 tablet (25 mg) by mouth daily. 90 tablet 1    spironolactone (ALDACTONE) 25 MG tablet Take 1 tablet (25 mg) by mouth daily. 30 tablet 2    tirzepatide (MOUNJARO) 2.5 MG/0.5ML SOAJ auto-injector pen Inject 0.5 mLs (2.5 mg) subcutaneously once a week. 2 mL 0     tirzepatide (MOUNJARO) 5 MG/0.5ML SOAJ auto-injector pen Inject 0.5 mLs (5 mg) subcutaneously once a week. 2 mL 3    tirzepatide-Weight Management (ZEPBOUND) 2.5 MG/0.5ML prefilled pen Inject 0.5 mLs (2.5 mg) subcutaneously every 7 days. 2 mL 1    triamcinolone (KENALOG) 0.1 % external cream Apply topically 2 times daily. Apply to affected areas on legs for 2-3 weeks, then as needed for flares. 80 g 3     No current facility-administered medications for this visit.       Medication Adherence:  Patient reports taking.    Patient Allergies:    Allergies   Allergen Reactions    Oseltamivir Anaphylaxis and Rash    Biaxin [Clarithromycin]      Muscle breakdown    Bromaxefed Dm Rf Cough    Clarithromycin Other (See Comments)     Weakness    Robafen Dm Cgh-Chest [Dextromethorphan-Guaifenesin] Difficulty breathing    Guaifenesin Rash    Metformin Rash     Reported breaking out with a skin reaction       Medical History:    Past Medical History:   Diagnosis Date    CAD (coronary artery disease)     Premature artery disease noted    Class 3 severe obesity due to excess calories with serious comorbidity and body mass index (BMI) of 45.0 to 49.9 in adult (H) 02/18/2010    Bariatric surgery consult 9/7/21 Stephania Youssef PA-C Starting BMI 46 and wt 337 lbs     DDD (degenerative disc disease), cervical     History of MI (myocardial infarction) 07/17/2020    Hyperlipidemia LDL goal <40     Hypertension goal BP (blood pressure) < 140/90     Long QT interval syndrome     MEDICAL HISTORY OF -     History of rhabdomyolosis, from strenuous exercise    Mild persistent asthma     Obstructive sleep apnea     Primary osteoarthritis of left knee     Sleep apnea syndrome     cpap    Type 2 diabetes, HbA1c goal < 7% (H)     Vitamin D deficiency          Current Mental Status Exam:   Appearance:  Appropriate    Eye Contact:  Good   Psychomotor:  Normal       Gait / station:  not observed  Attitude / Demeanor: Interested Friendly  Speech       "Rate / Production: Normal/ Responsive      Volume:  Normal  volume      Language:  intact  Mood:   Euthymic  Affect:   Appropriate    Thought Content: Clear   Thought Process: Coherent  Logical       Associations: No loosening of associations  Insight:   Good   Judgment:  Intact   Orientation:  All  Attention/concentration: Good    Substance Use:   Patient did not report a family history of substance use concerns; see medical history section for details.  Patient has not received chemical dependency treatment in the past.  Patient has not ever been to detox.      Patient is not currently receiving any chemical dependency treatment.           Substance History of use Age of first use Date of last use     Pattern and duration of use (include amounts and frequency)   Alcohol never used       REPORTS SUBSTANCE USE: reports using substance 2 times per year and has 3 beer or Jackson at a time.   Patient reports heaviest use is current use.   Cannabis   currently use 14 05/20/25 REPORTS SUBSTANCE USE: reports using substance 2 times per day and has 1 gram (if smoking) or 30-50mg edibles at a time.   Patient reports heaviest use is current use.      Amphetamines   never used     REPORTS SUBSTANCE USE: N/A   Cocaine/crack    never used       REPORTS SUBSTANCE USE: N/A   Hallucinogens never used         REPORTS SUBSTANCE USE: N/A   Inhalants never used         REPORTS SUBSTANCE USE: N/A   Heroin never used         REPORTS SUBSTANCE USE: N/A   Other Opiates used in the past 19 03/25/25 Not currently using. Used for a month and half for chronic pain.    Benzodiazepine   never used     REPORTS SUBSTANCE USE: N/A   Barbiturates never used     REPORTS SUBSTANCE USE: N/A   Over the counter meds used in the past 6 05/20/25 REPORTS SUBSTANCE USE: N/A   Caffeine used in the past 18   REPORTS SUBSTANCE USE: reports using substance 3 times per week and has \"a good swallow or two\" of an energy drink at a time.   Patient reports " "heaviest use was a few years ago on a diet. He reported a \"monster will last me 3 days.\"   Nicotine  never used     REPORTS SUBSTANCE USE: N/A   Other substances not listed above:  Identify:  never used     REPORTS SUBSTANCE USE: N/A     Patient reported the following problems as a result of their substance use: no problems, not applicable.      Substance Use: No symptoms    Based on the CAGE score of 0 and clinical interview there  are not indications of drug or alcohol abuse.    Significant Losses / Trauma / Abuse / Neglect Issues:   Patient did not  serve in the .  There are indications or report of significant loss, trauma, abuse or neglect issues related to: death of infant granddaughter, major medical concerns, racial discrimination.  Patient has not been a victim of exploitation.  Concerns for possible neglect are not present.    Safety Assessment:   Patient denies current or past homicidal ideation and behaviors.  Patient denies current or past suicidal ideation and behaviors.  Patient denies current or past self-injurious behaviors.  Patient denied risk behaviors associated with substance use.  Patient denies any high risk behaviors associated with mental health symptoms.  Patient denied current or past personal safety concerns.    Patient denies past of current/recent assaultive behaviors.    Patient denied a history of sexual assault behaviors.     Patient reports there  are not firearms in the house.    Patient reports the following protective factors: dedication to family or friends; financial stability; sense of personal control or determination    Risk Plan:  See Recommendations for Safety and Risk Management Plan    Review of Symptoms per patient report:   Depression: Lack of interest or pleasure in doing things, Feeling sad, down, or depressed, Change in energy level, Change in sleep, and Difficulties concentrating  Jordyn:  No Symptoms  Psychosis: \"I've seen a ghost before.\" He reported he " "will \"feel spirits\" sometimes or that his ring camera will go off. He stated he will see ghosts once or twice a month.  He denied auditory hallucinations.   Anxiety: Nervousness, Physical complaints, such as headaches, stomachaches, muscle tension, Psychomotor agitation, and Irritability  Panic:  No symptoms  Post Traumatic Stress Disorder:  Experienced traumatic event (past abuse), he reported past traumatic events do not bother him  Eating Disorder: No Symptoms  ADD / ADHD:  No symptoms  Conduct Disorder: No symptoms  Autism Spectrum Disorder: No symptoms  Obsessive Compulsive Disorder: No Symptoms  Personality Disorders:  N/a    Patient reports the following compulsive behaviors and treatment history: None.        Diagnostic Criteria:   Major Depressive Disorder  A) Recurrent episode(s) - symptoms have been present during the same 2-week period and represent a change from previous functioning 5 or more symptoms (required for diagnosis)   - Depressed mood. Note: In children and adolescents, can be irritable mood.     - Diminished interest or pleasure in all, or almost all, activities.    - Decreased sleep.    - Psychomotor activity agitation.    - Fatigue or loss of energy.    - Diminished ability to think or concentrate, or indecisiveness.   B) The symptoms cause clinically significant distress or impairment in social, occupational, or other important areas of functioning  C) The episode is not attributable to the physiological effects of a substance or to another medical condition  D) The occurence of major depressive episode is not better explained by other thought / psychotic disorders  E) There has never been a manic episode or hypomanic episode    Functional Status:  Patient reports the following functional impairments:  health maintenance, management of the household and or completion of tasks, relationship(s), and self-care.     Nonprogrammatic care:  Patient is requesting basic services to address current " mental health concerns.    Clinical Summary:  1. Psychosocial Factors:  Medical complexities, Financial strain.  Cultural and Contextual Factors: past medical discrimination, chronic pain  2. Principal DSM5 Diagnoses  (Sustained by DSM5 Criteria Listed Above):  MDD, recurrent, mild  3. Other Diagnoses that is relevant to services:   n/a.  4. Provisional Diagnosis:   n/a  5. Prognosis: Maintain Current Status / Prevent Deterioration.  6. Likely consequences of symptoms if not treated: no change.  7. Patient strengths include:  caring, empathetic, insightful, motivated, open to suggestions / feedback, and willing to relate to others .     Recommendations:     1. Plan for Safety and Risk Management:   Safety and Risk: Recommended that patient call 911 or go to the local ED should there be a change in any of these risk factors        Report to child / adult protection services was NA.     2. Patient's identified mental health and physical health concerns with a cultural influence will be addressed by using patient-centered care.     3. Initial Treatment will focus on: Psychological testing is required to adequately assess if the patient is stable enough to proceed with the bariatric surgery process.       4. Resources/Service Plan:    services are not indicated.   Modifications to assist communication are not indicated.   Additional disability accommodations are not indicated.      5. Collaboration:   Collaboration / coordination of treatment will be initiated with the following  support professionals: Bariatric/Weight Management Team.      6.  Referrals:   The following referral(s) will be initiated: None.       A Release of Information has been obtained for the following: None.     Clinical Substantiation/medical necessity for the above recommendations:  N/a.    7. SHARRON:    SHARRON:  Discussed the general effects of drugs and alcohol on health and well-being. Recommendations:  abstain from all substances  counterintuitive to bariatric pre- and post- surgery .     8. Records:   These were reviewed at time of assessment.   Information in this assessment was obtained from the medical record and  provided by patient who is a good historian.    Patient will have open access to their mental health medical record.    9.   Interactive Complexity: No    10. Safety Plan:   [facility] Safety Plan      Creation Date: 5/21/25 Last Update Date: 5/21/25      Step 1: Warning signs:    Warning Signs    Thinking about overdosing on pills    Worrying about my pain      Step 2: Internal coping strategies - Things I can do to take my mind off my problems without contacting another person:    Strategies    Deep breathing    Watch a show    Pet my dogs      Step 3: People and social settings that provide distraction:    Name Contact Information    Fiance in cell phone         Step 4: People whom I can ask for help during a crisis:    Name Contact Information    Fiance in cell phone      Step 5: Professionals or agencies I can contact during a crisis:    Clinician/Agency Name Phone Emergency Contact    [facility]        Suicide Prevention Lifeline Phone: Call or Text 182  Crisis Text Line: Text HOME to 641034     Step 6: Making the environment safer (plan for lethal means safety):   Keep my medications in a secure place.  Do not try to obtain access to firearms.     Optional: What is most important to me and worth living for?:   My family     [facility] Safety Plan. Dodie Romero and Tariq Boss. Used with permission of the authors.           David Torres MS  Doctoral Psychology Intern   6/9/2025 at 11:09 AM

## 2025-06-23 ENCOUNTER — APPOINTMENT (OUTPATIENT)
Dept: ORTHOPEDIC SURGERY | Facility: CLINIC | Age: 60
End: 2025-06-23
Payer: OTHER MISCELLANEOUS

## 2025-06-23 PROCEDURE — 20552 NJX 1/MLT TRIGGER POINT 1/2: CPT

## 2025-06-23 PROCEDURE — 99214 OFFICE O/P EST MOD 30 MIN: CPT | Mod: 25

## 2025-06-27 ENCOUNTER — NON-APPOINTMENT (OUTPATIENT)
Age: 60
End: 2025-06-27

## 2025-06-27 ENCOUNTER — APPOINTMENT (OUTPATIENT)
Dept: OPHTHALMOLOGY | Facility: CLINIC | Age: 60
End: 2025-06-27
Payer: MEDICARE

## 2025-06-27 PROCEDURE — 92083 EXTENDED VISUAL FIELD XM: CPT

## 2025-06-27 PROCEDURE — 92014 COMPRE OPH EXAM EST PT 1/>: CPT | Mod: 25

## 2025-06-27 PROCEDURE — 92134 CPTRZ OPH DX IMG PST SGM RTA: CPT

## 2025-07-16 ENCOUNTER — APPOINTMENT (OUTPATIENT)
Dept: ORTHOPEDIC SURGERY | Facility: CLINIC | Age: 60
End: 2025-07-16
Payer: OTHER MISCELLANEOUS

## 2025-07-16 PROCEDURE — 72050 X-RAY EXAM NECK SPINE 4/5VWS: CPT

## 2025-07-16 PROCEDURE — 99214 OFFICE O/P EST MOD 30 MIN: CPT

## 2025-07-16 RX ORDER — TIZANIDINE 2 MG/1
2 TABLET ORAL
Qty: 40 | Refills: 1 | Status: ACTIVE | COMMUNITY
Start: 2025-07-16 | End: 1900-01-01

## 2025-08-14 ENCOUNTER — APPOINTMENT (OUTPATIENT)
Dept: ORTHOPEDIC SURGERY | Facility: CLINIC | Age: 60
End: 2025-08-14

## 2025-08-14 DIAGNOSIS — M67.829: ICD-10-CM

## 2025-08-14 DIAGNOSIS — M75.122 COMPLETE ROTATOR CUFF TEAR OR RUPTURE OF LEFT SHOULDER, NOT SPECIFIED AS TRAUMATIC: ICD-10-CM

## 2025-08-14 DIAGNOSIS — M77.02 MEDIAL EPICONDYLITIS, LEFT ELBOW: ICD-10-CM

## 2025-08-14 PROCEDURE — 20551 NJX 1 TENDON ORIGIN/INSJ: CPT | Mod: LT

## 2025-08-14 PROCEDURE — 99204 OFFICE O/P NEW MOD 45 MIN: CPT | Mod: 25

## 2025-09-12 ENCOUNTER — EMERGENCY (EMERGENCY)
Facility: HOSPITAL | Age: 60
LOS: 1 days | End: 2025-09-12
Attending: STUDENT IN AN ORGANIZED HEALTH CARE EDUCATION/TRAINING PROGRAM | Admitting: STUDENT IN AN ORGANIZED HEALTH CARE EDUCATION/TRAINING PROGRAM
Payer: OTHER MISCELLANEOUS

## 2025-09-12 VITALS
RESPIRATION RATE: 18 BRPM | HEART RATE: 82 BPM | WEIGHT: 119.93 LBS | SYSTOLIC BLOOD PRESSURE: 129 MMHG | HEIGHT: 63 IN | OXYGEN SATURATION: 100 % | TEMPERATURE: 98 F | DIASTOLIC BLOOD PRESSURE: 88 MMHG

## 2025-09-12 VITALS
TEMPERATURE: 98 F | DIASTOLIC BLOOD PRESSURE: 88 MMHG | HEART RATE: 88 BPM | SYSTOLIC BLOOD PRESSURE: 125 MMHG | RESPIRATION RATE: 18 BRPM | OXYGEN SATURATION: 98 %

## 2025-09-12 LAB
ANION GAP SERPL CALC-SCNC: 10 MMOL/L — SIGNIFICANT CHANGE UP (ref 5–17)
BASOPHILS # BLD AUTO: 0.02 K/UL — SIGNIFICANT CHANGE UP (ref 0–0.2)
BASOPHILS NFR BLD AUTO: 0.3 % — SIGNIFICANT CHANGE UP (ref 0–2)
BUN SERPL-MCNC: 13 MG/DL — SIGNIFICANT CHANGE UP (ref 7–23)
CALCIUM SERPL-MCNC: 9.7 MG/DL — SIGNIFICANT CHANGE UP (ref 8.4–10.5)
CHLORIDE SERPL-SCNC: 104 MMOL/L — SIGNIFICANT CHANGE UP (ref 96–108)
CO2 SERPL-SCNC: 28 MMOL/L — SIGNIFICANT CHANGE UP (ref 22–31)
CREAT SERPL-MCNC: 0.62 MG/DL — SIGNIFICANT CHANGE UP (ref 0.5–1.3)
EGFR: 101 ML/MIN/1.73M2 — SIGNIFICANT CHANGE UP
EGFR: 101 ML/MIN/1.73M2 — SIGNIFICANT CHANGE UP
EOSINOPHIL # BLD AUTO: 0.07 K/UL — SIGNIFICANT CHANGE UP (ref 0–0.5)
EOSINOPHIL NFR BLD AUTO: 1.2 % — SIGNIFICANT CHANGE UP (ref 0–6)
GLUCOSE SERPL-MCNC: 100 MG/DL — HIGH (ref 70–99)
HCT VFR BLD CALC: 33.6 % — LOW (ref 34.5–45)
HGB BLD-MCNC: 10.8 G/DL — LOW (ref 11.5–15.5)
IMM GRANULOCYTES # BLD AUTO: 0.03 K/UL — SIGNIFICANT CHANGE UP (ref 0–0.07)
IMM GRANULOCYTES NFR BLD AUTO: 0.5 % — SIGNIFICANT CHANGE UP (ref 0–0.9)
LYMPHOCYTES # BLD AUTO: 2.23 K/UL — SIGNIFICANT CHANGE UP (ref 1–3.3)
LYMPHOCYTES NFR BLD AUTO: 38.3 % — SIGNIFICANT CHANGE UP (ref 13–44)
MCHC RBC-ENTMCNC: 30.4 PG — SIGNIFICANT CHANGE UP (ref 27–34)
MCHC RBC-ENTMCNC: 32.1 G/DL — SIGNIFICANT CHANGE UP (ref 32–36)
MCV RBC AUTO: 94.6 FL — SIGNIFICANT CHANGE UP (ref 80–100)
MONOCYTES # BLD AUTO: 0.54 K/UL — SIGNIFICANT CHANGE UP (ref 0–0.9)
MONOCYTES NFR BLD AUTO: 9.3 % — SIGNIFICANT CHANGE UP (ref 2–14)
NEUTROPHILS # BLD AUTO: 2.93 K/UL — SIGNIFICANT CHANGE UP (ref 1.8–7.4)
NEUTROPHILS NFR BLD AUTO: 50.4 % — SIGNIFICANT CHANGE UP (ref 43–77)
NRBC # BLD AUTO: 0 K/UL — SIGNIFICANT CHANGE UP (ref 0–0)
NRBC # FLD: 0 K/UL — SIGNIFICANT CHANGE UP (ref 0–0)
NRBC BLD AUTO-RTO: 0 /100 WBCS — SIGNIFICANT CHANGE UP (ref 0–0)
PLATELET # BLD AUTO: 265 K/UL — SIGNIFICANT CHANGE UP (ref 150–400)
PMV BLD: 10.4 FL — SIGNIFICANT CHANGE UP (ref 7–13)
POTASSIUM SERPL-MCNC: 4 MMOL/L — SIGNIFICANT CHANGE UP (ref 3.5–5.3)
POTASSIUM SERPL-SCNC: 4 MMOL/L — SIGNIFICANT CHANGE UP (ref 3.5–5.3)
RBC # BLD: 3.55 M/UL — LOW (ref 3.8–5.2)
RBC # FLD: 12.3 % — SIGNIFICANT CHANGE UP (ref 10.3–14.5)
SODIUM SERPL-SCNC: 142 MMOL/L — SIGNIFICANT CHANGE UP (ref 135–145)
WBC # BLD: 5.82 K/UL — SIGNIFICANT CHANGE UP (ref 3.8–10.5)
WBC # FLD AUTO: 5.82 K/UL — SIGNIFICANT CHANGE UP (ref 3.8–10.5)

## 2025-09-12 PROCEDURE — 72141 MRI NECK SPINE W/O DYE: CPT

## 2025-09-12 PROCEDURE — 72141 MRI NECK SPINE W/O DYE: CPT | Mod: 26

## 2025-09-12 PROCEDURE — 96375 TX/PRO/DX INJ NEW DRUG ADDON: CPT

## 2025-09-12 PROCEDURE — 80048 BASIC METABOLIC PNL TOTAL CA: CPT

## 2025-09-12 PROCEDURE — 72052 X-RAY EXAM NECK SPINE 6/>VWS: CPT

## 2025-09-12 PROCEDURE — 96374 THER/PROPH/DIAG INJ IV PUSH: CPT

## 2025-09-12 PROCEDURE — 85025 COMPLETE CBC W/AUTO DIFF WBC: CPT

## 2025-09-12 PROCEDURE — 99285 EMERGENCY DEPT VISIT HI MDM: CPT

## 2025-09-12 PROCEDURE — 99284 EMERGENCY DEPT VISIT MOD MDM: CPT | Mod: 25

## 2025-09-12 PROCEDURE — 36415 COLL VENOUS BLD VENIPUNCTURE: CPT

## 2025-09-12 PROCEDURE — 72052 X-RAY EXAM NECK SPINE 6/>VWS: CPT | Mod: 26

## 2025-09-12 RX ORDER — METHYLPREDNISOLONE ACETATE 80 MG/ML
1 INJECTION, SUSPENSION INTRA-ARTICULAR; INTRALESIONAL; INTRAMUSCULAR; SOFT TISSUE
Qty: 21 | Refills: 0
Start: 2025-09-12 | End: 2025-09-17

## 2025-09-12 RX ORDER — LIDOCAINE HYDROCHLORIDE 20 MG/ML
1 JELLY TOPICAL ONCE
Refills: 0 | Status: COMPLETED | OUTPATIENT
Start: 2025-09-12 | End: 2025-09-12

## 2025-09-12 RX ORDER — ACETAMINOPHEN 500 MG/5ML
1000 LIQUID (ML) ORAL ONCE
Refills: 0 | Status: COMPLETED | OUTPATIENT
Start: 2025-09-12 | End: 2025-09-12

## 2025-09-12 RX ORDER — CYCLOBENZAPRINE HYDROCHLORIDE 15 MG/1
10 CAPSULE, EXTENDED RELEASE ORAL ONCE
Refills: 0 | Status: COMPLETED | OUTPATIENT
Start: 2025-09-12 | End: 2025-09-12

## 2025-09-12 RX ADMIN — CYCLOBENZAPRINE HYDROCHLORIDE 10 MILLIGRAM(S): 15 CAPSULE, EXTENDED RELEASE ORAL at 13:53

## 2025-09-12 RX ADMIN — Medication 4 MILLIGRAM(S): at 14:46

## 2025-09-12 RX ADMIN — LIDOCAINE HYDROCHLORIDE 1 PATCH: 20 JELLY TOPICAL at 13:53

## 2025-09-12 RX ADMIN — Medication 400 MILLIGRAM(S): at 13:54

## 2025-09-15 ENCOUNTER — APPOINTMENT (OUTPATIENT)
Dept: ORTHOPEDIC SURGERY | Facility: CLINIC | Age: 60
End: 2025-09-15
Payer: OTHER MISCELLANEOUS

## 2025-09-15 DIAGNOSIS — M99.71 CONNECTIVE TISSUE AND DISC STENOSIS OF INTERVERTEBRAL FORAMINA OF CERVICAL REGION: ICD-10-CM

## 2025-09-15 DIAGNOSIS — Z88.6 ALLERGY STATUS TO ANALGESIC AGENT: ICD-10-CM

## 2025-09-15 DIAGNOSIS — Z88.0 ALLERGY STATUS TO PENICILLIN: ICD-10-CM

## 2025-09-15 DIAGNOSIS — M54.12 RADICULOPATHY, CERVICAL REGION: ICD-10-CM

## 2025-09-15 DIAGNOSIS — R20.0 ANESTHESIA OF SKIN: ICD-10-CM

## 2025-09-15 DIAGNOSIS — M75.102 UNSPECIFIED ROTATOR CUFF TEAR OR RUPTURE OF LEFT SHOULDER, NOT SPECIFIED AS TRAUMATIC: ICD-10-CM

## 2025-09-15 DIAGNOSIS — Z91.013 ALLERGY TO SEAFOOD: ICD-10-CM

## 2025-09-15 PROCEDURE — 99214 OFFICE O/P EST MOD 30 MIN: CPT

## 2025-09-16 ENCOUNTER — APPOINTMENT (OUTPATIENT)
Dept: NEUROLOGY | Facility: CLINIC | Age: 60
End: 2025-09-16
Payer: MEDICARE

## 2025-09-16 VITALS
TEMPERATURE: 97.9 F | DIASTOLIC BLOOD PRESSURE: 83 MMHG | OXYGEN SATURATION: 99 % | HEIGHT: 63 IN | SYSTOLIC BLOOD PRESSURE: 144 MMHG | HEART RATE: 71 BPM

## 2025-09-16 DIAGNOSIS — G43.009 MIGRAINE W/OUT AURA, NOT INTRACTABLE, W/OUT STATUS MIGRAINOSUS: ICD-10-CM

## 2025-09-16 DIAGNOSIS — M54.2 CERVICALGIA: ICD-10-CM

## 2025-09-16 DIAGNOSIS — M54.12 RADICULOPATHY, CERVICAL REGION: ICD-10-CM

## 2025-09-16 PROCEDURE — 99214 OFFICE O/P EST MOD 30 MIN: CPT

## 2025-09-16 PROCEDURE — G2211 COMPLEX E/M VISIT ADD ON: CPT
